# Patient Record
Sex: MALE | Race: WHITE | NOT HISPANIC OR LATINO | Employment: OTHER | URBAN - METROPOLITAN AREA
[De-identification: names, ages, dates, MRNs, and addresses within clinical notes are randomized per-mention and may not be internally consistent; named-entity substitution may affect disease eponyms.]

---

## 2017-06-15 ENCOUNTER — HOSPITAL ENCOUNTER (OUTPATIENT)
Dept: CARDIOLOGY | Facility: HOSPITAL | Age: 67
Discharge: HOME | End: 2017-06-15
Attending: INTERNAL MEDICINE | Admitting: INTERNAL MEDICINE

## 2017-06-19 ENCOUNTER — HOSPITAL ENCOUNTER (OUTPATIENT)
Dept: OTHER | Facility: HOSPITAL | Age: 67
Discharge: HOME | End: 2017-06-19
Attending: INTERNAL MEDICINE | Admitting: INTERNAL MEDICINE

## 2017-06-19 LAB
ANION GAP SERPL CALC-SCNC: 13.5 MMOL/L (ref 10–20)
BASOPHILS # BLD AUTO: 0.1 10*3/UL (ref 0–0.2)
BASOPHILS NFR BLD AUTO: 1 % (ref 0–2)
BUN SERPL-MCNC: 14 MG/DL (ref 8–20)
BUN/CREAT SERPL: 15.6 (ref 6.2–20.3)
CALCIUM SERPL-MCNC: 9.6 MG/DL (ref 8.9–10.3)
CHLORIDE SERPL-SCNC: 103 MMOL/L (ref 101–111)
CHOLEST SERPL-MCNC: 141 MG/DL
CHOLEST/HDLC SERPL: 3.8 {RATIO}
CONV CO2: 28 MMOL/L (ref 22–32)
CONV LDL CHOLESTEROL DIRECT: 89 MG/DL (ref 0–100)
CREAT UR-MCNC: 0.9 MG/DL (ref 0.7–1.2)
DIFFERENTIAL METHOD BLD: (no result)
EOSINOPHIL # BLD AUTO: 0.1 10*3/UL (ref 0–0.3)
EOSINOPHIL # BLD AUTO: 2 % (ref 0–3)
ERYTHROCYTE [DISTWIDTH] IN BLOOD BY AUTOMATED COUNT: 15.1 % (ref 11.5–14.5)
GLUCOSE SERPL-MCNC: 109 MG/DL (ref 65–99)
HCT VFR BLD AUTO: 43 % (ref 40–54)
HDLC SERPL-MCNC: 37 MG/DL
HGB BLD-MCNC: 14 G/DL (ref 14–18)
INR PPP: 0.9
LDLC/HDLC SERPL: 2.4 {RATIO}
LIPID INTERPRETATION: ABNORMAL
LYMPHOCYTES # BLD AUTO: 1.6 10*3/UL (ref 0.8–4.8)
LYMPHOCYTES NFR BLD AUTO: 20 % (ref 18–42)
MCH RBC QN AUTO: 24.6 PG (ref 26–32)
MCHC RBC AUTO-ENTMCNC: 32.7 G/DL (ref 32–36)
MCV RBC AUTO: 75.4 FL (ref 80–94)
MONOCYTES # BLD AUTO: 0.8 10*3/UL (ref 0.1–1.3)
MONOCYTES NFR BLD AUTO: 11 % (ref 2–11)
NEUTROPHILS # BLD AUTO: 5.1 10*3/UL (ref 2.3–8.6)
NEUTROPHILS NFR BLD AUTO: 66 % (ref 50–75)
NRBC BLD AUTO-RTO: 0 /100{WBCS}
NRBC/RBC NFR BLD MANUAL: 0 10*3/UL
PLATELET # BLD AUTO: 213 10*3/UL (ref 150–450)
PMV BLD AUTO: 10 FL (ref 7.4–10.4)
POTASSIUM SERPL-SCNC: 4.5 MMOL/L (ref 3.6–5.1)
PROTHROMBIN TIME: 10.8 SEC (ref 9.6–11.7)
RBC # BLD AUTO: 5.7 10*6/UL (ref 4.6–6)
SODIUM SERPL-SCNC: 140 MMOL/L (ref 136–144)
TRIGL SERPL-MCNC: 67 MG/DL
VLDLC SERPL CALC-MCNC: 15 MG/DL
WBC # BLD AUTO: 7.6 10*3/UL (ref 4.5–11.5)

## 2018-05-18 ENCOUNTER — HOSPITAL ENCOUNTER (OUTPATIENT)
Dept: LAB | Facility: HOSPITAL | Age: 68
Discharge: HOME | End: 2018-05-18
Attending: INTERNAL MEDICINE | Admitting: INTERNAL MEDICINE

## 2018-05-18 LAB
ALBUMIN SERPL-MCNC: 4.1 G/DL (ref 3.5–4.8)
ALBUMIN/GLOB SERPL: 1.8 {RATIO} (ref 1–1.7)
ALP SERPL-CCNC: 56 IU/L (ref 32–91)
ALT SERPL-CCNC: 19 IU/L (ref 17–63)
ANION GAP SERPL CALC-SCNC: 9.8 MMOL/L (ref 10–20)
AST SERPL-CCNC: 22 IU/L (ref 15–41)
BASOPHILS # BLD AUTO: 0.1 10*3/UL (ref 0–0.2)
BASOPHILS NFR BLD AUTO: 1 % (ref 0–2)
BILIRUB SERPL-MCNC: 1 MG/DL (ref 0.3–1.2)
BUN SERPL-MCNC: 17 MG/DL (ref 8–20)
BUN/CREAT SERPL: 17 (ref 6.2–20.3)
CALCIUM SERPL-MCNC: 10.1 MG/DL (ref 8.9–10.3)
CHLORIDE SERPL-SCNC: 103 MMOL/L (ref 101–111)
CHOLEST SERPL-MCNC: 102 MG/DL
CHOLEST/HDLC SERPL: 3.9 {RATIO}
CONV CO2: 29 MMOL/L (ref 22–32)
CONV LDL CHOLESTEROL DIRECT: 66 MG/DL (ref 0–100)
CONV TOTAL PROTEIN: 6.4 G/DL (ref 6.1–7.9)
CREAT UR-MCNC: 1 MG/DL (ref 0.7–1.2)
DIFFERENTIAL METHOD BLD: (no result)
EOSINOPHIL # BLD AUTO: 0.1 10*3/UL (ref 0–0.3)
EOSINOPHIL # BLD AUTO: 1 % (ref 0–3)
ERYTHROCYTE [DISTWIDTH] IN BLOOD BY AUTOMATED COUNT: 15.6 % (ref 11.5–14.5)
GLOBULIN UR ELPH-MCNC: 2.3 G/DL (ref 2.5–3.8)
GLUCOSE SERPL-MCNC: 111 MG/DL (ref 65–99)
HCT VFR BLD AUTO: 47.2 % (ref 40–54)
HDLC SERPL-MCNC: 26 MG/DL
HGB BLD-MCNC: 15.3 G/DL (ref 14–18)
LDLC/HDLC SERPL: 2.5 {RATIO}
LIPID INTERPRETATION: ABNORMAL
LYMPHOCYTES # BLD AUTO: 1.8 10*3/UL (ref 0.8–4.8)
LYMPHOCYTES NFR BLD AUTO: 22 % (ref 18–42)
MAGNESIUM SERPL-MCNC: 2.1 MG/DL (ref 1.8–2.5)
MCH RBC QN AUTO: 26.5 PG (ref 26–32)
MCHC RBC AUTO-ENTMCNC: 32.5 G/DL (ref 32–36)
MCV RBC AUTO: 81.7 FL (ref 80–94)
MONOCYTES # BLD AUTO: 0.9 10*3/UL (ref 0.1–1.3)
MONOCYTES NFR BLD AUTO: 11 % (ref 2–11)
NEUTROPHILS # BLD AUTO: 5.3 10*3/UL (ref 2.3–8.6)
NEUTROPHILS NFR BLD AUTO: 65 % (ref 50–75)
NRBC BLD AUTO-RTO: 0 /100{WBCS}
NRBC/RBC NFR BLD MANUAL: 0 10*3/UL
PLATELET # BLD AUTO: 207 10*3/UL (ref 150–450)
PMV BLD AUTO: 10.7 FL (ref 7.4–10.4)
POTASSIUM SERPL-SCNC: 4.8 MMOL/L (ref 3.6–5.1)
RBC # BLD AUTO: 5.78 10*6/UL (ref 4.6–6)
SODIUM SERPL-SCNC: 137 MMOL/L (ref 136–144)
TRIGL SERPL-MCNC: 71 MG/DL
VLDLC SERPL CALC-MCNC: 9.5 MG/DL
WBC # BLD AUTO: 8.1 10*3/UL (ref 4.5–11.5)

## 2018-05-19 LAB
PSA FREE SERPL-MCNC: 0.25 NG/ML
PSA SERPL-MCNC: 1 NG/ML

## 2018-08-07 ENCOUNTER — ON CAMPUS - OUTPATIENT (OUTPATIENT)
Dept: URBAN - METROPOLITAN AREA HOSPITAL 85 | Facility: HOSPITAL | Age: 68
End: 2018-08-07
Payer: COMMERCIAL

## 2018-08-07 ENCOUNTER — HOSPITAL ENCOUNTER (OUTPATIENT)
Dept: PREOP | Facility: HOSPITAL | Age: 68
Setting detail: HOSPITAL OUTPATIENT SURGERY
Discharge: HOME | End: 2018-08-07
Attending: INTERNAL MEDICINE | Admitting: INTERNAL MEDICINE

## 2018-08-07 DIAGNOSIS — Z86.010 PERSONAL HISTORY OF COLONIC POLYPS: ICD-10-CM

## 2018-08-07 DIAGNOSIS — K64.8 OTHER HEMORRHOIDS: ICD-10-CM

## 2018-08-07 DIAGNOSIS — K57.30 DIVERTICULOSIS OF LARGE INTESTINE WITHOUT PERFORATION OR ABS: ICD-10-CM

## 2018-08-07 DIAGNOSIS — D12.2 BENIGN NEOPLASM OF ASCENDING COLON: ICD-10-CM

## 2018-08-07 DIAGNOSIS — K22.70 BARRETT'S ESOPHAGUS WITHOUT DYSPLASIA: ICD-10-CM

## 2018-08-07 DIAGNOSIS — R13.10 DYSPHAGIA, UNSPECIFIED: ICD-10-CM

## 2018-08-07 DIAGNOSIS — K22.2 ESOPHAGEAL OBSTRUCTION: ICD-10-CM

## 2018-08-07 DIAGNOSIS — D12.5 BENIGN NEOPLASM OF SIGMOID COLON: ICD-10-CM

## 2018-08-07 PROCEDURE — 43239 EGD BIOPSY SINGLE/MULTIPLE: CPT | Performed by: INTERNAL MEDICINE

## 2018-08-07 PROCEDURE — 45385 COLONOSCOPY W/LESION REMOVAL: CPT | Performed by: INTERNAL MEDICINE

## 2018-08-07 PROCEDURE — 43450 DILATE ESOPHAGUS 1/MULT PASS: CPT | Performed by: INTERNAL MEDICINE

## 2019-06-17 ENCOUNTER — HOSPITAL ENCOUNTER (EMERGENCY)
Dept: GENERAL RADIOLOGY | Facility: HOSPITAL | Age: 69
Discharge: HOME | End: 2019-06-17

## 2019-06-17 ENCOUNTER — HOSPITAL ENCOUNTER (OUTPATIENT)
Facility: HOSPITAL | Age: 69
Setting detail: OBSERVATION
Discharge: HOME | End: 2019-06-19
Attending: EMERGENCY MEDICINE | Admitting: INTERNAL MEDICINE

## 2019-06-17 ENCOUNTER — TELEPHONE (OUTPATIENT)
Dept: CARDIOLOGY | Facility: CLINIC | Age: 69
End: 2019-06-17

## 2019-06-17 DIAGNOSIS — R07.2 PRECORDIAL PAIN: Primary | ICD-10-CM

## 2019-06-17 PROBLEM — K21.9 GASTRIC REFLUX: Chronic | Status: ACTIVE | Noted: 2019-06-17

## 2019-06-17 PROBLEM — I10 HYPERTENSION: Chronic | Status: ACTIVE | Noted: 2019-06-17

## 2019-06-17 PROBLEM — K21.9 GASTRIC REFLUX: Status: ACTIVE | Noted: 2019-06-17

## 2019-06-17 PROBLEM — K22.70 BARRETT ESOPHAGUS: Chronic | Status: ACTIVE | Noted: 2018-08-07

## 2019-06-17 PROBLEM — K22.70 BARRETT ESOPHAGUS: Status: ACTIVE | Noted: 2018-08-07

## 2019-06-17 PROBLEM — K82.8 BILIARY DYSKINESIA: Status: ACTIVE | Noted: 2019-06-17

## 2019-06-17 PROBLEM — E78.5 HYPERLIPIDEMIA: Status: ACTIVE | Noted: 2019-06-17

## 2019-06-17 PROBLEM — K64.9 HEMORRHOIDS: Status: ACTIVE | Noted: 2019-06-17

## 2019-06-17 PROBLEM — I25.10 CAD (CORONARY ARTERY DISEASE): Chronic | Status: ACTIVE | Noted: 2019-06-17

## 2019-06-17 PROBLEM — I25.10 CAD (CORONARY ARTERY DISEASE): Status: ACTIVE | Noted: 2019-06-17

## 2019-06-17 PROBLEM — M19.90 ARTHRITIS: Chronic | Status: ACTIVE | Noted: 2019-06-17

## 2019-06-17 PROBLEM — M19.90 ARTHRITIS: Status: ACTIVE | Noted: 2019-06-17

## 2019-06-17 PROBLEM — K64.9 HEMORRHOIDS: Chronic | Status: ACTIVE | Noted: 2019-06-17

## 2019-06-17 PROBLEM — K57.90 DIVERTICULOSIS: Chronic | Status: ACTIVE | Noted: 2018-08-07

## 2019-06-17 PROBLEM — K57.90 DIVERTICULOSIS: Status: ACTIVE | Noted: 2018-08-07

## 2019-06-17 PROBLEM — E78.5 HYPERLIPIDEMIA: Chronic | Status: ACTIVE | Noted: 2019-06-17

## 2019-06-17 PROBLEM — K82.8 BILIARY DYSKINESIA: Status: RESOLVED | Noted: 2019-06-17 | Resolved: 2019-06-17

## 2019-06-17 PROBLEM — I10 HYPERTENSION: Status: ACTIVE | Noted: 2019-06-17

## 2019-06-17 PROBLEM — K82.8 BILIARY DYSKINESIA: Chronic | Status: ACTIVE | Noted: 2019-06-17

## 2019-06-17 LAB
ALBUMIN SERPL-MCNC: 3.9 G/DL (ref 3.5–4.8)
ALBUMIN/GLOB SERPL: 1.9 G/DL (ref 1–1.7)
ALP SERPL-CCNC: 51 U/L (ref 32–91)
ALT SERPL W P-5'-P-CCNC: 15 U/L (ref 17–63)
ANION GAP SERPL CALCULATED.3IONS-SCNC: 13 MMOL/L (ref 10–20)
AST SERPL-CCNC: 18 U/L (ref 15–41)
BASOPHILS # BLD AUTO: 0 10*3/MM3 (ref 0–0.2)
BASOPHILS NFR BLD AUTO: 0.6 % (ref 0–1.5)
BILIRUB SERPL-MCNC: 1.5 MG/DL (ref 0.3–1.2)
BNP SERPL-MCNC: 30 PG/ML
BUN SERPL-MCNC: 11 MG/DL (ref 8–20)
BUN/CREAT SERPL: 15.7 (ref 6.2–20.3)
CALCIUM SPEC-SCNC: 9.4 MG/DL (ref 8.9–10.3)
CHLORIDE SERPL-SCNC: 105 MMOL/L (ref 101–111)
CO2 SERPL-SCNC: 22 MMOL/L (ref 22–32)
CREAT SERPL-MCNC: 0.7 MG/DL (ref 0.7–1.2)
DEPRECATED RDW RBC AUTO: 41.1 FL (ref 37–54)
EOSINOPHIL # BLD AUTO: 0 10*3/MM3 (ref 0–0.4)
EOSINOPHIL NFR BLD AUTO: 0.7 % (ref 0.3–6.2)
ERYTHROCYTE [DISTWIDTH] IN BLOOD BY AUTOMATED COUNT: 16.5 % (ref 12.3–15.4)
GFR SERPL CREATININE-BSD FRML MDRD: 111 ML/MIN/1.73
GLOBULIN UR ELPH-MCNC: 2.1 GM/DL (ref 2.5–3.8)
GLUCOSE SERPL-MCNC: 105 MG/DL (ref 65–99)
HCT VFR BLD AUTO: 38.5 % (ref 37.5–51)
HGB BLD-MCNC: 12 G/DL (ref 13–17.7)
HOLD SPECIMEN: NORMAL
HOLD SPECIMEN: NORMAL
INR PPP: 1.04 (ref 0.9–1.1)
LYMPHOCYTES # BLD AUTO: 1.6 10*3/MM3 (ref 0.7–3.1)
LYMPHOCYTES NFR BLD AUTO: 25.6 % (ref 19.6–45.3)
MCH RBC QN AUTO: 22.2 PG (ref 26.6–33)
MCHC RBC AUTO-ENTMCNC: 31.3 G/DL (ref 31.5–35.7)
MCV RBC AUTO: 71 FL (ref 79–97)
MONOCYTES # BLD AUTO: 0.6 10*3/MM3 (ref 0.1–0.9)
MONOCYTES NFR BLD AUTO: 9.3 % (ref 5–12)
NEUTROPHILS NFR BLD AUTO: 4.1 10*3/MM3 (ref 1.7–7)
NEUTROPHILS NFR BLD AUTO: 63.8 % (ref 42.7–76)
NRBC BLD AUTO-RTO: 0 /100 WBC (ref 0–0.2)
PLATELET # BLD AUTO: 199 10*3/MM3 (ref 140–450)
PMV BLD AUTO: 10.4 FL (ref 6–12)
POTASSIUM SERPL-SCNC: 4.1 MMOL/L (ref 3.6–5.1)
PROT SERPL-MCNC: 6 G/DL (ref 6.1–7.9)
PROTHROMBIN TIME: 10.7 SECONDS (ref 9.6–11.7)
RBC # BLD AUTO: 5.42 10*6/MM3 (ref 4.14–5.8)
SODIUM SERPL-SCNC: 140 MMOL/L (ref 136–144)
TROPONIN I SERPL-MCNC: <0.03 NG/ML (ref 0–0.03)
WBC NRBC COR # BLD: 6.4 10*3/MM3 (ref 3.4–10.8)
WHOLE BLOOD HOLD SPECIMEN: NORMAL
WHOLE BLOOD HOLD SPECIMEN: NORMAL

## 2019-06-17 PROCEDURE — 71045 X-RAY EXAM CHEST 1 VIEW: CPT

## 2019-06-17 PROCEDURE — 99284 EMERGENCY DEPT VISIT MOD MDM: CPT

## 2019-06-17 PROCEDURE — 85025 COMPLETE CBC W/AUTO DIFF WBC: CPT | Performed by: EMERGENCY MEDICINE

## 2019-06-17 PROCEDURE — 80053 COMPREHEN METABOLIC PANEL: CPT | Performed by: EMERGENCY MEDICINE

## 2019-06-17 PROCEDURE — 25010000002 ENOXAPARIN PER 10 MG: Performed by: NURSE PRACTITIONER

## 2019-06-17 PROCEDURE — 93005 ELECTROCARDIOGRAM TRACING: CPT | Performed by: NURSE PRACTITIONER

## 2019-06-17 PROCEDURE — G0378 HOSPITAL OBSERVATION PER HR: HCPCS

## 2019-06-17 PROCEDURE — 99218 PR INITIAL OBSERVATION CARE/DAY 30 MINUTES: CPT | Performed by: NURSE PRACTITIONER

## 2019-06-17 PROCEDURE — 96372 THER/PROPH/DIAG INJ SC/IM: CPT

## 2019-06-17 PROCEDURE — 83880 ASSAY OF NATRIURETIC PEPTIDE: CPT | Performed by: EMERGENCY MEDICINE

## 2019-06-17 PROCEDURE — 84484 ASSAY OF TROPONIN QUANT: CPT | Performed by: EMERGENCY MEDICINE

## 2019-06-17 PROCEDURE — 85610 PROTHROMBIN TIME: CPT | Performed by: EMERGENCY MEDICINE

## 2019-06-17 PROCEDURE — 99213 OFFICE O/P EST LOW 20 MIN: CPT | Performed by: INTERNAL MEDICINE

## 2019-06-17 PROCEDURE — 84484 ASSAY OF TROPONIN QUANT: CPT | Performed by: NURSE PRACTITIONER

## 2019-06-17 RX ORDER — ONDANSETRON 4 MG/1
4 TABLET, FILM COATED ORAL EVERY 6 HOURS PRN
Status: DISCONTINUED | OUTPATIENT
Start: 2019-06-17 | End: 2019-06-19 | Stop reason: HOSPADM

## 2019-06-17 RX ORDER — ASPIRIN 325 MG
325 TABLET ORAL ONCE
Status: COMPLETED | OUTPATIENT
Start: 2019-06-17 | End: 2019-06-17

## 2019-06-17 RX ORDER — ACETAMINOPHEN 325 MG/1
650 TABLET ORAL EVERY 4 HOURS PRN
Status: DISCONTINUED | OUTPATIENT
Start: 2019-06-17 | End: 2019-06-19 | Stop reason: HOSPADM

## 2019-06-17 RX ORDER — SODIUM CHLORIDE 0.9 % (FLUSH) 0.9 %
3-10 SYRINGE (ML) INJECTION AS NEEDED
Status: DISCONTINUED | OUTPATIENT
Start: 2019-06-17 | End: 2019-06-19 | Stop reason: HOSPADM

## 2019-06-17 RX ORDER — ISOSORBIDE MONONITRATE 60 MG/1
60 TABLET, EXTENDED RELEASE ORAL DAILY
COMMUNITY
End: 2019-07-12 | Stop reason: SDUPTHER

## 2019-06-17 RX ORDER — SODIUM CHLORIDE 0.9 % (FLUSH) 0.9 %
3 SYRINGE (ML) INJECTION EVERY 12 HOURS SCHEDULED
Status: DISCONTINUED | OUTPATIENT
Start: 2019-06-17 | End: 2019-06-19 | Stop reason: HOSPADM

## 2019-06-17 RX ORDER — OMEPRAZOLE 40 MG/1
40 CAPSULE, DELAYED RELEASE ORAL DAILY
COMMUNITY
End: 2019-12-16 | Stop reason: SDUPTHER

## 2019-06-17 RX ORDER — ASPIRIN 325 MG
TABLET ORAL
COMMUNITY
Start: 2015-07-02 | End: 2019-06-17

## 2019-06-17 RX ORDER — CHOLECALCIFEROL (VITAMIN D3) 125 MCG
5 CAPSULE ORAL NIGHTLY PRN
Status: DISCONTINUED | OUTPATIENT
Start: 2019-06-17 | End: 2019-06-19 | Stop reason: HOSPADM

## 2019-06-17 RX ORDER — SIMVASTATIN 20 MG
20 TABLET ORAL DAILY
COMMUNITY
End: 2019-12-16 | Stop reason: SDUPTHER

## 2019-06-17 RX ORDER — NITROGLYCERIN 0.4 MG/1
TABLET SUBLINGUAL
COMMUNITY
Start: 2018-05-14 | End: 2020-05-14 | Stop reason: SDUPTHER

## 2019-06-17 RX ORDER — SIMVASTATIN 20 MG
TABLET ORAL
COMMUNITY
Start: 2013-11-05 | End: 2019-06-17

## 2019-06-17 RX ORDER — PANTOPRAZOLE SODIUM 40 MG/1
40 TABLET, DELAYED RELEASE ORAL EVERY MORNING
Status: DISCONTINUED | OUTPATIENT
Start: 2019-06-18 | End: 2019-06-19 | Stop reason: HOSPADM

## 2019-06-17 RX ORDER — UBIDECARENONE 100 MG
100 CAPSULE ORAL DAILY
COMMUNITY
End: 2022-09-12

## 2019-06-17 RX ORDER — SODIUM CHLORIDE 0.9 % (FLUSH) 0.9 %
10 SYRINGE (ML) INJECTION AS NEEDED
Status: DISCONTINUED | OUTPATIENT
Start: 2019-06-17 | End: 2019-06-19 | Stop reason: HOSPADM

## 2019-06-17 RX ORDER — ISOSORBIDE MONONITRATE 30 MG/1
60 TABLET, EXTENDED RELEASE ORAL DAILY
COMMUNITY
Start: 2018-05-14 | End: 2019-06-17

## 2019-06-17 RX ORDER — NITROGLYCERIN 0.4 MG/1
0.4 TABLET SUBLINGUAL
COMMUNITY

## 2019-06-17 RX ORDER — ONDANSETRON 2 MG/ML
4 INJECTION INTRAMUSCULAR; INTRAVENOUS EVERY 6 HOURS PRN
Status: DISCONTINUED | OUTPATIENT
Start: 2019-06-17 | End: 2019-06-19 | Stop reason: HOSPADM

## 2019-06-17 RX ORDER — ASPIRIN 325 MG
325 TABLET ORAL DAILY
Status: DISCONTINUED | OUTPATIENT
Start: 2019-06-18 | End: 2019-06-19 | Stop reason: HOSPADM

## 2019-06-17 RX ORDER — NITROGLYCERIN 0.4 MG/1
0.4 TABLET SUBLINGUAL
Status: DISCONTINUED | OUTPATIENT
Start: 2019-06-17 | End: 2019-06-19 | Stop reason: HOSPADM

## 2019-06-17 RX ORDER — CLOPIDOGREL BISULFATE 75 MG/1
75 TABLET ORAL DAILY
COMMUNITY
Start: 2018-06-05 | End: 2019-12-16 | Stop reason: SDUPTHER

## 2019-06-17 RX ORDER — ALUMINA, MAGNESIA, AND SIMETHICONE 2400; 2400; 240 MG/30ML; MG/30ML; MG/30ML
15 SUSPENSION ORAL EVERY 6 HOURS PRN
Status: DISCONTINUED | OUTPATIENT
Start: 2019-06-17 | End: 2019-06-19 | Stop reason: HOSPADM

## 2019-06-17 RX ORDER — CLOPIDOGREL BISULFATE 75 MG/1
75 TABLET ORAL DAILY
Status: DISCONTINUED | OUTPATIENT
Start: 2019-06-18 | End: 2019-06-19 | Stop reason: HOSPADM

## 2019-06-17 RX ORDER — NITROGLYCERIN 10 MG/100ML
10-50 INJECTION INTRAVENOUS
Status: DISCONTINUED | OUTPATIENT
Start: 2019-06-17 | End: 2019-06-19 | Stop reason: HOSPADM

## 2019-06-17 RX ORDER — ATORVASTATIN CALCIUM 10 MG/1
10 TABLET, FILM COATED ORAL DAILY
Status: DISCONTINUED | OUTPATIENT
Start: 2019-06-18 | End: 2019-06-19 | Stop reason: HOSPADM

## 2019-06-17 RX ORDER — ASPIRIN 325 MG
325 TABLET ORAL
COMMUNITY

## 2019-06-17 RX ADMIN — ENOXAPARIN SODIUM 40 MG: 40 INJECTION SUBCUTANEOUS at 21:32

## 2019-06-17 RX ADMIN — Medication 10 ML: at 21:35

## 2019-06-17 RX ADMIN — ASPIRIN 325 MG ORAL TABLET 325 MG: 325 PILL ORAL at 12:36

## 2019-06-17 RX ADMIN — MAGNESIUM OXIDE TAB 400 MG (241.3 MG ELEMENTAL MG) 400 MG: 400 (241.3 MG) TAB at 21:33

## 2019-06-17 RX ADMIN — NITROGLYCERIN 5 MCG/MIN: 10 INJECTION INTRAVENOUS at 12:33

## 2019-06-17 NOTE — ED NOTES
Nitro drip still infusing at 5mcg/min. /64 and HR 58. Pt stable and tolerating well.      Prior, GABRIEL Sweeney  06/17/19 9513

## 2019-06-17 NOTE — ED PROVIDER NOTES
Subjective   68-year-old male complaining of chest pain starting this morning.  He states initially was a dull ache and it became heavy and oppressive and was more severe.  He states that he has had no recent fever chills or cough.  He reports no change in exercise tolerance.  Reports that he was briefly nauseated.  He denies diaphoresis or palpitations and he reports no recent leg swelling            Review of Systems   Constitutional: Negative for unexpected weight change.   Respiratory: Positive for chest tightness and shortness of breath.    Cardiovascular: Positive for chest pain. Negative for leg swelling.   Gastrointestinal: Positive for nausea.   Neurological: Negative for weakness.   All other systems reviewed and are negative.      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   • Biliary dyskinesia 6/17/2019   • CAD (coronary artery disease) 6/17/2019   • Coronary artery disease    • Diverticulosis 8/7/2018   • Gastric reflux 6/17/2019   • Hemorrhoids 6/17/2019   • Hyperlipidemia 6/17/2019   • Hypertension 6/17/2019       Allergies   Allergen Reactions   • Morphine Anaphylaxis       Past Surgical History:   Procedure Laterality Date   • HERNIA REPAIR         Family History   Problem Relation Age of Onset   • Heart failure Mother    • Heart failure Father        Social History     Socioeconomic History   • Marital status:      Spouse name: Not on file   • Number of children: Not on file   • Years of education: Not on file   • Highest education level: Not on file   Tobacco Use   • Smoking status: Former Smoker   Substance and Sexual Activity   • Alcohol use: No     Frequency: Never   • Drug use: No   • Sexual activity: Defer           Objective   Physical Exam  Alert Vernal Coma Scale 15   HEENT: Pupils equal and reactive to light. Conjunctivae are not injected. normal tympanic membranes. Oropharynx and nares are normal.   Neck: Supple. Midline  trachea. No JVD. No goiter.   Chest: Clear and equal breath sounds bilaterally regular rate and rhythm without murmur or rub.   Abdomen: Positive bowel sounds nontender nondistended. No rebound or peritoneal signs. No CVA tenderness.   Extremities no clubbing cyanosis or edema motor sensory exam is normal the full range of motion is intact   skin: Warm and dry, no rashes or petechia.   Lymphatic: No regional lymphadenopathy. No calf pain, swelling or Ofelia's sign  Procedures           ED Course  ED Course as of Jun 17 1533 Mon Jun 17, 2019   1511 In slowly resolved with IV nitroglycerin.  Patient will be admitted to the hospital for serial troponin and EKG.  She was agreeable to this plan of treatment.  The case will be discussed with the hospitalist  [TH]      ED Course User Index  [TH] Lobito Arnett MD      EKG shows sinus rhythm, borderline sinus bradycardia.  Without ST segment elevation or depression          MDM  Number of Diagnoses or Management Options     Amount and/or Complexity of Data Reviewed  Tests in the radiology section of CPT®: reviewed  Tests in the medicine section of CPT®: reviewed  Decide to obtain previous medical records or to obtain history from someone other than the patient: yes  Obtain history from someone other than the patient: yes  Review and summarize past medical records: yes  Discuss the patient with other providers: yes  Independent visualization of images, tracings, or specimens: yes    Risk of Complications, Morbidity, and/or Mortality  General comments: The patient will have cardiology consultation with Dr. Hubbard per request          Final diagnoses:   Chest pain            Lobito Arnett MD  06/17/19 4822

## 2019-06-17 NOTE — ED NOTES
Pt is resting comfortably in bed. Nitro drip still infusing at 5mcg/min with BP of 133/71 HR 59. Pt tolerating well. Pt rating pain 2/10 showing improvement from last pain rating.     Prior, GABRIEL Sweeney  06/17/19 5848

## 2019-06-17 NOTE — TELEPHONE ENCOUNTER
Pt's wife called saying that her  has low bp 106/60 and is experiencing chest pain. Has taken nitro and it has not helped. I advised ER and let them know I would put in a note to Dr. Hickey.

## 2019-06-17 NOTE — ED NOTES
Pt st he started having cp this am with lethargy. Pt st he has no energy and the pain is manageable, but feels like a dull ache     Rae Stoddard RN  06/17/19 9991

## 2019-06-17 NOTE — H&P
[facility] HOSPITALIST     PCP:  Deidra Olea APRN   Cardiology:  Dr. Hickey      CHIEF COMPLAINT:     Chest pain       HISTORY OF PRESENT ILLNESS:    This is a 68-year-old  male with a history of CAD, PCI, HTN, HLD, GERD, and Solis's esophagitis who presented to the ED on 06/17/2019 with complaint of chest pain. The patient states he had some dull heaviness in the center of his chest this morning. He states it became more severe, but did not radiate. He also reports some epigastric discomfort. He denies shortness of breath, diaphoresis, dizziness, palpitations, nausea, or vomiting. He states he took 1 sublingual nitroglycerin at home prior to arrival, but did not get any relief. He denies any exacerbating or alleviating factors. He is unsure when his last stress test was done, but states he saw his cardiologist in March 2019.     Review of records:  -6/15/17 stress Cardiolite revealed reproducible chest discomfort 1.5 mm of inferior and anterolateral ST depression and inferior and apical moderate to significant ischemia.   -6/21/17 LHC revealed normal left ventricular function diagonal branch stent was patent.  No significant disease was present. Please note RCA was engaged using 3D RC catheter.      Upon arrival to the ER the patient was started on Tridil drip and given aspirin 325 mg PO. His troponin was negative. His EKG showed no acute ischemia. His CXR and labs were unremarkable. He will be admitted for observation and further evaluation.         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   • Biliary dyskinesia 6/17/2019   • Coronary artery disease    • Diverticulosis 8/7/2018   • Gastric reflux 6/17/2019   • Hemorrhoids 6/17/2019   • Hyperlipidemia 6/17/2019   • Hypertension 6/17/2019     Past Surgical History:   Procedure Laterality Date   • CORONARY ANGIOPLASTY WITH STENT PLACEMENT  06/21/2010   • HERNIA REPAIR       Family  History   Problem Relation Age of Onset   • Heart failure Mother    • Heart failure Father      Social History     Tobacco Use   • Smoking status: Former Smoker   • Smokeless tobacco: Never Used   Substance Use Topics   • Alcohol use: No     Frequency: Never   • Drug use: No         Prior to Admission medications    Medication Sig Start Date End Date Taking? Authorizing Provider   aspirin 325 MG tablet Take 325 mg by mouth every night at bedtime.   Yes Hyacinth Arteaga MD   calcium carbonate-vitamin d (CALCIUM 600+D) 600-400 MG-UNIT per tablet Take  by mouth 2 (Two) Times a Day.   Yes Hyacinth Arteaga MD   clopidogrel (PLAVIX) 75 MG tablet Take 75 mg by mouth Daily. 6/5/18  Yes Hyacinth Arteaga MD   coenzyme Q10 100 MG capsule Take 100 mg by mouth Daily.   Yes Hyacinth Arteaga MD   isosorbide mononitrate (IMDUR) 60 MG 24 hr tablet Take 60 mg by mouth Daily.   Yes Hyacinth Arteaga MD   magnesium oxide (MAGOX) 400 (241.3 Mg) MG tablet tablet Take 400 mg by mouth every night at bedtime.   Yes Hyacinth Arteaga MD   nitroglycerin (NITROSTAT) 0.4 MG SL tablet NITROSTAT 0.4 MG SUBL 5/14/18  Yes Hyacinth Arteaga MD   nitroglycerin (NITROSTAT) 0.4 MG SL tablet Place 0.4 mg under the tongue Every 5 (Five) Minutes As Needed for Chest Pain. Take no more than 3 doses in 15 minutes.   Yes Hyacinth Arteaga MD   omeprazole (priLOSEC) 40 MG capsule Take 40 mg by mouth Daily.   Yes Hyacinth Arteaga MD   Potassium 99 MG tablet Take 1 tablet by mouth Daily.   Yes Hyacinth Arteaga MD   simvastatin (ZOCOR) 20 MG tablet Take 20 mg by mouth Daily.   Yes Hyacinth Arteaga MD   aspirin 325 MG tablet ASPIRIN 325 MG TABS 7/2/15 6/17/19 Yes Hyacinth Arteaga MD   Calcium Carb-Cholecalciferol (CALCIUM 1000 + D) 1000-800 MG-UNIT tablet CALCIUM + D 600-200 MG-UNIT ORAL TABS (CALCIUM CARB-CHOLECALCIFEROL) 6/15/17 6/17/19 Yes Hyacinth Arteaga MD   isosorbide mononitrate (IMDUR) 30 MG 24  "hr tablet Take 60 mg by mouth Daily. 5/14/18 6/17/19 Yes Provider, MD Hyacinth   Magnesium Oxide -Mg Supplement 400 MG capsule MAGNESIUM 400 MG CAPS 6/15/17 6/17/19 Yes Provider, MD Hyacinth   Potassium 99 MG tablet POTASSIUM 99 MG TABS 6/15/17 6/17/19 Yes Provider, MD Hyacinth   simvastatin (ZOCOR) 20 MG tablet SIMVASTATIN 20 MG TABS 11/5/13 6/17/19 Yes Provider, MD Hyacinth       Allergies:  Morphine      There is no immunization history on file for this patient.        REVIEW OF SYSTEMS:  Please see the above history of present illness for pertinent positives and negatives.  The remainder of the patient's systems have been reviewed and are negative.       Vital Signs  Temp:  [97.8 °F (36.6 °C)] 97.8 °F (36.6 °C)  Heart Rate:  [57-64] 59  Resp:  [18] 18  BP: ()/(27-89) 118/64    Flowsheet Rows      First Filed Value   Admission Height  172.7 cm (68\") Documented at 06/17/2019 1057   Admission Weight  86 kg (189 lb 9.5 oz) Documented at 06/17/2019 1057           Physical Exam:  Physical Exam   Constitutional: Patient appears well-developed and well-nourished and in no acute distress     HEENT:   Head: Normocephalic and atraumatic.   Eyes:  Pupils are equal, round, and reactive to light. EOM are intact. Sclera are anicteric and non-injected.  Mouth and Throat: Patient has moist mucous membranes. Oropharynx is clear of any erythema or exudate.       Neck: Neck supple.  No thyromegaly present. No lymphadenopathy present. No  masses.     Cardiovascular: Inspection: No JVD present. Palpation: No parasternal heave. Pedal pulses +3 bilaterally. No leg edema. Auscultation: Regular rate, regular rhythm, S1 normal and S2 normal. reveals no gallop and no friction rub. No Carotid bruit bilaterally.    Pulmonary/Chest: Inspection: No distress, no use of accessory muscles. Lungs are clear to auscultation bilaterally. No respiratory distress. No wheezes. No rhonchi. No rales.     Abdomen /Gastrointestinal: " Inspection: no distension. Palpation: no masses, no organomegaly. Soft. There is no tenderness. Bowel sounds are normal.     Musculoskeletal: Normal Muscle tone. Age appropriate, no deformities.    Neurological: Patient is alert and oriented to person, place, and time. Cranial nerves II-XII are grossly intact with no focal deficits. Sensori-motor exam is normal. No cerebellar signs.    Skin: Skin is warm. No rash noted. Nails show no clubbing.  No cyanosis or erythema. No bruising.      Results Review:    I reviewed the patient's new clinical results.  Lab Results (most recent)     Procedure Component Value Units Date/Time    CBC & Differential [[id number]] Collected:  06/17/19 1203    Specimen:  Blood Updated:  06/17/19 1410    Narrative:       The following orders were created for panel order CBC & Differential.  Procedure                               Abnormality         Status                     ---------                               -----------         ------                     Scan Slide[[id number]]                                                                  CBC Auto Differential[[id number]]        Abnormal            Final result                 Please view results for these tests on the individual orders.    CBC Auto Differential [[id number]]  (Abnormal) Collected:  06/17/19 1203    Specimen:  Blood Updated:  06/17/19 1410     WBC 6.40 10*3/mm3      RBC 5.42 10*6/mm3      Hemoglobin 12.0 g/dL      Hematocrit 38.5 %      MCV 71.0 fL      MCH 22.2 pg      MCHC 31.3 g/dL      RDW 16.5 %      RDW-SD 41.1 fl      MPV 10.4 fL      Platelets 199 10*3/mm3      Neutrophil % 63.8 %      Lymphocyte % 25.6 %      Monocyte % 9.3 %      Eosinophil % 0.7 %      Basophil % 0.6 %      Neutrophils, Absolute 4.10 10*3/mm3      Lymphocytes, Absolute 1.60 10*3/mm3      Monocytes, Absolute 0.60 10*3/mm3      Eosinophils, Absolute 0.00 10*3/mm3      Basophils, Absolute 0.00 10*3/mm3      nRBC 0.0 /100 WBC     Protime-INR  [[id number]]  (Normal) Collected:  06/17/19 1203    Specimen:  Blood Updated:  06/17/19 1340     Protime 10.7 Seconds      INR 1.04    BNP [[id number]]  (Normal) Collected:  06/17/19 1203    Specimen:  Blood Updated:  06/17/19 1322     BNP 30.0 pg/mL      Comment: Results may be falsely decreased if patient taking Biotin.       [place] Draw [[id number]] Collected:  06/17/19 1203    Specimen:  Blood Updated:  06/17/19 1315    Narrative:       The following orders were created for panel order [place] Draw.  Procedure                               Abnormality         Status                     ---------                               -----------         ------                     Light Blue Top[[id number]]                                   Final result               Green Top (Gel)[[id number]]                                  Final result               Lavender Top[[id number]]                                     Final result               Gold Top - SST[[id number]]                                   Final result                 Please view results for these tests on the individual orders.    Gold Top - SST [[id number]] Collected:  06/17/19 1203    Specimen:  Blood Updated:  06/17/19 1315     Extra Tube Hold for add-ons.     Comment: Auto resulted.       Light Blue Top [[id number]] Collected:  06/17/19 1203    Specimen:  Blood Updated:  06/17/19 1315     Extra Tube hold for add-on     Comment: Auto resulted       Green Top (Gel) [[id number]] Collected:  06/17/19 1203    Specimen:  Blood Updated:  06/17/19 1315     Extra Tube Hold for add-ons.     Comment: Auto resulted.       Lavender Top [[id number]] Collected:  06/17/19 1203    Specimen:  Blood Updated:  06/17/19 1315     Extra Tube hold for add-on     Comment: Auto resulted       Comprehensive Metabolic Panel [[id number]]  (Abnormal) Collected:  06/17/19 1203    Specimen:  Blood Updated:  06/17/19 1240     Glucose 105 mg/dL      BUN 11 mg/dL      Creatinine 0.70 mg/dL      Sodium  140 mmol/L      Potassium 4.1 mmol/L      Chloride 105 mmol/L      CO2 22.0 mmol/L      Calcium 9.4 mg/dL      Total Protein 6.0 g/dL      Albumin 3.90 g/dL      ALT (SGPT) 15 U/L      AST (SGOT) 18 U/L      Alkaline Phosphatase 51 U/L      Total Bilirubin 1.5 mg/dL      eGFR Non African Amer 111 mL/min/1.73      Globulin 2.1 gm/dL      A/G Ratio 1.9 g/dL      BUN/Creatinine Ratio 15.7     Anion Gap 13.0 mmol/L     Troponin [[id number]]  (Normal) Collected:  06/17/19 1203    Specimen:  Blood Updated:  06/17/19 1234     Troponin I <0.030 ng/mL     Narrative:       Troponin I Reference Range:    0.00-0.03  Negative.  Repeat testing in 4-6 hours if clinically indicated.    0.04-0.29  Suspicious for myocardial injury. Serial measurements and clinical  correlation may be necessary to confirm or exclude diagnosis of acute  coronary syndrome.  Repeat testing in 4-6 hours if indicated.     >0.29 Consistent with myocardial injury.  Recommend clinical and laboratory correlation.     Results my be falsely decreased if patient taking Biotin.           Imaging Results (most recent)     Procedure Component Value Units Date/Time    XR Chest 1 View [[id number]] Collected:  06/17/19 1305     Updated:  06/17/19 1305    Narrative:       DATE OF EXAM:  6/17/2019 12:53 PM     PROCEDURE:  XR CHEST 1 VW-     INDICATIONS:  Chest pain and shortness of breath     COMPARISON:  No Comparisons Available     TECHNIQUE:   Single radiographic view of the chest was obtained.     FINDINGS:  The heart size and pulmonary vascular markings are normal. The lungs are  clear. The osseous structures are normal.        Impression:       No active disease     Electronically Signed By-Pelon Beal On:6/17/2019 1:05 PM  This report was finalized on [id number] by  Pelon Beal, .            ECG/EMG Results (most recent)     None                Assessment/Plan       Precordial pain    CAD (coronary artery disease)    Hypertension    Hyperlipidemia    Gastric  reflux    Solis esophagus      Acute chest pain  - r/o ACS   - serial troponin  - stress Myoview in am  - continuous cardiac monitoring  - continue Tridil gtt; wean off as tolerated  - GI cocktail x 1    CAD, h/o stent to diagonal branch (06/21/2010)  -6/15/17 stress Cardiolite revealed reproducible chest discomfort 1.5 mm of inferior and anterolateral ST depression and inferior and apical moderate to significant ischemia.   -6/21/17 LHC revealed normal left ventricular function diagonal branch stent was patent.  No significant disease was present. Please note RCA was engaged using 3D RC catheter.  - continue aspirin, Plavix, and statin  - hold Imdur while on Tridil gtt     Essential HTN, chronic, controlled  - patient states he was taken off antihypertensives after he lost weight  - monitor BP    HLD  - continue statin    GERD / History of Solis's esophagitis  - continue PPI     Former smoker    VTE prophylaxis - Lovenox       Electronically signed by MICHELLE Richey, 06/17/19, 4:31 PM.

## 2019-06-17 NOTE — ED NOTES
Pt is resting in bed comfortably with family at bedside. Pt aware he is being admitted and that we are waiting for a room at this time. Call light is within reach and there are no new orders at this time.      Prior, GABRIEL Sweeney  06/17/19 2007

## 2019-06-17 NOTE — CONSULTS
Referring Provider: Remigio Cohen DO  Reason for Consultation:  Chest pain  Status post stent placement      Patient Care Team:  Deidra Olea APRN as PCP - General  Virgie Rios DPM as PCP - Claims Attributed  Talia Hickey MD as Consulting Physician (Cardiology)  Joann Aparicio APRN as Nurse Practitioner (Family Medicine)    Chief complaint-chest pain  Subjective .     History of present illness:  Evaristo Barlow is a 68 y.o. male who presents with   The patient is a pleasant 68-year-old white male admitted with history of recurrent episodes of chest discomfort described as heaviness and tightness sensation substernal without any radiation of the discomfort into the neck or into the arms.  It is not associated with any sweating shortness of breath palpitations dizziness or syncope.  No other associated aggravating or alleviating factors.  The symptoms are intermittent and significant and brought him to the hospital.  Patient was seen in the hospital in the emergency room.  EKG showed no acute changes.  Troponin levels are negative.  Please see the assessment for summary.  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   • Biliary dyskinesia 6/17/2019   • Coronary artery disease    • Diverticulosis 8/7/2018   • Gastric reflux 6/17/2019   • Hemorrhoids 6/17/2019   • Hyperlipidemia 6/17/2019   • Hypertension 6/17/2019       Past Surgical History:   Procedure Laterality Date   • CORONARY ANGIOPLASTY WITH STENT PLACEMENT  06/21/2010   • HERNIA REPAIR         Family History   Problem Relation Age of Onset   • Heart failure Mother    • Heart failure Father        Social History     Tobacco Use   • Smoking status: Former Smoker   • Smokeless tobacco: Never Used   Substance Use Topics   • Alcohol use: No     Frequency: Never   • Drug use: No        Medications Prior to Admission   Medication Sig Dispense Refill Last Dose   • aspirin 325  MG tablet Take 325 mg by mouth every night at bedtime.   6/16/2019 at 20:00   • calcium carbonate-vitamin d (CALCIUM 600+D) 600-400 MG-UNIT per tablet Take  by mouth 2 (Two) Times a Day.   6/17/2019 at 08:00   • clopidogrel (PLAVIX) 75 MG tablet Take 75 mg by mouth Daily.   6/17/2019 at 08:00   • coenzyme Q10 100 MG capsule Take 100 mg by mouth Daily.   6/17/2019 at 08:00   • isosorbide mononitrate (IMDUR) 60 MG 24 hr tablet Take 60 mg by mouth Daily.   6/17/2019 at 08:00   • magnesium oxide (MAGOX) 400 (241.3 Mg) MG tablet tablet Take 400 mg by mouth every night at bedtime.   6/16/2019 at 20:00   • nitroglycerin (NITROSTAT) 0.4 MG SL tablet NITROSTAT 0.4 MG SUBL      • nitroglycerin (NITROSTAT) 0.4 MG SL tablet Place 0.4 mg under the tongue Every 5 (Five) Minutes As Needed for Chest Pain. Take no more than 3 doses in 15 minutes.      • omeprazole (priLOSEC) 40 MG capsule Take 40 mg by mouth Daily.      • Potassium 99 MG tablet Take 1 tablet by mouth Daily.   6/17/2019 at 08:00   • simvastatin (ZOCOR) 20 MG tablet Take 20 mg by mouth Daily.   6/17/2019 at 08:00         Morphine    Scheduled Meds:  GI cocktail  Oral Once   [START ON 6/18/2019] aspirin 325 mg Oral Daily   [START ON 6/18/2019] atorvastatin 10 mg Oral Daily   [START ON 6/18/2019] clopidogrel 75 mg Oral Daily   enoxaparin 40 mg Subcutaneous Q24H   magnesium oxide 400 mg Oral Nightly   [START ON 6/18/2019] pantoprazole 40 mg Oral QAM   sodium chloride 3 mL Intravenous Q12H     Continuous Infusions:  nitroglycerin 10-50 mcg/min Last Rate: 5 mcg/min (06/17/19 1233)     PRN Meds:.•  acetaminophen  •  aluminum-magnesium hydroxide-simethicone  •  melatonin  •  nitroglycerin  •  ondansetron **OR** ondansetron  •  [COMPLETED] Insert peripheral IV **AND** sodium chloride  •  sodium chloride    Objective     VITAL SIGNS  Vitals:    06/17/19 1519 06/17/19 1707 06/17/19 1737 06/17/19 1807   BP: 118/64 129/84 129/84    BP Location:  Right arm     Patient Position:   "Lying     Pulse: 59 62 57 61   Resp:  20     Temp:  97.9 °F (36.6 °C)     TempSrc:  Oral     SpO2: 98% 96%     Weight:  83.6 kg (184 lb 4.9 oz)     Height:  172.7 cm (68\")         Flowsheet Rows      First Filed Value   Admission Height  172.7 cm (68\") Documented at 06/17/2019 1057   Admission Weight  86 kg (189 lb 9.5 oz) Documented at 06/17/2019 1057           TELEMETRY: Sinus rhythm    Physical Exam:  The patient is alert, oriented and in no distress.  Vital signs as noted above.  Head and neck revealed no carotid bruits or jugular venous distention.  No thyromegaly or lymph adenopathy is present  Lungs clear.  No wheezing.  Breath sounds are normal bilaterally.  Heart normal first and second heart sounds.No murmur.  No precordial rub is present.  No gallop is present.  Abdomen soft and nontender.  No organomegaly is present.  Extremities with good peripheral pulses without any pedal edema.  Skin warm and dry.  Musculoskeletal system is grossly normal  CNS grossly normal      Results Review:   I reviewed the patient's new clinical results.  Lab Results (last 24 hours)     Procedure Component Value Units Date/Time    CBC & Differential [[id number]] Collected:  06/17/19 1203    Specimen:  Blood Updated:  06/17/19 1410    Narrative:       The following orders were created for panel order CBC & Differential.  Procedure                               Abnormality         Status                     ---------                               -----------         ------                     Scan Slide[[id number]]                                                                  CBC Auto Differential[[id number]]        Abnormal            Final result                 Please view results for these tests on the individual orders.    CBC Auto Differential [[id number]]  (Abnormal) Collected:  06/17/19 1203    Specimen:  Blood Updated:  06/17/19 1410     WBC 6.40 10*3/mm3      RBC 5.42 10*6/mm3      Hemoglobin 12.0 g/dL      Hematocrit " 38.5 %      MCV 71.0 fL      MCH 22.2 pg      MCHC 31.3 g/dL      RDW 16.5 %      RDW-SD 41.1 fl      MPV 10.4 fL      Platelets 199 10*3/mm3      Neutrophil % 63.8 %      Lymphocyte % 25.6 %      Monocyte % 9.3 %      Eosinophil % 0.7 %      Basophil % 0.6 %      Neutrophils, Absolute 4.10 10*3/mm3      Lymphocytes, Absolute 1.60 10*3/mm3      Monocytes, Absolute 0.60 10*3/mm3      Eosinophils, Absolute 0.00 10*3/mm3      Basophils, Absolute 0.00 10*3/mm3      nRBC 0.0 /100 WBC     Protime-INR [[id number]]  (Normal) Collected:  06/17/19 1203    Specimen:  Blood Updated:  06/17/19 1340     Protime 10.7 Seconds      INR 1.04    BNP [[id number]]  (Normal) Collected:  06/17/19 1203    Specimen:  Blood Updated:  06/17/19 1322     BNP 30.0 pg/mL      Comment: Results may be falsely decreased if patient taking Biotin.       [place] Draw [[id number]] Collected:  06/17/19 1203    Specimen:  Blood Updated:  06/17/19 1315    Narrative:       The following orders were created for panel order [place] Draw.  Procedure                               Abnormality         Status                     ---------                               -----------         ------                     Light Blue Top[[id number]]                                   Final result               Green Top (Gel)[[id number]]                                  Final result               Lavender Top[[id number]]                                     Final result               Gold Top - SST[[id number]]                                   Final result                 Please view results for these tests on the individual orders.    Gold Top - SST [[id number]] Collected:  06/17/19 1203    Specimen:  Blood Updated:  06/17/19 1315     Extra Tube Hold for add-ons.     Comment: Auto resulted.       Light Blue Top [[id number]] Collected:  06/17/19 1203    Specimen:  Blood Updated:  06/17/19 1315     Extra Tube hold for add-on     Comment: Auto resulted       Green Top (Gel) [[id number]]  Collected:  06/17/19 1203    Specimen:  Blood Updated:  06/17/19 1315     Extra Tube Hold for add-ons.     Comment: Auto resulted.       Lavender Top [[id number]] Collected:  06/17/19 1203    Specimen:  Blood Updated:  06/17/19 1315     Extra Tube hold for add-on     Comment: Auto resulted       Comprehensive Metabolic Panel [[id number]]  (Abnormal) Collected:  06/17/19 1203    Specimen:  Blood Updated:  06/17/19 1240     Glucose 105 mg/dL      BUN 11 mg/dL      Creatinine 0.70 mg/dL      Sodium 140 mmol/L      Potassium 4.1 mmol/L      Chloride 105 mmol/L      CO2 22.0 mmol/L      Calcium 9.4 mg/dL      Total Protein 6.0 g/dL      Albumin 3.90 g/dL      ALT (SGPT) 15 U/L      AST (SGOT) 18 U/L      Alkaline Phosphatase 51 U/L      Total Bilirubin 1.5 mg/dL      eGFR Non African Amer 111 mL/min/1.73      Globulin 2.1 gm/dL      A/G Ratio 1.9 g/dL      BUN/Creatinine Ratio 15.7     Anion Gap 13.0 mmol/L     Troponin [[id number]]  (Normal) Collected:  06/17/19 1203    Specimen:  Blood Updated:  06/17/19 1234     Troponin I <0.030 ng/mL     Narrative:       Troponin I Reference Range:    0.00-0.03  Negative.  Repeat testing in 4-6 hours if clinically indicated.    0.04-0.29  Suspicious for myocardial injury. Serial measurements and clinical  correlation may be necessary to confirm or exclude diagnosis of acute  coronary syndrome.  Repeat testing in 4-6 hours if indicated.     >0.29 Consistent with myocardial injury.  Recommend clinical and laboratory correlation.     Results my be falsely decreased if patient taking Biotin.           Imaging Results (last 24 hours)     Procedure Component Value Units Date/Time    XR Chest 1 View [[id number]] Collected:  06/17/19 1305     Updated:  06/17/19 1305    Narrative:       DATE OF EXAM:  6/17/2019 12:53 PM     PROCEDURE:  XR CHEST 1 VW-     INDICATIONS:  Chest pain and shortness of breath     COMPARISON:  No Comparisons Available     TECHNIQUE:   Single radiographic view of the  chest was obtained.     FINDINGS:  The heart size and pulmonary vascular markings are normal. The lungs are  clear. The osseous structures are normal.        Impression:       No active disease     Electronically Signed By-Pelon Beal On:6/17/2019 1:05 PM  This report was finalized on [id number] by  Pelon Beal, .          EKG      I personally viewed and interpreted the patient's EKG/Telemetry data: Normal sinus rhythm nonspecific ST-T wave changes    ECHOCARDIOGRAM:             STRESS MYOVIEW:    Cardiolite (Tc-99m Sestamibi) stress test          CARDIAC CATHETERIZATION:    Procedure to perform: PROCEDURES; CARDIAC CATHETERIZATION:19961    OTHER:         Assessment/Plan    [[[[[[[[[[[[[[[[[[[[[  Impression  ==============  -Chest pain possible angina pectoris.  EKG showed no acute changes.  Troponin levels are normal.    -status post stent to diagonal branch 06/21/2010 .    Stress Cardiolite test 06/15/2017 revealed reproducible chest discomfort 1.5 mm of inferior and anterolateral ST depression and inferior and apical moderate to significant ischemia.    Cardiac catheterization 06/21/2017 revealed normal left ventricular function diagonal branch stent was patent.  No significant disease was present. Please note RCA was engaged using 3D RC catheter.    - chest discomfort Extreme weakness and tiredness -Improved since lisinopril  was discontinued.     -hypertension and dyslipidemia    -recently diagnosed Solis's esophagitis     -strong family history of coronary artery disease     -former smoker     -allergy to penicillin.  ================    Plan  ============  EKG showed sinus bradycardia without any ischemic changes  Chest pain suggestive of angina pectoris.  Stress Cardiolite test.  Echocardiogram    Medications were reviewed and updated.  Further plan will depend on patient's progress.  Have discussed with patient's family at bedside.  [[[[[[[[[[[[[[[[[[[[[[[[[[            Principal Problem:     Precordial pain  Active Problems:    Hypertension    Hyperlipidemia    Gastric reflux    CAD (coronary artery disease)    Solis esophagus        Talia Hickey MD  06/17/19  7:39 PM

## 2019-06-18 ENCOUNTER — HOSPITAL ENCOUNTER (OUTPATIENT)
Dept: NUCLEAR MEDICINE | Facility: HOSPITAL | Age: 69
Setting detail: OBSERVATION
Discharge: HOME | End: 2019-06-18

## 2019-06-18 ENCOUNTER — HOSPITAL ENCOUNTER (OUTPATIENT)
Dept: CARDIOLOGY | Facility: HOSPITAL | Age: 69
Setting detail: OBSERVATION
Discharge: HOME | End: 2019-06-18

## 2019-06-18 LAB
TROPONIN I SERPL-MCNC: <0.03 NG/ML (ref 0–0.03)
TROPONIN I SERPL-MCNC: <0.03 NG/ML (ref 0–0.03)

## 2019-06-18 PROCEDURE — 93306 TTE W/DOPPLER COMPLETE: CPT

## 2019-06-18 PROCEDURE — G0378 HOSPITAL OBSERVATION PER HR: HCPCS

## 2019-06-18 PROCEDURE — A9500 TC99M SESTAMIBI: HCPCS | Performed by: NURSE PRACTITIONER

## 2019-06-18 PROCEDURE — 93016 CV STRESS TEST SUPVJ ONLY: CPT | Performed by: NURSE PRACTITIONER

## 2019-06-18 PROCEDURE — 78452 HT MUSCLE IMAGE SPECT MULT: CPT

## 2019-06-18 PROCEDURE — 93017 CV STRESS TEST TRACING ONLY: CPT

## 2019-06-18 PROCEDURE — 84484 ASSAY OF TROPONIN QUANT: CPT | Performed by: NURSE PRACTITIONER

## 2019-06-18 PROCEDURE — 0 TECHNETIUM SESTAMIBI: Performed by: NURSE PRACTITIONER

## 2019-06-18 PROCEDURE — 99225 PR SBSQ OBSERVATION CARE/DAY 25 MINUTES: CPT | Performed by: INTERNAL MEDICINE

## 2019-06-18 RX ADMIN — CLOPIDOGREL BISULFATE 75 MG: 75 TABLET ORAL at 10:30

## 2019-06-18 RX ADMIN — PANTOPRAZOLE SODIUM 40 MG: 40 TABLET, DELAYED RELEASE ORAL at 10:30

## 2019-06-18 RX ADMIN — Medication 3 ML: at 20:30

## 2019-06-18 RX ADMIN — TECHNETIUM TC 99M SESTAMIBI 1 DOSE: 1 INJECTION INTRAVENOUS at 10:15

## 2019-06-18 RX ADMIN — MAGNESIUM OXIDE TAB 400 MG (241.3 MG ELEMENTAL MG) 400 MG: 400 (241.3 MG) TAB at 20:30

## 2019-06-18 RX ADMIN — ATORVASTATIN CALCIUM 10 MG: 10 TABLET, FILM COATED ORAL at 10:30

## 2019-06-18 RX ADMIN — ASPIRIN 325 MG ORAL TABLET 325 MG: 325 PILL ORAL at 10:29

## 2019-06-18 RX ADMIN — Medication 3 ML: at 10:33

## 2019-06-18 RX ADMIN — TECHNETIUM TC 99M SESTAMIBI 1 DOSE: 1 INJECTION INTRAVENOUS at 08:30

## 2019-06-18 NOTE — PLAN OF CARE
Problem: Cardiac: ACS (Acute Coronary Syndrome) (Adult)  Goal: Signs and Symptoms of Listed Potential Problems Will be Absent, Minimized or Managed (Cardiac: ACS)  Outcome: Ongoing (interventions implemented as appropriate)   06/18/19 0255   Goal/Outcome Evaluation   Problems Assessed (Acute Coronary Syndrome) all   Problems Present (Acute Coronary Syn) none

## 2019-06-18 NOTE — PLAN OF CARE
Problem: Patient Care Overview  Goal: Plan of Care Review  Outcome: Ongoing (interventions implemented as appropriate)   06/18/19 7194   Coping/Psychosocial   Plan of Care Reviewed With patient   Plan of Care Review   Progress improving

## 2019-06-18 NOTE — PROGRESS NOTES
Discharge Planning Assessment   Sebastian     Patient Name: Evaristo Barlow  MRN: [id number]  Today's Date: 6/18/2019    Admit Date: 6/17/2019    Discharge Needs Assessment     Row Name 06/18/19 9874       Living Environment    Lives With  spouse    Current Living Arrangements  home/apartment/condo    Primary Care Provided by  self    Provides Primary Care For  no one    Family Caregiver if Needed  spouse    Able to Return to Prior Arrangements  yes       Resource/Environmental Concerns    Resource/Environmental Concerns  none    Transportation Concerns  car, none       Transition Planning    Patient/Family Anticipates Transition to  home    Patient/Family Anticipated Services at Transition  none    Transportation Anticipated  car, drives self       Discharge Needs Assessment    Readmission Within the Last 30 Days  no previous admission in last 30 days    Concerns to be Addressed  no discharge needs identified;denies needs/concerns at this time    Equipment Currently Used at Home  none    Anticipated Changes Related to Illness  none    Equipment Needed After Discharge  none    Discharge Coordination/Progress  Patient states no trouble affording medications, PCP [place]        Discharge Plan     Row Name 06/18/19 3677       Plan    Plan  Routine home    Patient/Family in Agreement with Plan  yes          Laura Nicole RN

## 2019-06-18 NOTE — PLAN OF CARE
Problem: Patient Care Overview  Goal: Discharge Needs Assessment  Outcome: Ongoing (interventions implemented as appropriate)    Goal: Interprofessional Rounds/Family Conf  Outcome: Ongoing (interventions implemented as appropriate)   06/18/19 0255   Interdisciplinary Rounds/Family Conf   Participants patient

## 2019-06-18 NOTE — PLAN OF CARE
Problem: Patient Care Overview  Goal: Discharge Needs Assessment  Outcome: Ongoing (interventions implemented as appropriate)   06/18/19 0488   Discharge Needs Assessment   Readmission Within the Last 30 Days no previous admission in last 30 days

## 2019-06-18 NOTE — PLAN OF CARE
Problem: Patient Care Overview  Goal: Individualization and Mutuality  Outcome: Ongoing (interventions implemented as appropriate)   06/18/19 0303   Individualization   Patient Specific Preferences none

## 2019-06-18 NOTE — PLAN OF CARE
Problem: Patient Care Overview  Goal: Plan of Care Review  Outcome: Ongoing (interventions implemented as appropriate)   06/18/19 6972   Coping/Psychosocial   Plan of Care Reviewed With patient   Plan of Care Review   Progress improving   OTHER   Outcome Summary pt has had no chest pain throughout the day     Goal: Individualization and Mutuality  Outcome: Ongoing (interventions implemented as appropriate)      Problem: Cardiac: ACS (Acute Coronary Syndrome) (Adult)  Goal: Signs and Symptoms of Listed Potential Problems Will be Absent, Minimized or Managed (Cardiac: ACS)  Outcome: Ongoing (interventions implemented as appropriate)

## 2019-06-18 NOTE — PLAN OF CARE
Problem: Patient Care Overview  Goal: Plan of Care Review  Outcome: Ongoing (interventions implemented as appropriate)   06/17/19 0705   Coping/Psychosocial   Plan of Care Reviewed With patient     Goal: Individualization and Mutuality  Outcome: Ongoing (interventions implemented as appropriate)   06/17/19 0705   Individualization   Patient Specific Preferences none     Goal: Discharge Needs Assessment  Outcome: Ongoing (interventions implemented as appropriate)   06/17/19 0705   Discharge Needs Assessment   Concerns to be Addressed no discharge needs identified     Goal: Interprofessional Rounds/Family Conf  Outcome: Ongoing (interventions implemented as appropriate)   06/17/19 0705   Interdisciplinary Rounds/Family Conf   Participants patient

## 2019-06-19 VITALS
RESPIRATION RATE: 16 BRPM | DIASTOLIC BLOOD PRESSURE: 74 MMHG | BODY MASS INDEX: 27.93 KG/M2 | WEIGHT: 184.3 LBS | HEART RATE: 52 BPM | SYSTOLIC BLOOD PRESSURE: 128 MMHG | OXYGEN SATURATION: 96 % | HEIGHT: 68 IN | TEMPERATURE: 97.9 F

## 2019-06-19 LAB
ANION GAP SERPL CALCULATED.3IONS-SCNC: 7 MMOL/L (ref 10–20)
BASOPHILS # BLD AUTO: 0 10*3/MM3 (ref 0–0.2)
BASOPHILS NFR BLD AUTO: 0.5 % (ref 0–1.5)
BH CV ECHO MEAS - % IVS THICK: 24.3 %
BH CV ECHO MEAS - % LVPW THICK: 25.4 %
BH CV ECHO MEAS - ACS: 1.8 CM
BH CV ECHO MEAS - AO MAX PG (FULL): 5.7 MMHG
BH CV ECHO MEAS - AO MAX PG: 14.1 MMHG
BH CV ECHO MEAS - AO MEAN PG (FULL): 2.7 MMHG
BH CV ECHO MEAS - AO MEAN PG: 7.1 MMHG
BH CV ECHO MEAS - AO ROOT AREA: 7.3 CM^2
BH CV ECHO MEAS - AO ROOT DIAM: 3 CM
BH CV ECHO MEAS - AO V2 MAX: 187.9 CM/SEC
BH CV ECHO MEAS - AO V2 MEAN: 121.2 CM/SEC
BH CV ECHO MEAS - AO V2 VTI: 36.9 CM
BH CV ECHO MEAS - ASC AORTA: 2.8 CM
BH CV ECHO MEAS - AVA(I,A): 2.3 CM^2
BH CV ECHO MEAS - AVA(I,D): 2.3 CM^2
BH CV ECHO MEAS - AVA(V,A): 2.1 CM^2
BH CV ECHO MEAS - AVA(V,D): 2.1 CM^2
BH CV ECHO MEAS - EDV(CUBED): 66.9 ML
BH CV ECHO MEAS - EDV(MOD-SP4): 87 ML
BH CV ECHO MEAS - EDV(TEICH): 72.5 ML
BH CV ECHO MEAS - EF(CUBED): 74.5 %
BH CV ECHO MEAS - EF(MOD-SP4): 51.1 %
BH CV ECHO MEAS - EF(TEICH): 66.8 %
BH CV ECHO MEAS - ESV(CUBED): 17.1 ML
BH CV ECHO MEAS - ESV(MOD-SP4): 42.5 ML
BH CV ECHO MEAS - ESV(TEICH): 24.1 ML
BH CV ECHO MEAS - FS: 36.5 %
BH CV ECHO MEAS - IVS/LVPW: 1.2
BH CV ECHO MEAS - IVSD: 1.3 CM
BH CV ECHO MEAS - IVSS: 1.6 CM
BH CV ECHO MEAS - LA DIMENSION(2D): 3.9 CM
BH CV ECHO MEAS - LV MASS(C)D: 164.1 GRAMS
BH CV ECHO MEAS - LV MASS(C)S: 125.5 GRAMS
BH CV ECHO MEAS - LV MAX PG: 8.4 MMHG
BH CV ECHO MEAS - LV MEAN PG: 4.4 MMHG
BH CV ECHO MEAS - LV V1 MAX: 144.8 CM/SEC
BH CV ECHO MEAS - LV V1 MEAN: 99.8 CM/SEC
BH CV ECHO MEAS - LV V1 VTI: 30.4 CM
BH CV ECHO MEAS - LVIDD: 4.1 CM
BH CV ECHO MEAS - LVIDS: 2.6 CM
BH CV ECHO MEAS - LVOT AREA: 2.7 CM^2
BH CV ECHO MEAS - LVOT DIAM: 1.9 CM
BH CV ECHO MEAS - LVPWD: 1.1 CM
BH CV ECHO MEAS - LVPWS: 1.4 CM
BH CV ECHO MEAS - MV MAX PG: 3.6 MMHG
BH CV ECHO MEAS - MV MEAN PG: 1.6 MMHG
BH CV ECHO MEAS - MV V2 MAX: 95.1 CM/SEC
BH CV ECHO MEAS - MV V2 MEAN: 58.4 CM/SEC
BH CV ECHO MEAS - MV V2 VTI: 31.8 CM
BH CV ECHO MEAS - MVA(VTI): 2.6 CM^2
BH CV ECHO MEAS - PA ACC TIME: 0.11 SEC
BH CV ECHO MEAS - PA MAX PG (FULL): 2.5 MMHG
BH CV ECHO MEAS - PA MAX PG: 4.8 MMHG
BH CV ECHO MEAS - PA MEAN PG (FULL): 1.6 MMHG
BH CV ECHO MEAS - PA MEAN PG: 2.6 MMHG
BH CV ECHO MEAS - PA PR(ACCEL): 29.2 MMHG
BH CV ECHO MEAS - PA V2 MAX: 109.4 CM/SEC
BH CV ECHO MEAS - PA V2 MEAN: 76.3 CM/SEC
BH CV ECHO MEAS - PA V2 VTI: 22.2 CM
BH CV ECHO MEAS - PVA(I,A): 4.2 CM^2
BH CV ECHO MEAS - PVA(I,D): 4.2 CM^2
BH CV ECHO MEAS - PVA(V,A): 3.8 CM^2
BH CV ECHO MEAS - PVA(V,D): 3.8 CM^2
BH CV ECHO MEAS - QP/QS: 1.1
BH CV ECHO MEAS - RV MAX PG: 2.3 MMHG
BH CV ECHO MEAS - RV MEAN PG: 1 MMHG
BH CV ECHO MEAS - RV V1 MAX: 75.9 CM/SEC
BH CV ECHO MEAS - RV V1 MEAN: 47.7 CM/SEC
BH CV ECHO MEAS - RV V1 VTI: 17.2 CM
BH CV ECHO MEAS - RVDD: 3.3 CM
BH CV ECHO MEAS - RVOT AREA: 5.5 CM^2
BH CV ECHO MEAS - RVOT DIAM: 2.6 CM
BH CV ECHO MEAS - SV(AO): 268.1 ML
BH CV ECHO MEAS - SV(CUBED): 49.8 ML
BH CV ECHO MEAS - SV(LVOT): 83.3 ML
BH CV ECHO MEAS - SV(MOD-SP4): 44.5 ML
BH CV ECHO MEAS - SV(RVOT): 94.2 ML
BH CV ECHO MEAS - SV(TEICH): 48.5 ML
BUN BLD-MCNC: 14 MG/DL (ref 8–20)
BUN/CREAT SERPL: 14 (ref 6.2–20.3)
CALCIUM SPEC-SCNC: 8.9 MG/DL (ref 8.9–10.3)
CHLORIDE SERPL-SCNC: 104 MMOL/L (ref 101–111)
CO2 SERPL-SCNC: 26 MMOL/L (ref 22–32)
CREAT BLD-MCNC: 1 MG/DL (ref 0.7–1.2)
DEPRECATED RDW RBC AUTO: 42 FL (ref 37–54)
EOSINOPHIL # BLD AUTO: 0.1 10*3/MM3 (ref 0–0.4)
EOSINOPHIL NFR BLD AUTO: 1.7 % (ref 0.3–6.2)
ERYTHROCYTE [DISTWIDTH] IN BLOOD BY AUTOMATED COUNT: 16.8 % (ref 12.3–15.4)
GFR SERPL CREATININE-BSD FRML MDRD: 74 ML/MIN/1.73
GLUCOSE BLD-MCNC: 107 MG/DL (ref 65–99)
HCT VFR BLD AUTO: 39.7 % (ref 37.5–51)
HGB BLD-MCNC: 12.5 G/DL (ref 13–17.7)
LYMPHOCYTES # BLD AUTO: 1.7 10*3/MM3 (ref 0.7–3.1)
LYMPHOCYTES NFR BLD AUTO: 26.5 % (ref 19.6–45.3)
MAGNESIUM SERPL-MCNC: 2 MG/DL (ref 1.8–2.5)
MAXIMAL PREDICTED HEART RATE: 150 BPM
MCH RBC QN AUTO: 22.2 PG (ref 26.6–33)
MCHC RBC AUTO-ENTMCNC: 31.4 G/DL (ref 31.5–35.7)
MCV RBC AUTO: 70.8 FL (ref 79–97)
MONOCYTES # BLD AUTO: 0.8 10*3/MM3 (ref 0.1–0.9)
MONOCYTES NFR BLD AUTO: 13 % (ref 5–12)
NEUTROPHILS # BLD AUTO: 3.7 10*3/MM3 (ref 1.7–7)
NEUTROPHILS NFR BLD AUTO: 58.3 % (ref 42.7–76)
NRBC BLD AUTO-RTO: 0 /100 WBC (ref 0–0.2)
PLATELET # BLD AUTO: 187 10*3/MM3 (ref 140–450)
PMV BLD AUTO: 10.3 FL (ref 6–12)
POTASSIUM BLD-SCNC: 3.7 MMOL/L (ref 3.6–5.1)
RBC # BLD AUTO: 5.61 10*6/MM3 (ref 4.14–5.8)
SODIUM BLD-SCNC: 137 MMOL/L (ref 136–144)
STRESS TARGET HR: 128 BPM
WBC NRBC COR # BLD: 6.3 10*3/MM3 (ref 3.4–10.8)

## 2019-06-19 PROCEDURE — 83735 ASSAY OF MAGNESIUM: CPT | Performed by: INTERNAL MEDICINE

## 2019-06-19 PROCEDURE — G0378 HOSPITAL OBSERVATION PER HR: HCPCS

## 2019-06-19 PROCEDURE — 99217 PR OBSERVATION CARE DISCHARGE MANAGEMENT: CPT | Performed by: INTERNAL MEDICINE

## 2019-06-19 PROCEDURE — 80048 BASIC METABOLIC PNL TOTAL CA: CPT | Performed by: INTERNAL MEDICINE

## 2019-06-19 PROCEDURE — 99214 OFFICE O/P EST MOD 30 MIN: CPT | Performed by: INTERNAL MEDICINE

## 2019-06-19 PROCEDURE — 93306 TTE W/DOPPLER COMPLETE: CPT | Performed by: INTERNAL MEDICINE

## 2019-06-19 PROCEDURE — 85025 COMPLETE CBC W/AUTO DIFF WBC: CPT | Performed by: INTERNAL MEDICINE

## 2019-06-19 RX ADMIN — PANTOPRAZOLE SODIUM 40 MG: 40 TABLET, DELAYED RELEASE ORAL at 09:19

## 2019-06-19 RX ADMIN — ASPIRIN 325 MG ORAL TABLET 325 MG: 325 PILL ORAL at 09:19

## 2019-06-19 RX ADMIN — CLOPIDOGREL BISULFATE 75 MG: 75 TABLET ORAL at 09:19

## 2019-06-19 RX ADMIN — ATORVASTATIN CALCIUM 10 MG: 10 TABLET, FILM COATED ORAL at 09:19

## 2019-06-19 RX ADMIN — Medication 3 ML: at 09:19

## 2019-06-19 NOTE — PLAN OF CARE
Problem: Patient Care Overview  Goal: Plan of Care Review  Outcome: Ongoing (interventions implemented as appropriate)   06/19/19 0349   Coping/Psychosocial   Plan of Care Reviewed With patient     Goal: Individualization and Mutuality  Outcome: Ongoing (interventions implemented as appropriate)    Goal: Discharge Needs Assessment  Outcome: Ongoing (interventions implemented as appropriate)    Goal: Interprofessional Rounds/Family Conf  Outcome: Ongoing (interventions implemented as appropriate)      Problem: Cardiac: ACS (Acute Coronary Syndrome) (Adult)  Goal: Signs and Symptoms of Listed Potential Problems Will be Absent, Minimized or Managed (Cardiac: ACS)  Outcome: Ongoing (interventions implemented as appropriate)

## 2019-06-19 NOTE — DISCHARGE SUMMARY
Date of Admission: 6/17/2019    Date of Discharge:  6/19/2019    Length of stay:  LOS: 0 days     Admission Diagnosis: Chest Pain    Discharge Diagnosis:     Acute chest pain, ACS ruled out   - serial troponin negative   - stress Myoview - negative for reversible ischemia   - symptoms resolved  - f/u with Dr. Hickey in 2 weeks      CAD, h/o stent to diagonal branch (06/21/2010)  -6/15/17 stress Cardiolite revealed reproducible chest discomfort 1.5 mm of inferior and anterolateral ST depression and inferior and apical moderate to significant ischemia.   -6/21/17 LHC revealed normal left ventricular function diagonal branch stent was patent.  No significant disease was present. Please note RCA was engaged using 3D RC catheter.  - continue aspirin, Plavix, and statin, imdur      Essential HTN, chronic, controlled  - patient states he was taken off antihypertensives after he lost weight     HLD  - continue statin     GERD / History of Solis's esophagitis  - continue PPI      Former smoker          Hospital Course:  Patient is a 68 y.o. male who presented to the ED on 06/17/2019 with complaint of chest pain. The patient states he had some dull heaviness in the center of his chest this morning. He states it became more severe, but did not radiate. He also reports some epigastric discomfort. He denies shortness of breath, diaphoresis, dizziness, palpitations, nausea, or vomiting. He states he took 1 sublingual nitroglycerin at home prior to arrival, but did not get any relief. He denies any exacerbating or alleviating factors. He is unsure when his last stress test was done, but states he saw his cardiologist in March 2019.      Upon arrival to the ER the patient was started on Tridil drip and given aspirin 325 mg PO. His troponin was negative. His EKG showed no acute ischemia. His CXR and labs were unremarkable. He will be admitted for observation and further evaluation.  Patient was evaluated by Dr. Hickey, his  cardiologist, and underwent cardiac stress test that was negative for reversible ischemia. Patient had resolution of his symptoms and will be discharged home in stable condition. Patient will be continued on his home meds and is encouraged to return to the ED if symptoms return and to follow up with pcp and cardiology.            Procedures Performed:         Consults:   Consults     Date and Time Order Name Status Description    6/17/2019 1511 Hospitalist (on-call MD unless specified) Completed           Vital Signs:  Temp:  [97.9 °F (36.6 °C)] 97.9 °F (36.6 °C)  Heart Rate:  [52-55] 52  Resp:  [15-16] 16  BP: (125-128)/(65-74) 128/74      Physical Exam:  Physical Exam   Constitutional: He appears well-developed and well-nourished.   Cardiovascular: Normal rate and regular rhythm.   Pulmonary/Chest: Effort normal and breath sounds normal.   Abdominal: Soft. Bowel sounds are normal.             Pertinent Test Results:     Lab Results (last 72 hours)     Procedure Component Value Units Date/Time    CBC & Differential [[id number]] Collected:  06/19/19 0311    Specimen:  Blood Updated:  06/19/19 0532    Narrative:       The following orders were created for panel order CBC & Differential.  Procedure                               Abnormality         Status                     ---------                               -----------         ------                     Scan Slide[[id number]]                                                                  CBC Auto Differential[[id number]]        Abnormal            Final result                 Please view results for these tests on the individual orders.    CBC Auto Differential [[id number]]  (Abnormal) Collected:  06/19/19 0311    Specimen:  Blood Updated:  06/19/19 0532     WBC 6.30 10*3/mm3      RBC 5.61 10*6/mm3      Hemoglobin 12.5 g/dL      Hematocrit 39.7 %      MCV 70.8 fL      MCH 22.2 pg      MCHC 31.4 g/dL      RDW 16.8 %      RDW-SD 42.0 fl      MPV 10.3 fL       Platelets 187 10*3/mm3      Neutrophil % 58.3 %      Lymphocyte % 26.5 %      Monocyte % 13.0 %      Eosinophil % 1.7 %      Basophil % 0.5 %      Neutrophils, Absolute 3.70 10*3/mm3      Lymphocytes, Absolute 1.70 10*3/mm3      Monocytes, Absolute 0.80 10*3/mm3      Eosinophils, Absolute 0.10 10*3/mm3      Basophils, Absolute 0.00 10*3/mm3      nRBC 0.0 /100 WBC     Narrative:       Appended report. These results have been appended to a previously verified report.    Basic Metabolic Panel [[id number]]  (Abnormal) Collected:  06/19/19 0311    Specimen:  Blood Updated:  06/19/19 0416     Glucose 107 mg/dL      BUN 14 mg/dL      Creatinine 1.00 mg/dL      Sodium 137 mmol/L      Potassium 3.7 mmol/L      Chloride 104 mmol/L      CO2 26.0 mmol/L      Calcium 8.9 mg/dL      eGFR Non African Amer 74 mL/min/1.73      BUN/Creatinine Ratio 14.0     Anion Gap 7.0 mmol/L     Magnesium [[id number]]  (Normal) Collected:  06/19/19 0311    Specimen:  Blood Updated:  06/19/19 0416     Magnesium 2.0 mg/dL     Troponin [[id number]]  (Normal) Collected:  06/18/19 0601    Specimen:  Blood Updated:  06/18/19 0700     Troponin I <0.030 ng/mL     Narrative:       Troponin I Reference Range:    0.00-0.03  Negative.  Repeat testing in 4-6 hours if clinically indicated.    0.04-0.29  Suspicious for myocardial injury. Serial measurements and clinical  correlation may be necessary to confirm or exclude diagnosis of acute  coronary syndrome.  Repeat testing in 4-6 hours if indicated.     >0.29 Consistent with myocardial injury.  Recommend clinical and laboratory correlation.     Results my be falsely decreased if patient taking Biotin.     Troponin [[id number]]  (Normal) Collected:  06/17/19 2329    Specimen:  Blood Updated:  06/18/19 0054     Troponin I <0.030 ng/mL     Narrative:       Troponin I Reference Range:    0.00-0.03  Negative.  Repeat testing in 4-6 hours if clinically indicated.    0.04-0.29  Suspicious for myocardial injury. Serial  measurements and clinical  correlation may be necessary to confirm or exclude diagnosis of acute  coronary syndrome.  Repeat testing in 4-6 hours if indicated.     >0.29 Consistent with myocardial injury.  Recommend clinical and laboratory correlation.     Results my be falsely decreased if patient taking Biotin.     CBC & Differential [[id number]] Collected:  06/17/19 1203    Specimen:  Blood Updated:  06/17/19 1410    Narrative:       The following orders were created for panel order CBC & Differential.  Procedure                               Abnormality         Status                     ---------                               -----------         ------                     Scan Slide[[id number]]                                                                  CBC Auto Differential[[id number]]        Abnormal            Final result                 Please view results for these tests on the individual orders.    CBC Auto Differential [[id number]]  (Abnormal) Collected:  06/17/19 1203    Specimen:  Blood Updated:  06/17/19 1410     WBC 6.40 10*3/mm3      RBC 5.42 10*6/mm3      Hemoglobin 12.0 g/dL      Hematocrit 38.5 %      MCV 71.0 fL      MCH 22.2 pg      MCHC 31.3 g/dL      RDW 16.5 %      RDW-SD 41.1 fl      MPV 10.4 fL      Platelets 199 10*3/mm3      Neutrophil % 63.8 %      Lymphocyte % 25.6 %      Monocyte % 9.3 %      Eosinophil % 0.7 %      Basophil % 0.6 %      Neutrophils, Absolute 4.10 10*3/mm3      Lymphocytes, Absolute 1.60 10*3/mm3      Monocytes, Absolute 0.60 10*3/mm3      Eosinophils, Absolute 0.00 10*3/mm3      Basophils, Absolute 0.00 10*3/mm3      nRBC 0.0 /100 WBC     Protime-INR [[id number]]  (Normal) Collected:  06/17/19 1203    Specimen:  Blood Updated:  06/17/19 1340     Protime 10.7 Seconds      INR 1.04    BNP [[id number]]  (Normal) Collected:  06/17/19 1203    Specimen:  Blood Updated:  06/17/19 1322     BNP 30.0 pg/mL      Comment: Results may be falsely decreased if patient taking  Biotin.       Wing Draw [[id number]] Collected:  06/17/19 1203    Specimen:  Blood Updated:  06/17/19 1315    Narrative:       The following orders were created for panel order Wing Draw.  Procedure                               Abnormality         Status                     ---------                               -----------         ------                     Light Blue Top[[id number]]                                   Final result               Green Top (Gel)[[id number]]                                  Final result               Lavender Top[[id number]]                                     Final result               Gold Top - SST[[id number]]                                   Final result                 Please view results for these tests on the individual orders.    Gold Top - SST [[id number]] Collected:  06/17/19 1203    Specimen:  Blood Updated:  06/17/19 1315     Extra Tube Hold for add-ons.     Comment: Auto resulted.       Light Blue Top [[id number]] Collected:  06/17/19 1203    Specimen:  Blood Updated:  06/17/19 1315     Extra Tube hold for add-on     Comment: Auto resulted       Green Top (Gel) [[id number]] Collected:  06/17/19 1203    Specimen:  Blood Updated:  06/17/19 1315     Extra Tube Hold for add-ons.     Comment: Auto resulted.       Lavender Top [[id number]] Collected:  06/17/19 1203    Specimen:  Blood Updated:  06/17/19 1315     Extra Tube hold for add-on     Comment: Auto resulted       Comprehensive Metabolic Panel [[id number]]  (Abnormal) Collected:  06/17/19 1203    Specimen:  Blood Updated:  06/17/19 1240     Glucose 105 mg/dL      BUN 11 mg/dL      Creatinine 0.70 mg/dL      Sodium 140 mmol/L      Potassium 4.1 mmol/L      Chloride 105 mmol/L      CO2 22.0 mmol/L      Calcium 9.4 mg/dL      Total Protein 6.0 g/dL      Albumin 3.90 g/dL      ALT (SGPT) 15 U/L      AST (SGOT) 18 U/L      Alkaline Phosphatase 51 U/L      Total Bilirubin 1.5 mg/dL      eGFR Non African Amer 111 mL/min/1.73       Globulin 2.1 gm/dL      A/G Ratio 1.9 g/dL      BUN/Creatinine Ratio 15.7     Anion Gap 13.0 mmol/L     Troponin [[id number]]  (Normal) Collected:  06/17/19 1203    Specimen:  Blood Updated:  06/17/19 1234     Troponin I <0.030 ng/mL     Narrative:       Troponin I Reference Range:    0.00-0.03  Negative.  Repeat testing in 4-6 hours if clinically indicated.    0.04-0.29  Suspicious for myocardial injury. Serial measurements and clinical  correlation may be necessary to confirm or exclude diagnosis of acute  coronary syndrome.  Repeat testing in 4-6 hours if indicated.     >0.29 Consistent with myocardial injury.  Recommend clinical and laboratory correlation.     Results my be falsely decreased if patient taking Biotin.             Results for orders placed during the hospital encounter of 06/17/19   Adult Transthoracic Echo Complete W/ Cont if Necessary Per Protocol    Narrative Technically satisfactory study.  Mitral annular calcification is present.  Minimal mitral regurgitation is present.  Tricuspid valve is normal.  Aortic valve is thickened with decreased opening motion.  Gradient across   aortic valve is 15 mmHg.  No evidence for aortic regurgitation is present.  Left atrium is normal in size.  Left ventricle is normal in size and contractility with ejection fraction   of 65%.  No pericardial effusion or intracardiac thrombus is seen.  No evidence for aortic or tricuspid regurgitation is present.    Impression    Mild aortic valve stenosis.    Left ventricle size and contractility is normal with ejection fraction of   65%.    Mitral annular calcification is present.       Imaging Results (all)     Procedure Component Value Units Date/Time    XR Chest 1 View [[id number]] Collected:  06/17/19 1305     Updated:  06/17/19 1305    Narrative:       DATE OF EXAM:  6/17/2019 12:53 PM     PROCEDURE:  XR CHEST 1 VW-     INDICATIONS:  Chest pain and shortness of breath     COMPARISON:  No Comparisons Available      TECHNIQUE:   Single radiographic view of the chest was obtained.     FINDINGS:  The heart size and pulmonary vascular markings are normal. The lungs are  clear. The osseous structures are normal.        Impression:       No active disease     Electronically Signed By-Pelon Beal On:6/17/2019 1:05 PM  This report was finalized on [id number] by  Pelon Beal, .            Condition on Discharge: stable    Discharge Disposition:  Home or Self Care    Discharge Medications:     Discharge Medications      Continue These Medications      Instructions Start Date   aspirin 325 MG tablet   325 mg, Oral, Every Night at Bedtime      CALCIUM 600+D 600-400 MG-UNIT per tablet  Generic drug:  calcium carbonate-vitamin d   Oral, 2 Times Daily      coenzyme Q10 100 MG capsule   100 mg, Oral, Daily      isosorbide mononitrate 60 MG 24 hr tablet  Commonly known as:  IMDUR   60 mg, Oral, Daily      magnesium oxide 400 (241.3 Mg) MG tablet tablet  Commonly known as:  MAGOX   400 mg, Oral, Every Night at Bedtime      nitroglycerin 0.4 MG SL tablet  Commonly known as:  NITROSTAT   0.4 mg, Sublingual, Every 5 Minutes PRN, Take no more than 3 doses in 15 minutes.       NITROSTAT 0.4 MG SL tablet  Generic drug:  nitroglycerin   NITROSTAT 0.4 MG SUBL      omeprazole 40 MG capsule  Commonly known as:  priLOSEC   40 mg, Oral, Daily      PLAVIX 75 MG tablet  Generic drug:  clopidogrel   75 mg, Oral, Daily      Potassium 99 MG tablet   1 tablet, Oral, Daily      simvastatin 20 MG tablet  Commonly known as:  ZOCOR   20 mg, Oral, Daily             Discharge Diet:   Diet Instructions     Diet: Cardiac      Discharge Diet:  Cardiac          Activity at Discharge:   Activity Instructions     Activity as Tolerated            Follow-up Appointments:  Future Appointments   Date Time Provider Department Center   7/10/2019  3:40 PM Talia Hickey MD [facility]   9/17/2019  9:30 AM Talia Hickey MD [facility]   10/14/2019   9:30 AM Deidra Olea APRN [facility] None   11/5/2019  3:00 PM Seipel, Joseph F, MD [facility] None         Test Results Pending at Discharge:  None    Risk for Readmission (LACE): Score: 2 (6/19/2019  6:00 AM)          Remigio Cohen DO  06/19/19  7:59 PM

## 2019-06-19 NOTE — PROGRESS NOTES
"[facility] Hospitalist Team      Patient Care Team:  Deidra Olea APRN as PCP - General  Virgie Rios DPM as PCP - Claims Attributed  Talia Hickey MD as Consulting Physician (Cardiology)  Joann Aparicio APRN as Nurse Practitioner (Family Medicine)        Chief Complaint:  F/u chest pain    Subjective:     Patient denies any chest pain at this time         Objective:  Review of Systems   Constitutional: Negative.    Respiratory: Negative.    Gastrointestinal: Negative.          Vital Signs  Temp:  [97.6 °F (36.4 °C)-98.3 °F (36.8 °C)] 97.9 °F (36.6 °C)  Heart Rate:  [55-61] 55  Resp:  [15-18] 17  BP: (109-138)/(58-82) 131/80  Oxygen Therapy  SpO2: 97 %  Pulse Oximetry Type: Intermittent  Device (Oxygen Therapy): room air  Flowsheet Rows      First Filed Value   Admission Height  172.7 cm (68\") Documented at 06/17/2019 1057   Admission Weight  86 kg (189 lb 9.5 oz) Documented at 06/17/2019 1057        Intake & Output (last 3 days)       06/16 0701 - 06/17 0700 06/17 0701 - 06/18 0700 06/18 0701 - 06/19 0700    P.O.   120    Total Intake(mL/kg)   120 (1.4)    Net   +120           Urine Unmeasured Occurrence   2 x        Lines, Drains & Airways    Active LDAs     Name:   Placement date:   Placement time:   Site:   Days:    Peripheral IV 06/17/19 1136 Left Antecubital   06/17/19    1136    Antecubital   1                Physical Exam:    General: NAD, resting in bed  Eyes: PERRL, nonicteric  HENT: normocephalic, atraumatic, MMM, pharynx clear without erythema or exudate  Card: S1, S2, no murmurs  Resp: CTA b/l, no r/r/w  GI: soft, nt, nd, +bs  Skin: warm, dry, intact, no rashes  Extremities: no c/c/e  Neuro: CN II-XII grossly intact, no focal deficits  Psych: appropriate mood and affect        Results Review:       Lab Results (last 24 hours)     Procedure Component Value Units Date/Time    Troponin [[id number]]  (Normal) Collected:  06/18/19 0601    Specimen:  Blood Updated:  06/18/19 0700     " Troponin I <0.030 ng/mL     Narrative:       Troponin I Reference Range:    0.00-0.03  Negative.  Repeat testing in 4-6 hours if clinically indicated.    0.04-0.29  Suspicious for myocardial injury. Serial measurements and clinical  correlation may be necessary to confirm or exclude diagnosis of acute  coronary syndrome.  Repeat testing in 4-6 hours if indicated.     >0.29 Consistent with myocardial injury.  Recommend clinical and laboratory correlation.     Results my be falsely decreased if patient taking Biotin.     Troponin [[id number]]  (Normal) Collected:  06/17/19 2329    Specimen:  Blood Updated:  06/18/19 0054     Troponin I <0.030 ng/mL     Narrative:       Troponin I Reference Range:    0.00-0.03  Negative.  Repeat testing in 4-6 hours if clinically indicated.    0.04-0.29  Suspicious for myocardial injury. Serial measurements and clinical  correlation may be necessary to confirm or exclude diagnosis of acute  coronary syndrome.  Repeat testing in 4-6 hours if indicated.     >0.29 Consistent with myocardial injury.  Recommend clinical and laboratory correlation.     Results my be falsely decreased if patient taking Biotin.           Imaging Results (last 24 hours)     ** No results found for the last 24 hours. **                                   I reviewed the patient's new clinical results.          ECG/EMG Results (most recent)     Procedure Component Value Units Date/Time    Adult Transthoracic Echo Complete W/ Cont if Necessary Per Protocol [[id number]] Collected:  06/18/19 0930     Updated:  06/18/19 1507      CV ECHO [place] - RVDD 3.3 cm      IVSd 1.3 cm      IVSs 1.6 cm      LVIDd 4.1 cm      LVIDs 2.6 cm      LVPWd 1.1 cm       CV ECHO [place] - LVPWS 1.4 cm      IVS/LVPW 1.2     FS 36.5 %      EDV(Teich) 72.5 ml      ESV(Teich) 24.1 ml      EF(Teich) 66.8 %      EDV(cubed) 66.9 ml      ESV(cubed) 17.1 ml      EF(cubed) 74.5 %      % IVS thick 24.3 %      % LVPW thick 25.4 %      LV mass(C)d 164.1  grams      LV mass(C)s 125.5 grams      SV(Teich) 48.5 ml      SV(cubed) 49.8 ml      Ao root diam 3.0 cm      Ao root area 7.3 cm^2      ACS 1.8 cm      asc Aorta Diam 2.8 cm      LVOT diam 1.9 cm      LVOT area 2.7 cm^2      RVOT diam 2.6 cm      RVOT area 5.5 cm^2      EDV(MOD-sp4) 87.0 ml      ESV(MOD-sp4) 42.5 ml      EF(MOD-sp4) 51.1 %      SV(MOD-sp4) 44.5 ml      MV V2 max 95.1 cm/sec      MV max PG 3.6 mmHg      MV V2 mean 58.4 cm/sec      MV mean PG 1.6 mmHg      MV V2 VTI 31.8 cm      MVA(VTI) 2.6 cm^2      Ao pk perlita 187.9 cm/sec      Ao max PG 14.1 mmHg      Ao max PG (full) 5.7 mmHg      Ao V2 mean 121.2 cm/sec      Ao mean PG 7.1 mmHg      Ao mean PG (full) 2.7 mmHg      Ao V2 VTI 36.9 cm      ESCOBAR(I,A) 2.3 cm^2      ESCOBAR(I,D) 2.3 cm^2      ESCOBAR(V,A) 2.1 cm^2      ESCOBAR(V,D) 2.1 cm^2      LV V1 max PG 8.4 mmHg      LV V1 mean PG 4.4 mmHg      LV V1 max 144.8 cm/sec      LV V1 mean 99.8 cm/sec      LV V1 VTI 30.4 cm      SV(Ao) 268.1 ml      SV(LVOT) 83.3 ml      SV(RVOT) 94.2 ml      PA V2 max 109.4 cm/sec      PA max PG 4.8 mmHg      PA max PG (full) 2.5 mmHg      PA V2 mean 76.3 cm/sec      PA mean PG 2.6 mmHg      PA mean PG (full) 1.6 mmHg      PA V2 VTI 22.2 cm      PVA(I,A) 4.2 cm^2      BH CV ECHO [place]IS - PVA(I,D) 4.2 cm^2      BH CV ECHO [place] - PVA(V,A) 3.8 cm^2      BH CV ECHO [place] - PVA(V,D) 3.8 cm^2      PA acc time 0.11 sec      RV V1 max PG 2.3 mmHg      RV V1 mean PG 1.0 mmHg      RV V1 max 75.9 cm/sec      RV V1 mean 47.7 cm/sec      RV V1 VTI 17.2 cm      PA pr(Accel) 29.2 mmHg      Qp/Qs 1.1     Target HR (85%) 128 bpm      Max. Pred. HR (100%) 150 bpm      LA dimension(2D) 3.9 cm                  Medication Review:     Scheduled Meds:  GI cocktail  Oral Once   aspirin 325 mg Oral Daily   atorvastatin 10 mg Oral Daily   clopidogrel 75 mg Oral Daily   enoxaparin 40 mg Subcutaneous Q24H   magnesium oxide 400 mg Oral Nightly   pantoprazole 40 mg Oral QAM   sodium chloride 3 mL Intravenous Q12H      Continuous Infusions:  nitroglycerin 10-50 mcg/min Last Rate: 5 mcg/min (06/17/19 1233)     PRN Meds:.•  acetaminophen  •  aluminum-magnesium hydroxide-simethicone  •  melatonin  •  nitroglycerin  •  ondansetron **OR** ondansetron  •  [COMPLETED] Insert peripheral IV **AND** sodium chloride  •  sodium chloride      I have reviewed the patient's current medication list    Assessment/Plan     Acute chest pain  - r/o ACS   - serial troponin negative   - stress Myoview - abnormal  - continuous cardiac monitoring  - wean off nitro drip   - GI cocktail x 1  - cardiology consulted      CAD, h/o stent to diagonal branch (06/21/2010)  -6/15/17 stress Cardiolite revealed reproducible chest discomfort 1.5 mm of inferior and anterolateral ST depression and inferior and apical moderate to significant ischemia.   -6/21/17 LHC revealed normal left ventricular function diagonal branch stent was patent.  No significant disease was present. Please note RCA was engaged using 3D RC catheter.  - continue aspirin, Plavix, and statin  - hold Imdur while on Tridil gtt      Essential HTN, chronic, controlled  - patient states he was taken off antihypertensives after he lost weight  - monitor BP     HLD  - continue statin     GERD / History of Solis's esophagitis  - continue PPI      Former smoker     VTE prophylaxis   - Lovenox           Plan for disposition:nahum Cohen DO  06/18/19  9:50 PM

## 2019-06-19 NOTE — PROGRESS NOTES
Referring Provider: Remigio Cohen DO    Reason for follow-up:  Chest pain  Status post stent placement    Patient Care Team:  Deidra Olea APRN as PCP - General  Virgie Rios DPM as PCP - Claims Attributed  Talia Hickey MD as Consulting Physician (Cardiology)  Joann Aparicio APRN as Nurse Practitioner (Family Medicine)    Subjective .  Feeling better     ROS  Since I have last seen, the patient has been without any chest discomfort ,shortness of breath, palpitations, dizziness or syncope.  Denies having any headache ,abdominal pain ,nausea, vomiting , diarrhea constipation, loss of weight or loss of appetite.  Denies having any excessive bruising ,hematuria or blood in the stool.    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   • Biliary dyskinesia 6/17/2019   • Coronary artery disease    • Diverticulosis 8/7/2018   • Gastric reflux 6/17/2019   • Hemorrhoids 6/17/2019   • Hyperlipidemia 6/17/2019   • Hypertension 6/17/2019       Past Surgical History:   Procedure Laterality Date   • CORONARY ANGIOPLASTY WITH STENT PLACEMENT  06/21/2010   • HERNIA REPAIR         Family History   Problem Relation Age of Onset   • Heart failure Mother    • Heart failure Father        Social History     Tobacco Use   • Smoking status: Former Smoker   • Smokeless tobacco: Never Used   Substance Use Topics   • Alcohol use: No     Frequency: Never   • Drug use: No        Medications Prior to Admission   Medication Sig Dispense Refill Last Dose   • aspirin 325 MG tablet Take 325 mg by mouth every night at bedtime.   6/16/2019 at 20:00   • calcium carbonate-vitamin d (CALCIUM 600+D) 600-400 MG-UNIT per tablet Take  by mouth 2 (Two) Times a Day.   6/17/2019 at 08:00   • clopidogrel (PLAVIX) 75 MG tablet Take 75 mg by mouth Daily.   6/17/2019 at 08:00   • coenzyme Q10 100 MG capsule Take 100 mg by mouth Daily.   " 6/17/2019 at 08:00   • isosorbide mononitrate (IMDUR) 60 MG 24 hr tablet Take 60 mg by mouth Daily.   6/17/2019 at 08:00   • magnesium oxide (MAGOX) 400 (241.3 Mg) MG tablet tablet Take 400 mg by mouth every night at bedtime.   6/16/2019 at 20:00   • nitroglycerin (NITROSTAT) 0.4 MG SL tablet NITROSTAT 0.4 MG SUBL      • nitroglycerin (NITROSTAT) 0.4 MG SL tablet Place 0.4 mg under the tongue Every 5 (Five) Minutes As Needed for Chest Pain. Take no more than 3 doses in 15 minutes.      • omeprazole (priLOSEC) 40 MG capsule Take 40 mg by mouth Daily.      • Potassium 99 MG tablet Take 1 tablet by mouth Daily.   6/17/2019 at 08:00   • simvastatin (ZOCOR) 20 MG tablet Take 20 mg by mouth Daily.   6/17/2019 at 08:00       Allergies  Morphine    Scheduled Meds:  GI cocktail  Oral Once   aspirin 325 mg Oral Daily   atorvastatin 10 mg Oral Daily   clopidogrel 75 mg Oral Daily   enoxaparin 40 mg Subcutaneous Q24H   magnesium oxide 400 mg Oral Nightly   pantoprazole 40 mg Oral QAM   sodium chloride 3 mL Intravenous Q12H     Continuous Infusions:  nitroglycerin 10-50 mcg/min Last Rate: 5 mcg/min (06/17/19 1233)     PRN Meds:.•  acetaminophen  •  aluminum-magnesium hydroxide-simethicone  •  melatonin  •  nitroglycerin  •  ondansetron **OR** ondansetron  •  [COMPLETED] Insert peripheral IV **AND** sodium chloride  •  sodium chloride    Objective     VITAL SIGNS  Vitals:    06/18/19 1500 06/18/19 1845 06/18/19 2300 06/19/19 0600   BP: 138/82 131/80 125/65 128/74   BP Location: Right arm Right arm     Patient Position: Sitting Sitting Lying    Pulse:  55 55 52   Resp: 17 17 15 16   Temp: 97.7 °F (36.5 °C) 97.9 °F (36.6 °C) 97.9 °F (36.6 °C) 97.9 °F (36.6 °C)   TempSrc: Oral Oral Oral Oral   SpO2: 96% 97% 99% 96%   Weight:       Height:           Flowsheet Rows      First Filed Value   Admission Height  172.7 cm (68\") Documented at 06/17/2019 1057   Admission Weight  86 kg (189 lb 9.5 oz) Documented at 06/17/2019 1057         "   TELEMETRY: Sinus rhythm    Physical Exam:  The patient is alert, oriented and in no distress.  Vital signs as noted above.  Head and neck revealed no carotid bruits or jugular venous distention.  No thyromegaly or lymphadenopathy is present  Lungs clear.  No wheezing.  Breath sounds are normal bilaterally.  Heart normal first and second heart sounds.  No murmur. No precordial rub is present.  No gallop is present.  Abdomen soft and nontender.  No organomegaly is present.  Extremities with good peripheral pulses without any pedal edema.  Skin warm and dry.  Musculoskeletal system is grossly normal  CNS grossly normal      Results Review:   I reviewed the patient's new clinical results.  Lab Results (last 24 hours)     Procedure Component Value Units Date/Time    CBC & Differential [[id number]] Collected:  06/19/19 0311    Specimen:  Blood Updated:  06/19/19 0532    Narrative:       The following orders were created for panel order CBC & Differential.  Procedure                               Abnormality         Status                     ---------                               -----------         ------                     Scan Slide[[id number]]                                                                  CBC Auto Differential[[id number]]        Abnormal            Final result                 Please view results for these tests on the individual orders.    CBC Auto Differential [[id number]]  (Abnormal) Collected:  06/19/19 0311    Specimen:  Blood Updated:  06/19/19 0532     WBC 6.30 10*3/mm3      RBC 5.61 10*6/mm3      Hemoglobin 12.5 g/dL      Hematocrit 39.7 %      MCV 70.8 fL      MCH 22.2 pg      MCHC 31.4 g/dL      RDW 16.8 %      RDW-SD 42.0 fl      MPV 10.3 fL      Platelets 187 10*3/mm3      Neutrophil % 58.3 %      Lymphocyte % 26.5 %      Monocyte % 13.0 %      Eosinophil % 1.7 %      Basophil % 0.5 %      Neutrophils, Absolute 3.70 10*3/mm3      Lymphocytes, Absolute 1.70 10*3/mm3      Monocytes,  Absolute 0.80 10*3/mm3      Eosinophils, Absolute 0.10 10*3/mm3      Basophils, Absolute 0.00 10*3/mm3      nRBC 0.0 /100 WBC     Narrative:       Appended report. These results have been appended to a previously verified report.    Basic Metabolic Panel [[id number]]  (Abnormal) Collected:  06/19/19 0311    Specimen:  Blood Updated:  06/19/19 0416     Glucose 107 mg/dL      BUN 14 mg/dL      Creatinine 1.00 mg/dL      Sodium 137 mmol/L      Potassium 3.7 mmol/L      Chloride 104 mmol/L      CO2 26.0 mmol/L      Calcium 8.9 mg/dL      eGFR Non African Amer 74 mL/min/1.73      BUN/Creatinine Ratio 14.0     Anion Gap 7.0 mmol/L     Magnesium [[id number]]  (Normal) Collected:  06/19/19 0311    Specimen:  Blood Updated:  06/19/19 0416     Magnesium 2.0 mg/dL           Imaging Results (last 24 hours)     ** No results found for the last 24 hours. **          EKG      I personally viewed and interpreted the patient's EKG/Telemetry data:    ECHOCARDIOGRAM:    Results for orders placed during the hospital encounter of 06/17/19   Adult Transthoracic Echo Complete W/ Cont if Necessary Per Protocol    Narrative Technically satisfactory study.  Mitral annular calcification is present.  Minimal mitral regurgitation is present.  Tricuspid valve is normal.  Aortic valve is thickened with decreased opening motion.  Gradient across   aortic valve is 15 mmHg.  No evidence for aortic regurgitation is present.  Left atrium is normal in size.  Left ventricle is normal in size and contractility with ejection fraction   of 65%.  No pericardial effusion or intracardiac thrombus is seen.  No evidence for aortic or tricuspid regurgitation is present.    Impression    Mild aortic valve stenosis.    Left ventricle size and contractility is normal with ejection fraction of   65%.    Mitral annular calcification is present.           STRESS MYOVIEW:    Cardiolite (Tc-99m Sestamibi) stress test    CARDIAC CATHETERIZATION:    PROCEDURES; CARDIAC  CATHETERIZATION:1996        OTHER:         Assessment/Plan   [[[[[[[[[[[[[[[[[[[[[  Impression  ==============  -Chest pain possible angina pectoris.  EKG showed no acute changes.  Troponin levels are normal.    -status post stent to diagonal branch 06/21/2010 .    Stress Cardiolite test 06/15/2017 revealed reproducible chest discomfort 1.5 mm of inferior and anterolateral ST depression and inferior and apical moderate to significant ischemia.    Cardiac catheterization 06/21/2017 revealed normal left ventricular function diagonal branch stent was patent.  No significant disease was present. Please note RCA was engaged using 3D RC catheter.    - chest discomfort Extreme weakness and tiredness -Improved since lisinopril  was discontinued.     -hypertension and dyslipidemia    -recently diagnosed Solis's esophagitis     -strong family history of coronary artery disease     -former smoker     -allergy to penicillin.  ================    Plan  ============  EKG showed sinus bradycardia without any ischemic changes  Chest pain suggestive of angina pectoris.  Stress Cardiolite test.-Negative for myocardial ischemia  Echocardiogram-mild aortic valve stenosis.  Normal left ventricle function and mitral annular calcification   medications were reviewed and updated.  Okay with discharge plans.  Follow-up in office in 2 weeks  Further plan will depend on patient's progress.  Have discussed with patient's family and attending nurse for coordination of care.    [[[[[[[[[[[[[[[[[[[[[[[[[[        Principal Problem:    Precordial pain  Active Problems:    Hypertension    Hyperlipidemia    Gastric reflux    CAD (coronary artery disease)    Solis esophagus              Talia Hickey MD  06/19/19  9:44 AM

## 2019-06-20 ENCOUNTER — READMISSION MANAGEMENT (OUTPATIENT)
Dept: CALL CENTER | Facility: HOSPITAL | Age: 69
End: 2019-06-20

## 2019-06-20 NOTE — OUTREACH NOTE
Prep Survey      Responses   Facility patient discharged from?  [facility]   Is patient eligible?  Yes   Discharge diagnosis  Acute chest pain, ACS ruled out, s/p stress test   Does the patient have one of the following disease processes/diagnoses(primary or secondary)?  Other   Does the patient have Home health ordered?  No   Is there a DME ordered?  No   Prep survey completed?  Yes          Radha House RN

## 2019-06-21 ENCOUNTER — READMISSION MANAGEMENT (OUTPATIENT)
Dept: CALL CENTER | Facility: HOSPITAL | Age: 69
End: 2019-06-21

## 2019-06-21 LAB
BH CV NUCLEAR PRIOR STUDY: 3
STRESS POST EXERCISE DUR MIN: 5 MIN
STRESS POST EXERCISE DUR SEC: 49 SEC

## 2019-06-21 PROCEDURE — 78452 HT MUSCLE IMAGE SPECT MULT: CPT | Performed by: INTERNAL MEDICINE

## 2019-06-21 PROCEDURE — 93018 CV STRESS TEST I&R ONLY: CPT | Performed by: INTERNAL MEDICINE

## 2019-06-21 NOTE — PAYOR COMM NOTE
" THIS IS AN OBSERVATION AUTH. REQUEST. PATIENT ARRIVED TO OUR FACILITY ON 6/17/2019 AND WAS DISCHARGED ON 6/19/2019. REQUESTING  OBS AUTH. FOR STAY.            Leticia Saez RN  Utilization Review          [facility]   [address] [place]  [address]    [phone] Office  [phone] Fax  [email or web address]         Cain Evaristo SHANDA (68 y.o. Male)     Date of Birth Social Security Number Address Home Phone MRN    [id number]  [address] [place] [address] [phone] [id number]    [employer]ab[employer] Marital Status          Unknown        Admission Date Admission Type Admitting Provider Attending Provider Department, Room/Bed    6/17/19 Emergency Remigio Cohen DO  [facility] OBSERVATION, 108/1    Discharge Date Discharge Disposition Discharge Destination        6/19/2019 Home or Self Care              Attending Provider:  (none)   Allergies:  Morphine    Isolation:  None   Infection:  None   Code Status:  Prior    Ht:  172.7 cm (68\")   Wt:  83.6 kg (184 lb 4.9 oz)    Admission Cmt:  None   Principal Problem:  Precordial pain [R07.2]                 Active Insurance as of 6/17/2019     Primary Coverage     Payor Plan Insurance Group Employer/Plan Group    MEDICARE MEDICARE A & B      Payor Plan Address Payor Plan Phone Number Payor Plan Fax Number Effective Dates    [address] [phone]  11/1/2013 - None Entered    [facility] [id number]       Subscriber Name Subscriber Birth Date Member ID       EVARISTO VALENCIA 1950 [id number]           Secondary Coverage     Payor Plan Insurance Group Employer/Plan Group    [employer] CIGNA MEDICARE SUPPLEMENT SOLUTIONS      Payor Plan Address Payor Plan Phone Number Payor Plan Fax Number Effective Dates    [address] [address]   1/1/2017 - None Entered    [facility] [id number]       Subscriber Name Subscriber Birth Date Member ID       EVARISTO VALENCIA 1950 [id number]                 Emergency " Contacts      (Rel.) Home Phone Work Phone Mobile Phone    JYOTHI VALENCIA (Spouse) -- -- [phone]               History & Physical      Mackenzie Aragon APRN at 6/17/2019  4:10 PM     Attestation signed by Remigio Cohen DO at 6/18/2019  7:50 AM      I have reviewed the mid-level provider documentation, agree with the documentation, medical decision making and treatment plan as outlined by the mid-level provider.                      [facility] HOSPITALIST     PCP:  Deidra Olea APRN   Cardiology:  Dr. Hickey      CHIEF COMPLAINT:     Chest pain       HISTORY OF PRESENT ILLNESS:    This is a 68-year-old  male with a history of CAD, PCI, HTN, HLD, GERD, and Solis's esophagitis who presented to the ED on 06/17/2019 with complaint of chest pain. The patient states he had some dull heaviness in the center of his chest this morning. He states it became more severe, but did not radiate. He also reports some epigastric discomfort. He denies shortness of breath, diaphoresis, dizziness, palpitations, nausea, or vomiting. He states he took 1 sublingual nitroglycerin at home prior to arrival, but did not get any relief. He denies any exacerbating or alleviating factors. He is unsure when his last stress test was done, but states he saw his cardiologist in March 2019.     Review of records:  -6/15/17 stress Cardiolite revealed reproducible chest discomfort 1.5 mm of inferior and anterolateral ST depression and inferior and apical moderate to significant ischemia.   -6/21/17 LHC revealed normal left ventricular function diagonal branch stent was patent.  No significant disease was present. Please note RCA was engaged using 3D RC catheter.      Upon arrival to the ER the patient was started on Tridil drip and given aspirin 325 mg PO. His troponin was negative. His EKG showed no acute ischemia. His CXR and labs were unremarkable. He will be admitted for observation and further  evaluation.         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   • Biliary dyskinesia 6/17/2019   • Coronary artery disease    • Diverticulosis 8/7/2018   • Gastric reflux 6/17/2019   • Hemorrhoids 6/17/2019   • Hyperlipidemia 6/17/2019   • Hypertension 6/17/2019     Past Surgical History:   Procedure Laterality Date   • CORONARY ANGIOPLASTY WITH STENT PLACEMENT  06/21/2010   • HERNIA REPAIR       Family History   Problem Relation Age of Onset   • Heart failure Mother    • Heart failure Father      Social History     Tobacco Use   • Smoking status: Former Smoker   • Smokeless tobacco: Never Used   Substance Use Topics   • Alcohol use: No     Frequency: Never   • Drug use: No         Prior to Admission medications    Medication Sig Start Date End Date Taking? Authorizing Provider   aspirin 325 MG tablet Take 325 mg by mouth every night at bedtime.   Yes Hyacinth Arteaga MD   calcium carbonate-vitamin d (CALCIUM 600+D) 600-400 MG-UNIT per tablet Take  by mouth 2 (Two) Times a Day.   Yes Hyacinth Arteaga MD   clopidogrel (PLAVIX) 75 MG tablet Take 75 mg by mouth Daily. 6/5/18  Yes Hyacinth Arteaga MD   coenzyme Q10 100 MG capsule Take 100 mg by mouth Daily.   Yes Hyacinth Arteaga MD   isosorbide mononitrate (IMDUR) 60 MG 24 hr tablet Take 60 mg by mouth Daily.   Yes Hyacinth Arteaga MD   magnesium oxide (MAGOX) 400 (241.3 Mg) MG tablet tablet Take 400 mg by mouth every night at bedtime.   Yes Hyacinth Arteaga MD   nitroglycerin (NITROSTAT) 0.4 MG SL tablet NITROSTAT 0.4 MG SUBL 5/14/18  Yes Hyacinth Arteaga MD   nitroglycerin (NITROSTAT) 0.4 MG SL tablet Place 0.4 mg under the tongue Every 5 (Five) Minutes As Needed for Chest Pain. Take no more than 3 doses in 15 minutes.   Yes Hyacinth Arteaga MD   omeprazole (priLOSEC) 40 MG capsule Take 40 mg by mouth Daily.   Yes Hyacinth Arteaga MD   Potassium 99 MG tablet Take 1 tablet  "by mouth Daily.   Yes Hyacinth Arteaga MD   simvastatin (ZOCOR) 20 MG tablet Take 20 mg by mouth Daily.   Yes Hyacinth Arteaga MD   aspirin 325 MG tablet ASPIRIN 325 MG TABS 7/2/15 6/17/19 Yes Hyacinth Arteaga MD   Calcium Carb-Cholecalciferol (CALCIUM 1000 + D) 1000-800 MG-UNIT tablet CALCIUM + D 600-200 MG-UNIT ORAL TABS (CALCIUM CARB-CHOLECALCIFEROL) 6/15/17 6/17/19 Yes Hyacinth Arteaga MD   isosorbide mononitrate (IMDUR) 30 MG 24 hr tablet Take 60 mg by mouth Daily. 5/14/18 6/17/19 Yes Hyacinth Arteaga MD   Magnesium Oxide -Mg Supplement 400 MG capsule MAGNESIUM 400 MG CAPS 6/15/17 6/17/19 Yes Hyacinth Arteaga MD   Potassium 99 MG tablet POTASSIUM 99 MG TABS 6/15/17 6/17/19 Yes Hyacinth Arteaga MD   simvastatin (ZOCOR) 20 MG tablet SIMVASTATIN 20 MG TABS 11/5/13 6/17/19 Yes Hyacinth Arteaga MD       Allergies:  Morphine      There is no immunization history on file for this patient.        REVIEW OF SYSTEMS:  Please see the above history of present illness for pertinent positives and negatives.  The remainder of the patient's systems have been reviewed and are negative.       Vital Signs  Temp:  [97.8 °F (36.6 °C)] 97.8 °F (36.6 °C)  Heart Rate:  [57-64] 59  Resp:  [18] 18  BP: ()/(27-89) 118/64    Flowsheet Rows      First Filed Value   Admission Height  172.7 cm (68\") Documented at 06/17/2019 1057   Admission Weight  86 kg (189 lb 9.5 oz) Documented at 06/17/2019 1057           Physical Exam:  Physical Exam   Constitutional: Patient appears well-developed and well-nourished and in no acute distress     HEENT:   Head: Normocephalic and atraumatic.   Eyes:  Pupils are equal, round, and reactive to light. EOM are intact. Sclera are anicteric and non-injected.  Mouth and Throat: Patient has moist mucous membranes. Oropharynx is clear of any erythema or exudate.       Neck: Neck supple.  No thyromegaly present. No lymphadenopathy present. No  masses.     Cardiovascular: " Inspection: No JVD present. Palpation: No parasternal heave. Pedal pulses +3 bilaterally. No leg edema. Auscultation: Regular rate, regular rhythm, S1 normal and S2 normal. reveals no gallop and no friction rub. No Carotid bruit bilaterally.    Pulmonary/Chest: Inspection: No distress, no use of accessory muscles. Lungs are clear to auscultation bilaterally. No respiratory distress. No wheezes. No rhonchi. No rales.     Abdomen /Gastrointestinal: Inspection: no distension. Palpation: no masses, no organomegaly. Soft. There is no tenderness. Bowel sounds are normal.     Musculoskeletal: Normal Muscle tone. Age appropriate, no deformities.    Neurological: Patient is alert and oriented to person, place, and time. Cranial nerves II-XII are grossly intact with no focal deficits. Sensori-motor exam is normal. No cerebellar signs.    Skin: Skin is warm. No rash noted. Nails show no clubbing.  No cyanosis or erythema. No bruising.      Results Review:    I reviewed the patient's new clinical results.  Lab Results (most recent)     Procedure Component Value Units Date/Time    CBC & Differential [[id number]] Collected:  06/17/19 1203    Specimen:  Blood Updated:  06/17/19 1410    Narrative:       The following orders were created for panel order CBC & Differential.  Procedure                               Abnormality         Status                     ---------                               -----------         ------                     Scan Slide[[id number]]                                                                  CBC Auto Differential[[id number]]        Abnormal            Final result                 Please view results for these tests on the individual orders.    CBC Auto Differential [[id number]]  (Abnormal) Collected:  06/17/19 1203    Specimen:  Blood Updated:  06/17/19 1410     WBC 6.40 10*3/mm3      RBC 5.42 10*6/mm3      Hemoglobin 12.0 g/dL      Hematocrit 38.5 %      MCV 71.0 fL      MCH 22.2 pg      MCHC  31.3 g/dL      RDW 16.5 %      RDW-SD 41.1 fl      MPV 10.4 fL      Platelets 199 10*3/mm3      Neutrophil % 63.8 %      Lymphocyte % 25.6 %      Monocyte % 9.3 %      Eosinophil % 0.7 %      Basophil % 0.6 %      Neutrophils, Absolute 4.10 10*3/mm3      Lymphocytes, Absolute 1.60 10*3/mm3      Monocytes, Absolute 0.60 10*3/mm3      Eosinophils, Absolute 0.00 10*3/mm3      Basophils, Absolute 0.00 10*3/mm3      nRBC 0.0 /100 WBC     Protime-INR [[id number]]  (Normal) Collected:  06/17/19 1203    Specimen:  Blood Updated:  06/17/19 1340     Protime 10.7 Seconds      INR 1.04    BNP [[id number]]  (Normal) Collected:  06/17/19 1203    Specimen:  Blood Updated:  06/17/19 1322     BNP 30.0 pg/mL      Comment: Results may be falsely decreased if patient taking Biotin.       [place] Draw [[id number]] Collected:  06/17/19 1203    Specimen:  Blood Updated:  06/17/19 1315    Narrative:       The following orders were created for panel order [place] Draw.  Procedure                               Abnormality         Status                     ---------                               -----------         ------                     Light Blue Top[[id number]]                                   Final result               Green Top (Gel)[[id number]]                                  Final result               Lavender Top[[id number]]                                     Final result               Gold Top - SST[[id number]]                                   Final result                 Please view results for these tests on the individual orders.    Gold Top - SST [[id number]] Collected:  06/17/19 1203    Specimen:  Blood Updated:  06/17/19 1315     Extra Tube Hold for add-ons.     Comment: Auto resulted.       Light Blue Top [[id number]] Collected:  06/17/19 1203    Specimen:  Blood Updated:  06/17/19 1315     Extra Tube hold for add-on     Comment: Auto resulted       Green Top (Gel) [[id number]] Collected:  06/17/19 1203    Specimen:  Blood  Updated:  06/17/19 1315     Extra Tube Hold for add-ons.     Comment: Auto resulted.       Lavloly Top [[id number]] Collected:  06/17/19 1203    Specimen:  Blood Updated:  06/17/19 1315     Extra Tube hold for add-on     Comment: Auto resulted       Comprehensive Metabolic Panel [[id number]]  (Abnormal) Collected:  06/17/19 1203    Specimen:  Blood Updated:  06/17/19 1240     Glucose 105 mg/dL      BUN 11 mg/dL      Creatinine 0.70 mg/dL      Sodium 140 mmol/L      Potassium 4.1 mmol/L      Chloride 105 mmol/L      CO2 22.0 mmol/L      Calcium 9.4 mg/dL      Total Protein 6.0 g/dL      Albumin 3.90 g/dL      ALT (SGPT) 15 U/L      AST (SGOT) 18 U/L      Alkaline Phosphatase 51 U/L      Total Bilirubin 1.5 mg/dL      eGFR Non African Amer 111 mL/min/1.73      Globulin 2.1 gm/dL      A/G Ratio 1.9 g/dL      BUN/Creatinine Ratio 15.7     Anion Gap 13.0 mmol/L     Troponin [[id number]]  (Normal) Collected:  06/17/19 1203    Specimen:  Blood Updated:  06/17/19 1234     Troponin I <0.030 ng/mL     Narrative:       Troponin I Reference Range:    0.00-0.03  Negative.  Repeat testing in 4-6 hours if clinically indicated.    0.04-0.29  Suspicious for myocardial injury. Serial measurements and clinical  correlation may be necessary to confirm or exclude diagnosis of acute  coronary syndrome.  Repeat testing in 4-6 hours if indicated.     >0.29 Consistent with myocardial injury.  Recommend clinical and laboratory correlation.     Results my be falsely decreased if patient taking Biotin.           Imaging Results (most recent)     Procedure Component Value Units Date/Time    XR Chest 1 View [[id number]] Collected:  06/17/19 1305     Updated:  06/17/19 1305    Narrative:       DATE OF EXAM:  6/17/2019 12:53 PM     PROCEDURE:  XR CHEST 1 VW-     INDICATIONS:  Chest pain and shortness of breath     COMPARISON:  No Comparisons Available     TECHNIQUE:   Single radiographic view of the chest was obtained.     FINDINGS:  The heart size  and pulmonary vascular markings are normal. The lungs are  clear. The osseous structures are normal.        Impression:       No active disease     Electronically Signed By-Pelon Beal On:6/17/2019 1:05 PM  This report was finalized on [id number] by  Pelon Beal, .            ECG/EMG Results (most recent)     None                Assessment/Plan       Precordial pain    CAD (coronary artery disease)    Hypertension    Hyperlipidemia    Gastric reflux    Solis esophagus      Acute chest pain  - r/o ACS   - serial troponin  - stress Myoview in am  - continuous cardiac monitoring  - continue Tridil gtt; wean off as tolerated  - GI cocktail x 1    CAD, h/o stent to diagonal branch (06/21/2010)  -6/15/17 stress Cardiolite revealed reproducible chest discomfort 1.5 mm of inferior and anterolateral ST depression and inferior and apical moderate to significant ischemia.   -6/21/17 LHC revealed normal left ventricular function diagonal branch stent was patent.  No significant disease was present. Please note RCA was engaged using 3D RC catheter.  - continue aspirin, Plavix, and statin  - hold Imdur while on Tridil gtt     Essential HTN, chronic, controlled  - patient states he was taken off antihypertensives after he lost weight  - monitor BP    HLD  - continue statin    GERD / History of Solis's esophagitis  - continue PPI     Former smoker    VTE prophylaxis - Lovenox       Electronically signed by MICHELLE Richey, 06/17/19, 4:31 PM.    Electronically signed by Remigio Cohen DO at 6/18/2019  7:50 AM

## 2019-06-21 NOTE — OUTREACH NOTE
Medical Week 1 Survey      Responses   Facility patient discharged from?  [facility]   Does the patient have one of the following disease processes/diagnoses(primary or secondary)?  Other   Is there a successful TCM telephone encounter documented?  No   Week 1 attempt successful?  Yes   Call start time  0858   Call end time  0859 [PATIENT EAGER TO END CALL]   Discharge diagnosis  Acute chest pain, ACS ruled out, s/p stress test   Medication alerts for this patient  NO MEDICATION CHANGES   Meds reviewed with patient/caregiver?  N/A [NO CHANGES IN MEDS DISCUSSED AND PT AWARE OF NO CHANGES]   Does the patient have all medications ordered at discharge?  N/A   Does the patient have a primary care provider?   Yes   Does the patient have an appointment with their PCP within 7 days of discharge?  No   What is preventing the patient from scheduling follow up appointments within 7 days of discharge?  -- [PATIENT KEEPING NEXT SCHEDULED APPT]   Has the patient kept scheduled appointments due by today?  N/A   Did the patient receive a copy of their discharge instructions?  Yes   Nursing interventions  Reviewed instructions with patient   What is the patient's perception of their health status since discharge?  Improving   Additional teach back comments  PT VERY EAGER TO END CALL   Week 1 call completed?  Yes   Graduated  Yes          Mary Hester LPN

## 2019-06-21 NOTE — PAYOR COMM NOTE
" DC SUMMARY BELOW.   OBS AUTH PENDING. THANK YOU.      Leticia Saez, RN  Utilization Review          [facility]   [address] [place]  [address]    [phone] Office  [phone] Fax  [email or web address]   [facility]/SebastianEvaristo Forte (68 y.o. Male)     Date of Birth Social Security Number Address Home Phone MRN    1950  [address] [place] [address] [phone] [id number]    Muslim Marital Status          Unknown        Admission Date Admission Type Admitting Provider Attending Provider Department, Room/Bed    6/17/19 Emergency Remigio Cohen DO  [facility] OBSERVATION, 108/1    Discharge Date Discharge Disposition Discharge Destination        6/19/2019 Home or Self Care              Attending Provider:  (none)   Allergies:  Morphine    Isolation:  None   Infection:  None   Code Status:  Prior    Ht:  172.7 cm (68\")   Wt:  83.6 kg (184 lb 4.9 oz)    Admission Cmt:  None   Principal Problem:  Precordial pain [R07.2]                 Active Insurance as of 6/17/2019     Primary Coverage     Payor Plan Insurance Group Employer/Plan Group    MEDICARE MEDICARE A & B      Payor Plan Address Payor Plan Phone Number Payor Plan Fax Number Effective Dates    [address] [phone]  11/1/2013 - None Entered    [facility] [id number]       Subscriber Name Subscriber Birth Date Member ID       EVARISTO VALENCIA 1950 [id number]           Secondary Coverage     Payor Plan Insurance Group Employer/Plan Group    [employer] CIGNA MEDICARE SUPPLEMENT SOLUTIONS      Payor Plan Address Payor Plan Phone Number Payor Plan Fax Number Effective Dates    [address] [address]   1/1/2017 - None Entered    [place] [address]       Subscriber Name Subscriber Birth Date Member ID       EVARISTO VALENCIA 1950 [id number]                 Emergency Contacts      (Rel.) Home Phone Work Phone Mobile Phone    JYOTHI VALENCIA (Spouse) -- -- " [phone]               Discharge Summary      Remigio Cohen DO at 6/19/2019 11:45 AM            Date of Admission: 6/17/2019    Date of Discharge:  6/19/2019    Length of stay:  LOS: 0 days     Admission Diagnosis: Chest Pain    Discharge Diagnosis:     Acute chest pain, ACS ruled out   - serial troponin negative   - stress Myoview -  negative for reversible ischemia   - symptoms resolved  - f/u with Dr. Hickey in 2 weeks      CAD, h/o stent to diagonal branch (06/21/2010)  -6/15/17 stress Cardiolite revealed reproducible chest discomfort 1.5 mm of inferior and anterolateral ST depression and inferior and apical moderate to significant ischemia.   -6/21/17 LHC revealed normal left ventricular function diagonal branch stent was patent.  No significant disease was present. Please note RCA was engaged using 3D RC catheter.  - continue aspirin, Plavix, and statin, imdur      Essential HTN, chronic, controlled  - patient states he was taken off antihypertensives after he lost weight     HLD  - continue statin     GERD / History of Solis's esophagitis  - continue PPI      Former smoker          Hospital Course:  Patient is a 68 y.o. male who presented to the ED on 06/17/2019 with complaint of chest pain. The patient states he had some dull heaviness in the center of his chest this morning. He states it became more severe, but did not radiate. He also reports some epigastric discomfort. He denies shortness of breath, diaphoresis, dizziness, palpitations, nausea, or vomiting. He states he took 1 sublingual nitroglycerin at home prior to arrival, but did not get any relief. He denies any exacerbating or alleviating factors. He is unsure when his last stress test was done, but states he saw his cardiologist in March 2019.      Upon arrival to the ER the patient was started on Tridil drip and given aspirin 325 mg PO. His troponin was negative. His EKG showed no acute ischemia. His CXR and labs were unremarkable. He  will be admitted for observation and further evaluation.  Patient was evaluated by Dr. Hickey, his cardiologist, and underwent cardiac stress test that was negative for reversible ischemia. Patient had resolution of his symptoms and will be discharged home in stable condition. Patient will be continued on his home meds and is encouraged to return to the ED if symptoms return and to follow up with pcp and cardiology.            Procedures Performed:         Consults:   Consults     Date and Time Order Name Status Description    6/17/2019 1511 Hospitalist (on-call MD unless specified) Completed           Vital Signs:  Temp:  [97.9 °F (36.6 °C)] 97.9 °F (36.6 °C)  Heart Rate:  [52-55] 52  Resp:  [15-16] 16  BP: (125-128)/(65-74) 128/74      Physical Exam:  Physical Exam   Constitutional: He appears well-developed and well-nourished.   Cardiovascular: Normal rate and regular rhythm.   Pulmonary/Chest: Effort normal and breath sounds normal.   Abdominal: Soft. Bowel sounds are normal.             Pertinent Test Results:     Lab Results (last 72 hours)     Procedure Component Value Units Date/Time    CBC & Differential [[id number]] Collected:  06/19/19 0311    Specimen:  Blood Updated:  06/19/19 0532    Narrative:       The following orders were created for panel order CBC & Differential.  Procedure                               Abnormality         Status                     ---------                               -----------         ------                     Scan Slide[[id number]]                                                                  CBC Auto Differential[[id number]]        Abnormal            Final result                 Please view results for these tests on the individual orders.    CBC Auto Differential [[id number]]  (Abnormal) Collected:  06/19/19 0311    Specimen:  Blood Updated:  06/19/19 0532     WBC 6.30 10*3/mm3      RBC 5.61 10*6/mm3      Hemoglobin 12.5 g/dL      Hematocrit 39.7 %      MCV 70.8 fL       MCH 22.2 pg      MCHC 31.4 g/dL      RDW 16.8 %      RDW-SD 42.0 fl      MPV 10.3 fL      Platelets 187 10*3/mm3      Neutrophil % 58.3 %      Lymphocyte % 26.5 %      Monocyte % 13.0 %      Eosinophil % 1.7 %      Basophil % 0.5 %      Neutrophils, Absolute 3.70 10*3/mm3      Lymphocytes, Absolute 1.70 10*3/mm3      Monocytes, Absolute 0.80 10*3/mm3      Eosinophils, Absolute 0.10 10*3/mm3      Basophils, Absolute 0.00 10*3/mm3      nRBC 0.0 /100 WBC     Narrative:       Appended report. These results have been appended to a previously verified report.    Basic Metabolic Panel [[id number]]  (Abnormal) Collected:  06/19/19 0311    Specimen:  Blood Updated:  06/19/19 0416     Glucose 107 mg/dL      BUN 14 mg/dL      Creatinine 1.00 mg/dL      Sodium 137 mmol/L      Potassium 3.7 mmol/L      Chloride 104 mmol/L      CO2 26.0 mmol/L      Calcium 8.9 mg/dL      eGFR Non African Amer 74 mL/min/1.73      BUN/Creatinine Ratio 14.0     Anion Gap 7.0 mmol/L     Magnesium [[id number]]  (Normal) Collected:  06/19/19 0311    Specimen:  Blood Updated:  06/19/19 0416     Magnesium 2.0 mg/dL     Troponin [[id number]]  (Normal) Collected:  06/18/19 0601    Specimen:  Blood Updated:  06/18/19 0700     Troponin I <0.030 ng/mL     Narrative:       Troponin I Reference Range:    0.00-0.03  Negative.  Repeat testing in 4-6 hours if clinically indicated.    0.04-0.29  Suspicious for myocardial injury. Serial measurements and clinical  correlation may be necessary to confirm or exclude diagnosis of acute  coronary syndrome.  Repeat testing in 4-6 hours if indicated.     >0.29 Consistent with myocardial injury.  Recommend clinical and laboratory correlation.     Results my be falsely decreased if patient taking Biotin.     Troponin [[id number]]  (Normal) Collected:  06/17/19 2329    Specimen:  Blood Updated:  06/18/19 0054     Troponin I <0.030 ng/mL     Narrative:       Troponin I Reference Range:    0.00-0.03  Negative.  Repeat testing in  4-6 hours if clinically indicated.    0.04-0.29  Suspicious for myocardial injury. Serial measurements and clinical  correlation may be necessary to confirm or exclude diagnosis of acute  coronary syndrome.  Repeat testing in 4-6 hours if indicated.     >0.29 Consistent with myocardial injury.  Recommend clinical and laboratory correlation.     Results my be falsely decreased if patient taking Biotin.     CBC & Differential [[id number]] Collected:  06/17/19 1203    Specimen:  Blood Updated:  06/17/19 1410    Narrative:       The following orders were created for panel order CBC & Differential.  Procedure                               Abnormality         Status                     ---------                               -----------         ------                     Scan Slide[[id number]]                                                                  CBC Auto Differential[[id number]]        Abnormal            Final result                 Please view results for these tests on the individual orders.    CBC Auto Differential [[id number]]  (Abnormal) Collected:  06/17/19 1203    Specimen:  Blood Updated:  06/17/19 1410     WBC 6.40 10*3/mm3      RBC 5.42 10*6/mm3      Hemoglobin 12.0 g/dL      Hematocrit 38.5 %      MCV 71.0 fL      MCH 22.2 pg      MCHC 31.3 g/dL      RDW 16.5 %      RDW-SD 41.1 fl      MPV 10.4 fL      Platelets 199 10*3/mm3      Neutrophil % 63.8 %      Lymphocyte % 25.6 %      Monocyte % 9.3 %      Eosinophil % 0.7 %      Basophil % 0.6 %      Neutrophils, Absolute 4.10 10*3/mm3      Lymphocytes, Absolute 1.60 10*3/mm3      Monocytes, Absolute 0.60 10*3/mm3      Eosinophils, Absolute 0.00 10*3/mm3      Basophils, Absolute 0.00 10*3/mm3      nRBC 0.0 /100 WBC     Protime-INR [[id number]]  (Normal) Collected:  06/17/19 1203    Specimen:  Blood Updated:  06/17/19 1340     Protime 10.7 Seconds      INR 1.04    BNP [[id number]]  (Normal) Collected:  06/17/19 1203    Specimen:  Blood Updated:  06/17/19 1322      BNP 30.0 pg/mL      Comment: Results may be falsely decreased if patient taking Biotin.       [place] Draw [[id number]] Collected:  06/17/19 1203    Specimen:  Blood Updated:  06/17/19 1315    Narrative:       The following orders were created for panel order [place] Draw.  Procedure                               Abnormality         Status                     ---------                               -----------         ------                     Light Blue Top[[id number]]                                   Final result               Green Top (Gel)[[id number]]                                  Final result               Lavender Top[[id number]]                                     Final result               Gold Top - SST[[id number]]                                   Final result                 Please view results for these tests on the individual orders.    Gold Top - SST [[id number]] Collected:  06/17/19 1203    Specimen:  Blood Updated:  06/17/19 1315     Extra Tube Hold for add-ons.     Comment: Auto resulted.       Light Blue Top [[id number]] Collected:  06/17/19 1203    Specimen:  Blood Updated:  06/17/19 1315     Extra Tube hold for add-on     Comment: Auto resulted       Green Top (Gel) [[id number]] Collected:  06/17/19 1203    Specimen:  Blood Updated:  06/17/19 1315     Extra Tube Hold for add-ons.     Comment: Auto resulted.       Lavender Top [[id number]] Collected:  06/17/19 1203    Specimen:  Blood Updated:  06/17/19 1315     Extra Tube hold for add-on     Comment: Auto resulted       Comprehensive Metabolic Panel [[id number]]  (Abnormal) Collected:  06/17/19 1203    Specimen:  Blood Updated:  06/17/19 1240     Glucose 105 mg/dL      BUN 11 mg/dL      Creatinine 0.70 mg/dL      Sodium 140 mmol/L      Potassium 4.1 mmol/L      Chloride 105 mmol/L      CO2 22.0 mmol/L      Calcium 9.4 mg/dL      Total Protein 6.0 g/dL      Albumin 3.90 g/dL      ALT (SGPT) 15 U/L      AST (SGOT) 18 U/L      Alkaline Phosphatase  51 U/L      Total Bilirubin 1.5 mg/dL      eGFR Non African Amer 111 mL/min/1.73      Globulin 2.1 gm/dL      A/G Ratio 1.9 g/dL      BUN/Creatinine Ratio 15.7     Anion Gap 13.0 mmol/L     Troponin [[id number]]  (Normal) Collected:  06/17/19 1203    Specimen:  Blood Updated:  06/17/19 1234     Troponin I <0.030 ng/mL     Narrative:       Troponin I Reference Range:    0.00-0.03  Negative.  Repeat testing in 4-6 hours if clinically indicated.    0.04-0.29  Suspicious for myocardial injury. Serial measurements and clinical  correlation may be necessary to confirm or exclude diagnosis of acute  coronary syndrome.  Repeat testing in 4-6 hours if indicated.     >0.29 Consistent with myocardial injury.  Recommend clinical and laboratory correlation.     Results my be falsely decreased if patient taking Biotin.             Results for orders placed during the hospital encounter of 06/17/19   Adult Transthoracic Echo Complete W/ Cont if Necessary Per Protocol    Narrative Technically satisfactory study.  Mitral annular calcification is present.  Minimal mitral regurgitation is present.  Tricuspid valve is normal.  Aortic valve is thickened with decreased opening motion.  Gradient across   aortic valve is 15 mmHg.  No evidence for aortic regurgitation is present.  Left atrium is normal in size.  Left ventricle is normal in size and contractility with ejection fraction   of 65%.  No pericardial effusion or intracardiac thrombus is seen.  No evidence for aortic or tricuspid regurgitation is present.    Impression    Mild aortic valve stenosis.    Left ventricle size and contractility is normal with ejection fraction of   65%.    Mitral annular calcification is present.       Imaging Results (all)     Procedure Component Value Units Date/Time    XR Chest 1 View [[id number]] Collected:  06/17/19 1305     Updated:  06/17/19 1305    Narrative:       DATE OF EXAM:  6/17/2019 12:53 PM     PROCEDURE:  XR CHEST 1 VW-      INDICATIONS:  Chest pain and shortness of breath     COMPARISON:  No Comparisons Available     TECHNIQUE:   Single radiographic view of the chest was obtained.     FINDINGS:  The heart size and pulmonary vascular markings are normal. The lungs are  clear. The osseous structures are normal.        Impression:       No active disease     Electronically Signed By-Pelon Beal On:6/17/2019 1:05 PM  This report was finalized on [id number] by  Pelon Beal, .            Condition on Discharge: stable    Discharge Disposition:  Home or Self Care    Discharge Medications:     Discharge Medications      Continue These Medications      Instructions Start Date   aspirin 325 MG tablet   325 mg, Oral, Every Night at Bedtime      CALCIUM 600+D 600-400 MG-UNIT per tablet  Generic drug:  calcium carbonate-vitamin d   Oral, 2 Times Daily      coenzyme Q10 100 MG capsule   100 mg, Oral, Daily      isosorbide mononitrate 60 MG 24 hr tablet  Commonly known as:  IMDUR   60 mg, Oral, Daily      magnesium oxide 400 (241.3 Mg) MG tablet tablet  Commonly known as:  MAGOX   400 mg, Oral, Every Night at Bedtime      nitroglycerin 0.4 MG SL tablet  Commonly known as:  NITROSTAT   0.4 mg, Sublingual, Every 5 Minutes PRN, Take no more than 3 doses in 15 minutes.       NITROSTAT 0.4 MG SL tablet  Generic drug:  nitroglycerin   NITROSTAT 0.4 MG SUBL      omeprazole 40 MG capsule  Commonly known as:  priLOSEC   40 mg, Oral, Daily      PLAVIX 75 MG tablet  Generic drug:  clopidogrel   75 mg, Oral, Daily      Potassium 99 MG tablet   1 tablet, Oral, Daily      simvastatin 20 MG tablet  Commonly known as:  ZOCOR   20 mg, Oral, Daily             Discharge Diet:   Diet Instructions     Diet: Cardiac      Discharge Diet:  Cardiac          Activity at Discharge:   Activity Instructions     Activity as Tolerated            Follow-up Appointments:  Future Appointments   Date Time Provider Department Center   7/10/2019  3:40 PM Talia Hickey MD [facility]   9/17/2019  9:30 AM Talia Hickey MD [facility]   10/14/2019  9:30 AM Deidra Olea APRN [facility] None   11/5/2019  3:00 PM Seipel, Joseph F, MD [facility] None         Test Results Pending at Discharge:  None    Risk for Readmission (LACE): Score: 2 (6/19/2019  6:00 AM)          Remigio Cohen DO  06/19/19  7:59 PM            Electronically signed by Remigio Cohen DO at 6/19/2019  8:04 PM

## 2019-07-10 ENCOUNTER — OFFICE VISIT (OUTPATIENT)
Dept: CARDIOLOGY | Facility: CLINIC | Age: 69
End: 2019-07-10

## 2019-07-10 VITALS
HEIGHT: 68 IN | HEART RATE: 62 BPM | DIASTOLIC BLOOD PRESSURE: 73 MMHG | SYSTOLIC BLOOD PRESSURE: 128 MMHG | OXYGEN SATURATION: 98 % | BODY MASS INDEX: 28.95 KG/M2 | WEIGHT: 191 LBS

## 2019-07-10 DIAGNOSIS — I25.10 CORONARY ARTERY DISEASE INVOLVING NATIVE CORONARY ARTERY OF NATIVE HEART WITHOUT ANGINA PECTORIS: Primary | Chronic | ICD-10-CM

## 2019-07-10 DIAGNOSIS — Z95.5 S/P PRIMARY ANGIOPLASTY WITH CORONARY STENT: Chronic | ICD-10-CM

## 2019-07-10 DIAGNOSIS — E78.2 MIXED HYPERLIPIDEMIA: Chronic | ICD-10-CM

## 2019-07-10 PROCEDURE — 99213 OFFICE O/P EST LOW 20 MIN: CPT | Performed by: INTERNAL MEDICINE

## 2019-07-10 NOTE — PROGRESS NOTES
Encounter Date:07/10/2019  Last seen 3/7/2019      Patient ID: Evaristo Barlow is a 68 y.o. male.    Chief Complaint:  Follow-up stent placement  History of chest discomfort  Tiredness.      History of Present Illness  Patient is having tiredness.  Patient recently was admitted to [facility] with chest discomfort and was found to have a normal echocardiogram and stress Cardiolite test.  Patient was released home.    Since I have last seen, the patient has been without any chest discomfort ,shortness of breath, palpitations, dizziness or syncope.  Denies having any headache ,abdominal pain ,nausea, vomiting , diarrhea constipation, loss of weight or loss of appetite.  Denies having any excessive bruising ,hematuria or blood in the stool.    Review of all systems negative except as indicated    Assessment and Plan       [[[[[[[[[[[[[[[[[[[[[  Impression  ==============   -status post stent to diagonal branch 06/21/2010 .  Negative stress Cardiolite test and normal echocardiogram 6/18/2019    Cardiac catheterization 06/21/2017 revealed normal left ventricular function diagonal branch stent was patent.  No significant disease was present. Please note RCA was engaged using 3D RC catheter.    - chest discomfort Extreme weakness and tiredness -Improved since lisinopril  was discontinued.     -hypertension and dyslipidemia    -recently diagnosed Solis's esophagitis     -strong family history of coronary artery disease     -former smoker     -allergy to penicillin.  ================    Plan  ============  Recent echocardiogram and stress Cardiolite test were reviewed with patient and family.  Patient is not having any angina pectoris or congestive heart failure    patient is off lisinopril  Continue Plavix and Imdur 60 mg a day and simvastatin 20 mg a day   blood pressure is better 128/73 medications were reviewed and updated.  Followup in the office at her regularly scheduled appointment in September  9019  [[[[[[[[[[[[[[[[[[[[[[[[[[               Diagnosis Plan   1. Coronary artery disease involving native coronary artery of native heart without angina pectoris     2. Mixed hyperlipidemia     3. S/P primary angioplasty with coronary stent     LAB RESULTS (LAST 7 DAYS)    CBC        BMP        CMP         BNP        TROPONIN        CoAg        Creatinine Clearance  Estimated Creatinine Clearance: 73.6 mL/min (by C-G formula based on SCr of 1 mg/dL).    ABG        Radiology  No radiology results for the last day                The following portions of the patient's history were reviewed and updated as appropriate: allergies, current medications, past family history, past medical history, past social history, past surgical history and problem list.    Review of Systems   Constitution: Positive for malaise/fatigue.   Cardiovascular: Negative for chest pain, leg swelling, palpitations and syncope.   Respiratory: Negative for shortness of breath.    Skin: Negative for rash.   Gastrointestinal: Negative for nausea and vomiting.   Neurological: Positive for light-headedness. Negative for dizziness and numbness.         Current Outpatient Medications:   •  aspirin 325 MG tablet, Take 325 mg by mouth every night at bedtime., Disp: , Rfl:   •  calcium carbonate-vitamin d (CALCIUM 600+D) 600-400 MG-UNIT per tablet, Take  by mouth 2 (Two) Times a Day., Disp: , Rfl:   •  clopidogrel (PLAVIX) 75 MG tablet, Take 75 mg by mouth Daily., Disp: , Rfl:   •  coenzyme Q10 100 MG capsule, Take 100 mg by mouth Daily., Disp: , Rfl:   •  isosorbide mononitrate (IMDUR) 60 MG 24 hr tablet, Take 60 mg by mouth Daily., Disp: , Rfl:   •  magnesium oxide (MAGOX) 400 (241.3 Mg) MG tablet tablet, Take 400 mg by mouth every night at bedtime., Disp: , Rfl:   •  nitroglycerin (NITROSTAT) 0.4 MG SL tablet, NITROSTAT 0.4 MG SUBL, Disp: , Rfl:   •  nitroglycerin (NITROSTAT) 0.4 MG SL tablet, Place 0.4 mg under the tongue Every 5 (Five) Minutes As  "Needed for Chest Pain. Take no more than 3 doses in 15 minutes., Disp: , Rfl:   •  omeprazole (priLOSEC) 40 MG capsule, Take 40 mg by mouth Daily., Disp: , Rfl:   •  Potassium 99 MG tablet, Take 1 tablet by mouth Daily., Disp: , Rfl:   •  simvastatin (ZOCOR) 20 MG tablet, Take 20 mg by mouth Daily., Disp: , Rfl:     Allergies   Allergen Reactions   • Morphine Anaphylaxis       Family History   Problem Relation Age of Onset   • Heart failure Mother    • Heart failure Father        Past Surgical History:   Procedure Laterality Date   • CORONARY ANGIOPLASTY WITH STENT PLACEMENT  06/21/2010   • HERNIA REPAIR         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   • Biliary dyskinesia 6/17/2019   • Coronary artery disease    • Diverticulosis 8/7/2018   • Gastric reflux 6/17/2019   • Hemorrhoids 6/17/2019   • Hyperlipidemia 6/17/2019   • Hypertension 6/17/2019       Family History   Problem Relation Age of Onset   • Heart failure Mother    • Heart failure Father        Social History     Socioeconomic History   • Marital status:      Spouse name: Not on file   • Number of children: Not on file   • Years of education: Not on file   • Highest education level: Not on file   Tobacco Use   • Smoking status: Former Smoker   • Smokeless tobacco: Never Used   Substance and Sexual Activity   • Alcohol use: No     Frequency: Never   • Drug use: No   • Sexual activity: Defer         Procedures      Objective:       Physical Exam    /73 (BP Location: Right arm, Patient Position: Sitting, Cuff Size: Adult)   Pulse 62   Ht 172.7 cm (68\")   Wt 86.6 kg (191 lb)   SpO2 98%   BMI 29.04 kg/m²   The patient is alert, oriented and in no distress.    Vital signs as noted above.    Head and neck revealed no carotid bruits or jugular venous distension.  No thyromegaly or lymphadenopathy is present.    Lungs clear.  No wheezing.  Breath sounds are normal bilaterally.    Heart " normal first and second heart sounds.  No murmur..  No pericardial rub is present.  No gallop is present.    Abdomen soft and nontender.  No organomegaly is present.    Extremities revealed good peripheral pulses without any pedal edema.    Skin warm and dry.    Musculoskeletal system is grossly normal.    CNS grossly normal.

## 2019-07-12 RX ORDER — ISOSORBIDE MONONITRATE 60 MG/1
TABLET, EXTENDED RELEASE ORAL
Qty: 90 TABLET | Refills: 2 | Status: SHIPPED | OUTPATIENT
Start: 2019-07-12 | End: 2020-04-29

## 2019-09-11 PROBLEM — R42 DIZZINESS: Status: ACTIVE | Noted: 2018-01-30

## 2019-09-11 PROBLEM — Z82.49 FAMILY HISTORY OF CORONARY ARTERY DISEASE: Status: ACTIVE | Noted: 2019-09-11

## 2019-09-11 PROBLEM — R94.39 ABNORMAL CARDIOVASCULAR STRESS TEST: Status: ACTIVE | Noted: 2017-06-15

## 2019-09-11 RX ORDER — DONEPEZIL HYDROCHLORIDE 5 MG/1
5 TABLET, FILM COATED ORAL DAILY
Refills: 11 | COMMUNITY
Start: 2019-06-06 | End: 2019-10-14

## 2019-09-17 ENCOUNTER — OFFICE VISIT (OUTPATIENT)
Dept: CARDIOLOGY | Facility: CLINIC | Age: 69
End: 2019-09-17

## 2019-09-17 VITALS
HEIGHT: 68 IN | WEIGHT: 200 LBS | HEART RATE: 61 BPM | SYSTOLIC BLOOD PRESSURE: 137 MMHG | OXYGEN SATURATION: 97 % | BODY MASS INDEX: 30.31 KG/M2 | DIASTOLIC BLOOD PRESSURE: 80 MMHG

## 2019-09-17 DIAGNOSIS — E78.2 MIXED HYPERLIPIDEMIA: Chronic | ICD-10-CM

## 2019-09-17 DIAGNOSIS — I25.10 CORONARY ARTERY DISEASE INVOLVING NATIVE CORONARY ARTERY OF NATIVE HEART WITHOUT ANGINA PECTORIS: Chronic | ICD-10-CM

## 2019-09-17 DIAGNOSIS — Z95.5 S/P PRIMARY ANGIOPLASTY WITH CORONARY STENT: Chronic | ICD-10-CM

## 2019-09-17 DIAGNOSIS — I10 ESSENTIAL HYPERTENSION: Primary | Chronic | ICD-10-CM

## 2019-09-17 PROCEDURE — 93000 ELECTROCARDIOGRAM COMPLETE: CPT | Performed by: INTERNAL MEDICINE

## 2019-09-17 PROCEDURE — 99213 OFFICE O/P EST LOW 20 MIN: CPT | Performed by: INTERNAL MEDICINE

## 2019-09-17 NOTE — PROGRESS NOTES
Encounter Date:09/17/2019  Last seen 3/7/2019      Patient ID: Evaristo Barlow is a 68 y.o. male.    Chief Complaint:  Status post stent  Hypertension  Dyslipidemia    History of Present Illness  Since I have last seen, the patient has been without any chest discomfort ,shortness of breath, palpitations, dizziness or syncope.  Denies having any headache ,abdominal pain ,nausea, vomiting , diarrhea constipation, loss of weight or loss of appetite.  Denies having any excessive bruising ,hematuria or blood in the stool.    Review of all systems negative except as indicated    Assessment and Plan       [[[[[[[[[[[[[[[[[[[[[  Impression  ==============   -status post stent to diagonal branch 06/21/2010 .    Stress Cardiolite test 06/15/2017 revealed reproducible chest discomfort 1.5 mm of inferior and anterolateral ST depression and inferior and apical moderate to significant ischemia.    Cardiac catheterization 06/21/2017 revealed normal left ventricular function diagonal branch stent was patent.  No significant disease was present. Please note RCA was engaged using 3D RC catheter.    - chest discomfort Extreme weakness and tiredness -Improved since lisinopril  was discontinued.     -hypertension and dyslipidemia    -recently diagnosed Solis's esophagitis     -strong family history of coronary artery disease     -former smoker     -allergy to penicillin.  ================    Plan  ============  EKG showed sinus bradycardia without any ischemic changes  Patient is not having any angina pectoris or congestive heart failure    patient is off lisinopril  Continue Plavix and Imdur 60 mg a day and simvastatin 20 mg a day   blood pressure is better 137/80  Medications were reviewed and updated.  Patient recently has moved to [place].  Followup in the office in  6 months  Further plan will depend on patient's progress.  [[[[[[[[[[[[[[[[[[[[[[[[[[         Diagnosis Plan   1. Essential hypertension  ECG 12 Lead   2. Mixed  hyperlipidemia  ECG 12 Lead   3. Coronary artery disease involving native coronary artery of native heart without angina pectoris  ECG 12 Lead   4. S/P primary angioplasty with coronary stent  ECG 12 Lead   LAB RESULTS (LAST 7 DAYS)    CBC        BMP        CMP         BNP        TROPONIN        CoAg        Creatinine Clearance  CrCl cannot be calculated (Patient's most recent lab result is older than the maximum 30 days allowed.).    ABG        Radiology  No radiology results for the last day                The following portions of the patient's history were reviewed and updated as appropriate: allergies, current medications, past family history, past medical history, past social history, past surgical history and problem list.    Review of Systems   Constitution: Negative for malaise/fatigue.   Cardiovascular: Negative for chest pain, leg swelling, palpitations and syncope.   Respiratory: Negative for shortness of breath.    Skin: Negative for rash.   Gastrointestinal: Negative for nausea and vomiting.   Neurological: Negative for dizziness, light-headedness and numbness.         Current Outpatient Medications:   •  aspirin 325 MG tablet, Take 325 mg by mouth every night at bedtime., Disp: , Rfl:   •  calcium carbonate-vitamin d (CALCIUM 600+D) 600-400 MG-UNIT per tablet, Take  by mouth 2 (Two) Times a Day., Disp: , Rfl:   •  clopidogrel (PLAVIX) 75 MG tablet, Take 75 mg by mouth Daily., Disp: , Rfl:   •  coenzyme Q10 100 MG capsule, Take 100 mg by mouth Daily., Disp: , Rfl:   •  donepezil (ARICEPT) 5 MG tablet, Take 5 mg by mouth Daily., Disp: , Rfl: 11  •  isosorbide mononitrate (IMDUR) 60 MG 24 hr tablet, TAKE 1 TABLET BY MOUTH ONCE DAILY IN THE MORNING, Disp: 90 tablet, Rfl: 2  •  magnesium oxide (MAGOX) 400 (241.3 Mg) MG tablet tablet, Take 400 mg by mouth every night at bedtime., Disp: , Rfl:   •  nitroglycerin (NITROSTAT) 0.4 MG SL tablet, NITROSTAT 0.4 MG SUBL, Disp: , Rfl:   •  nitroglycerin (NITROSTAT)  0.4 MG SL tablet, Place 0.4 mg under the tongue Every 5 (Five) Minutes As Needed for Chest Pain. Take no more than 3 doses in 15 minutes., Disp: , Rfl:   •  omeprazole (priLOSEC) 40 MG capsule, Take 40 mg by mouth Daily., Disp: , Rfl:   •  Potassium 99 MG tablet, Take 1 tablet by mouth Daily., Disp: , Rfl:   •  simvastatin (ZOCOR) 20 MG tablet, Take 20 mg by mouth Daily., Disp: , Rfl:     Allergies   Allergen Reactions   • Morphine Anaphylaxis       Family History   Problem Relation Age of Onset   • Heart failure Mother    • Heart failure Father        Past Surgical History:   Procedure Laterality Date   • CORONARY ANGIOPLASTY WITH STENT PLACEMENT  06/21/2010   • HERNIA REPAIR         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   • Biliary dyskinesia 6/17/2019   • Coronary artery disease    • Diverticulosis 8/7/2018   • Gastric reflux 6/17/2019   • Hemorrhoids 6/17/2019   • Hyperlipidemia 6/17/2019   • Hypertension 6/17/2019       Family History   Problem Relation Age of Onset   • Heart failure Mother    • Heart failure Father        Social History     Socioeconomic History   • Marital status:      Spouse name: Not on file   • Number of children: Not on file   • Years of education: Not on file   • Highest education level: Not on file   Tobacco Use   • Smoking status: Former Smoker   • Smokeless tobacco: Never Used   Substance and Sexual Activity   • Alcohol use: No     Frequency: Never   • Drug use: No   • Sexual activity: Defer           ECG 12 Lead  Date/Time: 9/17/2019 9:59 AM  Performed by: Talia Hickey MD  Authorized by: Talia Hickey MD   Comparison: compared with previous ECG   Comments: Sinus bradycardia otherwise normal ECG 59/min normal axis normal intervals no ectopy.  No change from 3/7/2019              Objective:       Physical Exam    /80 (BP Location: Right arm, Patient Position: Sitting, Cuff Size: Large Adult)   Pulse 61   Ht  "172.7 cm (68\")   Wt 90.7 kg (200 lb)   SpO2 97%   BMI 30.41 kg/m²   The patient is alert, oriented and in no distress.    Vital signs as noted above.    Head and neck revealed no carotid bruits or jugular venous distension.  No thyromegaly or lymphadenopathy is present.    Lungs clear.  No wheezing.  Breath sounds are normal bilaterally.    Heart normal first and second heart sounds.  No murmur..  No pericardial rub is present.  No gallop is present.    Abdomen soft and nontender.  No organomegaly is present.    Extremities revealed good peripheral pulses without any pedal edema.    Skin warm and dry.    Musculoskeletal system is grossly normal.    CNS grossly normal.        "

## 2019-10-14 ENCOUNTER — LAB (OUTPATIENT)
Dept: FAMILY MEDICINE CLINIC | Facility: CLINIC | Age: 69
End: 2019-10-14

## 2019-10-14 ENCOUNTER — OFFICE VISIT (OUTPATIENT)
Dept: FAMILY MEDICINE CLINIC | Facility: CLINIC | Age: 69
End: 2019-10-14

## 2019-10-14 VITALS
DIASTOLIC BLOOD PRESSURE: 84 MMHG | WEIGHT: 197.6 LBS | SYSTOLIC BLOOD PRESSURE: 130 MMHG | TEMPERATURE: 98 F | BODY MASS INDEX: 29.95 KG/M2 | OXYGEN SATURATION: 99 % | RESPIRATION RATE: 16 BRPM | HEIGHT: 68 IN | HEART RATE: 65 BPM

## 2019-10-14 DIAGNOSIS — Z00.00 MEDICARE ANNUAL WELLNESS VISIT, SUBSEQUENT: Primary | ICD-10-CM

## 2019-10-14 DIAGNOSIS — Z12.5 PROSTATE CANCER SCREENING: ICD-10-CM

## 2019-10-14 DIAGNOSIS — Z00.00 MEDICARE ANNUAL WELLNESS VISIT, SUBSEQUENT: ICD-10-CM

## 2019-10-14 DIAGNOSIS — M72.2 PLANTAR FASCIITIS: ICD-10-CM

## 2019-10-14 DIAGNOSIS — J30.89 ALLERGIC RHINITIS DUE TO OTHER ALLERGIC TRIGGER, UNSPECIFIED SEASONALITY: ICD-10-CM

## 2019-10-14 LAB
ALBUMIN SERPL-MCNC: 4.1 G/DL (ref 3.5–4.8)
ALBUMIN/GLOB SERPL: 2 G/DL (ref 1–1.7)
ALP SERPL-CCNC: 51 U/L (ref 32–91)
ALT SERPL W P-5'-P-CCNC: 14 U/L (ref 17–63)
ANION GAP SERPL CALCULATED.3IONS-SCNC: 12.5 MMOL/L (ref 5–15)
ARTICHOKE IGE QN: 100 MG/DL (ref 0–100)
AST SERPL-CCNC: 18 U/L (ref 15–41)
BASOPHILS # BLD AUTO: 0.1 10*3/MM3 (ref 0–0.2)
BASOPHILS NFR BLD AUTO: 0.7 % (ref 0–1.5)
BILIRUB SERPL-MCNC: 1.3 MG/DL (ref 0.3–1.2)
BUN BLD-MCNC: 13 MG/DL (ref 8–20)
BUN/CREAT SERPL: 16.3 (ref 6.2–20.3)
CALCIUM SPEC-SCNC: 9.9 MG/DL (ref 8.9–10.3)
CHLORIDE SERPL-SCNC: 104 MMOL/L (ref 101–111)
CHOLEST SERPL-MCNC: 139 MG/DL
CO2 SERPL-SCNC: 29 MMOL/L (ref 22–32)
CREAT BLD-MCNC: 0.8 MG/DL (ref 0.7–1.2)
DEPRECATED RDW RBC AUTO: 45.5 FL (ref 37–54)
EOSINOPHIL # BLD AUTO: 0.2 10*3/MM3 (ref 0–0.4)
EOSINOPHIL NFR BLD AUTO: 1.9 % (ref 0.3–6.2)
ERYTHROCYTE [DISTWIDTH] IN BLOOD BY AUTOMATED COUNT: 18.3 % (ref 12.3–15.4)
GFR SERPL CREATININE-BSD FRML MDRD: 96 ML/MIN/1.73
GLOBULIN UR ELPH-MCNC: 2.1 GM/DL (ref 2.5–3.8)
GLUCOSE BLD-MCNC: 104 MG/DL (ref 65–99)
HCT VFR BLD AUTO: 38.9 % (ref 37.5–51)
HDLC SERPL QL: 4.09
HDLC SERPL-MCNC: 34 MG/DL
HGB BLD-MCNC: 12 G/DL (ref 13–17.7)
LDLC/HDLC SERPL: 2.72 {RATIO}
LYMPHOCYTES # BLD AUTO: 2 10*3/MM3 (ref 0.7–3.1)
LYMPHOCYTES NFR BLD AUTO: 24 % (ref 19.6–45.3)
MCH RBC QN AUTO: 21.4 PG (ref 26.6–33)
MCHC RBC AUTO-ENTMCNC: 30.7 G/DL (ref 31.5–35.7)
MCV RBC AUTO: 69.6 FL (ref 79–97)
MONOCYTES # BLD AUTO: 0.7 10*3/MM3 (ref 0.1–0.9)
MONOCYTES NFR BLD AUTO: 8.9 % (ref 5–12)
NEUTROPHILS # BLD AUTO: 5.3 10*3/MM3 (ref 1.7–7)
NEUTROPHILS NFR BLD AUTO: 64.5 % (ref 42.7–76)
NRBC BLD AUTO-RTO: 0 /100 WBC (ref 0–0.2)
PLATELET # BLD AUTO: 205 10*3/MM3 (ref 140–450)
PMV BLD AUTO: 10.7 FL (ref 6–12)
POTASSIUM BLD-SCNC: 4.5 MMOL/L (ref 3.6–5.1)
PROT SERPL-MCNC: 6.2 G/DL (ref 6.1–7.9)
PSA SERPL-MCNC: 0.88 NG/ML (ref 0–4)
RBC # BLD AUTO: 5.59 10*6/MM3 (ref 4.14–5.8)
SODIUM BLD-SCNC: 141 MMOL/L (ref 136–144)
TRIGL SERPL-MCNC: 63 MG/DL
TSH SERPL DL<=0.05 MIU/L-ACNC: 2.22 UIU/ML (ref 0.34–5.6)
VLDLC SERPL-MCNC: 12.6 MG/DL
WBC NRBC COR # BLD: 8.2 10*3/MM3 (ref 3.4–10.8)

## 2019-10-14 PROCEDURE — 84443 ASSAY THYROID STIM HORMONE: CPT | Performed by: NURSE PRACTITIONER

## 2019-10-14 PROCEDURE — 85025 COMPLETE CBC W/AUTO DIFF WBC: CPT | Performed by: NURSE PRACTITIONER

## 2019-10-14 PROCEDURE — 99213 OFFICE O/P EST LOW 20 MIN: CPT | Performed by: NURSE PRACTITIONER

## 2019-10-14 PROCEDURE — G0103 PSA SCREENING: HCPCS | Performed by: NURSE PRACTITIONER

## 2019-10-14 PROCEDURE — G0439 PPPS, SUBSEQ VISIT: HCPCS | Performed by: NURSE PRACTITIONER

## 2019-10-14 PROCEDURE — 80053 COMPREHEN METABOLIC PANEL: CPT | Performed by: NURSE PRACTITIONER

## 2019-10-14 PROCEDURE — 80061 LIPID PANEL: CPT | Performed by: NURSE PRACTITIONER

## 2019-10-14 NOTE — PROGRESS NOTES
The ABCs of the Annual Wellness Visit  Subsequent Medicare Wellness Visit    Chief Complaint   Patient presents with   • Medicare Wellness-subsequent       Subjective   History of Present Illness:  Evaristo Barlow is a 68 y.o. male who presents for a Subsequent Medicare Wellness Visit.    Pt also with c/o ongoing right heel and foot pain. Worse in the mornings when he first gets up and after sitting for a while and then gets up. Has seen podiatry and was given insoles. Sometimes are worse than others. Not taking any NSAIDs. Did have rx for celebrex and has taken a couple of doses.     Also with c/o sinus pressure and congestion. Mostly drainage in his throat. Not taking anything for symptoms.     HEALTH RISK ASSESSMENT    Recent Hospitalizations:  Recently treated at the following:  [facility]     Current Medical Providers:  Patient Care Team:  Deidra Olea APRN as PCP - General  Talia Hickey MD as PCP - Claims Attributed  Talia Hickey MD as Consulting Physician (Cardiology)  Joann Aparicio APRN as Nurse Practitioner (Family Medicine)    Smoking Status:  Social History     Tobacco Use   Smoking Status Former Smoker   • Years: 20.00   • Types: Cigarettes   • Last attempt to quit:    • Years since quittin.8   Smokeless Tobacco Never Used       Alcohol Consumption:  Social History     Substance and Sexual Activity   Alcohol Use No   • Frequency: Never       Depression Screen:   PHQ-2/PHQ-9 Depression Screening 10/14/2019   Little interest or pleasure in doing things 0   Feeling down, depressed, or hopeless 0   Total Score 0       Fall Risk Screen:  HITESH Fall Risk Assessment was completed, and patient is at LOW risk for falls.Assessment completed on:10/14/2019    Health Habits and Functional and Cognitive Screening:  Functional & Cognitive Status 10/14/2019   Do you have difficulty preparing food and eating? No   Do you have difficulty bathing yourself, getting dressed or grooming  yourself? No   Do you have difficulty using the toilet? No   Do you have difficulty moving around from place to place? No   Do you have trouble with steps or getting out of a bed or a chair? Yes   Current Diet Well Balanced Diet   Dental Exam Not up to date   Eye Exam Not up to date   Exercise (times per week) 7 times per week   Current Exercise Activities Include Walking   Do you need help using the phone?  No   Are you deaf or do you have serious difficulty hearing?  Yes   Do you need help with transportation? No   Do you need help shopping? No   Do you need help preparing meals?  No   Do you need help with housework?  No   Do you need help with laundry? No   Do you need help taking your medications? No   Do you need help managing money? No   Do you ever drive or ride in a car without wearing a seat belt? No   Have you felt unusual stress, anger or loneliness in the last month? No   Who do you live with? Spouse   If you need help, do you have trouble finding someone available to you? No   Have you been bothered in the last four weeks by sexual problems? No   Do you have difficulty concentrating, remembering or making decisions? No         Does the patient have evidence of cognitive impairment? No    Asprin use counseling:Taking ASA appropriately as indicated    Age-appropriate Screening Schedule:  Refer to the list below for future screening recommendations based on patient's age, sex and/or medical conditions. Orders for these recommended tests are listed in the plan section. The patient has been provided with a written plan.    Health Maintenance   Topic Date Due   • LIPID PANEL  06/20/2019   • PNEUMOCOCCAL VACCINES (65+ LOW/MEDIUM RISK) (1 of 2 - PCV13) 10/14/2019 (Originally 11/4/2013)   • TDAP/TD VACCINES (1 - Tdap) 10/14/2019 (Originally 11/4/1967)   • ZOSTER VACCINE (1 of 2) 10/14/2019 (Originally 11/4/1998)   • INFLUENZA VACCINE  10/06/2020 (Originally 8/1/2019)   • COLONOSCOPY  08/07/2028          The  following portions of the patient's history were reviewed and updated as appropriate: allergies, current medications, past family history, past medical history, past social history, past surgical history and problem list.    Outpatient Medications Prior to Visit   Medication Sig Dispense Refill   • aspirin 325 MG tablet Take 325 mg by mouth every night at bedtime.     • calcium carbonate-vitamin d (CALCIUM 600+D) 600-400 MG-UNIT per tablet Take  by mouth 2 (Two) Times a Day.     • clopidogrel (PLAVIX) 75 MG tablet Take 75 mg by mouth Daily.     • coenzyme Q10 100 MG capsule Take 100 mg by mouth Daily.     • isosorbide mononitrate (IMDUR) 60 MG 24 hr tablet TAKE 1 TABLET BY MOUTH ONCE DAILY IN THE MORNING 90 tablet 2   • magnesium oxide (MAGOX) 400 (241.3 Mg) MG tablet tablet Take 400 mg by mouth every night at bedtime.     • nitroglycerin (NITROSTAT) 0.4 MG SL tablet NITROSTAT 0.4 MG SUBL     • omeprazole (priLOSEC) 40 MG capsule Take 40 mg by mouth Daily.     • Potassium 99 MG tablet Take 1 tablet by mouth Daily.     • simvastatin (ZOCOR) 20 MG tablet Take 20 mg by mouth Daily.     • nitroglycerin (NITROSTAT) 0.4 MG SL tablet Place 0.4 mg under the tongue Every 5 (Five) Minutes As Needed for Chest Pain. Take no more than 3 doses in 15 minutes.     • donepezil (ARICEPT) 5 MG tablet Take 5 mg by mouth Daily.  11     No facility-administered medications prior to visit.        Patient Active Problem List   Diagnosis   • Hypertension   • Arthritis   • S/P primary angioplasty with coronary stent   • Hyperlipidemia   • Gastric reflux   • CAD (coronary artery disease)   • Solis esophagus   • Diverticulosis   • Hemorrhoids   • Precordial pain   • Abnormal cardiovascular stress test   • Anemia   • Dizziness   • Dyslipidemia   • Elevated blood pressure reading without diagnosis of hypertension   • Family history of coronary artery disease   • Impaired glucose tolerance       Advanced Care Planning:  Patient does not have an  "advance directive - information provided to the patient today    Review of Systems   Constitutional: Negative for chills and fever.   HENT: Positive for congestion, postnasal drip and sore throat. Negative for ear pain, rhinorrhea and voice change.    Respiratory: Negative for cough and shortness of breath.    Cardiovascular: Negative for chest pain and leg swelling.   Gastrointestinal: Negative for constipation and diarrhea.   Genitourinary: Negative for difficulty urinating and dysuria.   Neurological: Negative for dizziness and headaches.   Psychiatric/Behavioral: Negative for confusion and sleep disturbance. The patient is not nervous/anxious.        Compared to one year ago, the patient feels his physical health is the same.  Compared to one year ago, the patient feels his mental health is better.    Reviewed chart for potential of high risk medication in the elderly: yes  Reviewed chart for potential of harmful drug interactions in the elderly:yes    Objective         Vitals:    10/14/19 0930   BP: 130/84   BP Location: Right arm   Patient Position: Sitting   Cuff Size: Adult   Pulse: 65   Resp: 16   Temp: 98 °F (36.7 °C)   TempSrc: Oral   SpO2: 99%   Weight: 89.6 kg (197 lb 9.6 oz)   Height: 172.7 cm (68\")       Body mass index is 30.04 kg/m².  Discussed the patient's BMI with him. The BMI is in the acceptable range.    Physical Exam   Constitutional: He is oriented to person, place, and time. He appears well-developed and well-nourished.   HENT:   Head: Normocephalic.   Eyes: Conjunctivae are normal. Pupils are equal, round, and reactive to light.   Neck: Normal range of motion. Neck supple. No thyromegaly present.   Cardiovascular: Normal rate and regular rhythm.   No murmur heard.  Pulmonary/Chest: Effort normal and breath sounds normal.   Abdominal: Soft. Bowel sounds are normal. There is no tenderness.   Musculoskeletal: Normal range of motion.   Neurological: He is alert and oriented to person, place, " and time.   Skin: Skin is warm and dry. No lesion noted.   Psychiatric: He has a normal mood and affect. His behavior is normal.       Lab Results   Component Value Date    TRIG 63 10/14/2019    HDL 34 (L) 10/14/2019     10/14/2019    VLDL 12.6 10/14/2019        Assessment/Plan   Medicare Risks and Personalized Health Plan  CMS Preventative Services Quick Reference  Advance Directive Discussion  Cardiovascular risk  Colon Cancer Screening  Dementia/Memory   Depression/Dysphoria  Fall Risk  Hearing Problem  Immunizations Discussed/Encouraged (specific immunizations; Influenza )  Polypharmacy  Prostate Cancer Screening     The above risks/problems have been discussed with the patient.  Pertinent information has been shared with the patient in the After Visit Summary.  Follow up plans and orders are seen below in the Assessment/Plan Section.    Diagnoses and all orders for this visit:    1. Medicare annual wellness visit, subsequent (Primary)  -     CBC & Differential; Future  -     Comprehensive Metabolic Panel; Future  -     Lipid Panel; Future  -     PSA Screen; Future  -     TSH; Future    2. Prostate cancer screening  -     PSA Screen; Future    3. Plantar fasciitis  Comments:  discussed ice, stretches, NSAIDs as needed. Information given.     4. Allergic rhinitis due to other allergic trigger, unspecified seasonality  Comments:  Start otc clairitin or zyrtec daily.     check labs  Pt declined immunizations  During this visit for their annual exam, we reviewed their personal history, social history and family history. We went over their medications and all the recommended health maintenance items for their age group. They were given the opportunity to ask questions and discuss other concerns.     Follow Up:  Return in about 6 months (around 4/14/2020).     An After Visit Summary and PPPS were given to the patient.

## 2019-10-14 NOTE — PATIENT INSTRUCTIONS
Medicare Wellness  Personal Prevention Plan of Service     Date of Office Visit:  10/14/2019  Encounter Provider:  MICHELLE Conklin  Place of Service:  [facility]  Patient Name: Evaristo Barlow  :  1950    As part of the Medicare Wellness portion of your visit today, we are providing you with this personalized preventive plan of services (PPPS). This plan is based upon recommendations of the [place] Preventive Services Task Force (USPSTF) and the Advisory Committee on Immunization Practices (ACIP).    This lists the preventive care services that should be considered, and provides dates of when you are due. Items listed as completed are up-to-date and do not require any further intervention.    Health Maintenance   Topic Date Due   • MEDICARE ANNUAL WELLNESS  2019   • LIPID PANEL  2019   • AAA SCREEN (ONE-TIME)  10/14/2019   • PNEUMOCOCCAL VACCINES (65+ LOW/MEDIUM RISK) (1 of 2 - PCV13) 10/14/2019 (Originally 2013)   • TDAP/TD VACCINES (1 - Tdap) 10/14/2019 (Originally 1967)   • ZOSTER VACCINE (1 of 2) 10/14/2019 (Originally 1998)   • INFLUENZA VACCINE  10/06/2020 (Originally 2019)   • COLONOSCOPY  2028   • HEPATITIS C SCREENING  Addressed       Orders Placed This Encounter   Procedures   • Comprehensive Metabolic Panel     Standing Status:   Future     Standing Expiration Date:   10/14/2020   • Lipid Panel     Standing Status:   Future     Standing Expiration Date:   10/14/2020   • PSA Screen     Standing Status:   Future     Standing Expiration Date:   10/14/2020   • TSH     Standing Status:   Future     Standing Expiration Date:   10/14/2020   • CBC & Differential     Standing Status:   Future     Standing Expiration Date:   10/14/2020     Order Specific Question:   Manual Differential     Answer:   No       No Follow-up on file.          Fall Prevention in the Home, Adult  Falls can cause injuries. They can happen to  people of all ages. There are many things you can do to make your home safe and to help prevent falls. Ask for help when making these changes, if needed.  What actions can I take to prevent falls?  General Instructions  · Use good lighting in all rooms. Replace any light bulbs that burn out.  · Turn on the lights when you go into a dark area. Use night-lights.  · Keep items that you use often in easy-to-reach places. Lower the shelves around your home if necessary.  · Set up your furniture so you have a clear path. Avoid moving your furniture around.  · Do not have throw rugs and other things on the floor that can make you trip.  · Avoid walking on wet floors.  · If any of your floors are uneven, fix them.  · Add color or contrast paint or tape to clearly piero and help you see:  ? Any grab bars or handrails.  ? First and last steps of stairways.  ? Where the edge of each step is.  · If you use a stepladder:  ? Make sure that it is fully opened. Do not climb a closed stepladder.  ? Make sure that both sides of the stepladder are locked into place.  ? Ask someone to hold the stepladder for you while you use it.  · If there are any pets around you, be aware of where they are.  What can I do in the bathroom?    · Keep the floor dry. Clean up any water that spills onto the floor as soon as it happens.  · Remove soap buildup in the tub or shower regularly.  · Use non-skid mats or decals on the floor of the tub or shower.  · Attach bath mats securely with double-sided, non-slip rug tape.  · If you need to sit down in the shower, use a plastic, non-slip stool.  · Install grab bars by the toilet and in the tub and shower. Do not use towel bars as grab bars.  What can I do in the bedroom?  · Make sure that you have a light by your bed that is easy to reach.  · Do not use any sheets or blankets that are too big for your bed. They should not hang down onto the floor.  · Have a firm chair that has side arms. You can use this for  support while you get dressed.  What can I do in the kitchen?  · Clean up any spills right away.  · If you need to reach something above you, use a strong step stool that has a grab bar.  · Keep electrical cords out of the way.  · Do not use floor polish or wax that makes floors slippery. If you must use wax, use non-skid floor wax.  What can I do with my stairs?  · Do not leave any items on the stairs.  · Make sure that you have a light switch at the top of the stairs and the bottom of the stairs. If you do not have them, ask someone to add them for you.  · Make sure that there are handrails on both sides of the stairs, and use them. Fix handrails that are broken or loose. Make sure that handrails are as long as the stairways.  · Install non-slip stair treads on all stairs in your home.  · Avoid having throw rugs at the top or bottom of the stairs. If you do have throw rugs, attach them to the floor with carpet tape.  · Choose a carpet that does not hide the edge of the steps on the stairway.  · Check any carpeting to make sure that it is firmly attached to the stairs. Fix any carpet that is loose or worn.  What can I do on the outside of my home?  · Use bright outdoor lighting.  · Regularly fix the edges of walkways and driveways and fix any cracks.  · Remove anything that might make you trip as you walk through a door, such as a raised step or threshold.  · Trim any bushes or trees on the path to your home.  · Regularly check to see if handrails are loose or broken. Make sure that both sides of any steps have handrails.  · Install guardrails along the edges of any raised decks and porches.  · Clear walking paths of anything that might make someone trip, such as tools or rocks.  · Have any leaves, snow, or ice cleared regularly.  · Use sand or salt on walking paths during winter.  · Clean up any spills in your garage right away. This includes grease or oil spills.  What other actions can I take?  · Wear shoes  that:  ? Have a low heel. Do not wear high heels.  ? Have rubber bottoms.  ? Are comfortable and fit you well.  ? Are closed at the toe. Do not wear open-toe sandals.  · Use tools that help you move around (mobility aids) if they are needed. These include:  ? Canes.  ? Walkers.  ? Scooters.  ? Crutches.  · Review your medicines with your doctor. Some medicines can make you feel dizzy. This can increase your chance of falling.  Ask your doctor what other things you can do to help prevent falls.  Where to find more information  · Centers for Disease Control and Prevention, STEAD[place]: https://cdc.gov  · National [place] on Aging: https://yo5ucfe.adam.nih.gov  Contact a doctor if:  · You are afraid of falling at home.  · You feel weak, drowsy, or dizzy at home.  · You fall at home.  Summary  · There are many simple things that you can do to make your home safe and to help prevent falls.  · Ways to make your home safe include removing tripping hazards and installing grab bars in the bathroom.  · Ask for help when making these changes in your home.  This information is not intended to replace advice given to you by your health care provider. Make sure you discuss any questions you have with your health care provider.  Document Released: 10/14/2010 Document Revised: 08/02/2018 Document Reviewed: 08/02/2018  [employer] Interactive Patient Education © 2019 [employer]      Advance Directive    Advance directives are legal documents that let you make choices ahead of time about your health care and medical treatment in case you become unable to communicate for yourself. Advance directives are a way for you to communicate your wishes to family, friends, and health care providers. This can help convey your decisions about end-of-life care if you become unable to communicate.  Discussing and writing advance directives should happen over time rather than all at once. Advance directives can be changed depending on your situation and  what you want, even after you have signed the advance directives.  If you do not have an advance directive, some states assign family decision makers to act on your behalf based on how closely you are related to them. Each state has its own laws regarding advance directives. You may want to check with your health care provider, , or state representative about the laws in your state. There are different types of advance directives, such as:  · Medical power of .  · Living will.  · Do not resuscitate (DNR) or do not attempt resuscitation (DNAR) order.  Health care proxy and medical power of   A health care proxy, also called a health care agent, is a person who is appointed to make medical decisions for you in cases in which you are unable to make the decisions yourself. Generally, people choose someone they know well and trust to represent their preferences. Make sure to ask this person for an agreement to act as your proxy. A proxy may have to exercise judgment in the event of a medical decision for which your wishes are not known.  A medical power of  is a legal document that names your health care proxy. Depending on the laws in your state, after the document is written, it may also need to be:  · Signed.  · Notarized.  · Dated.  · Copied.  · Witnessed.  · Incorporated into your medical record.  You may also want to appoint someone to manage your financial affairs in a situation in which you are unable to do so. This is called a durable power of  for finances. It is a separate legal document from the durable power of  for health care. You may choose the same person or someone different from your health care proxy to act as your agent in financial matters.  If you do not appoint a proxy, or if there is a concern that the proxy is not acting in your best interests, a court-appointed guardian may be designated to act on your behalf.  Living will  A living will is a set  of instructions documenting your wishes about medical care when you cannot express them yourself. Health care providers should keep a copy of your living will in your medical record. You may want to give a copy to family members or friends. To alert caregivers in case of an emergency, you can place a card in your wallet to let them know that you have a living will and where they can find it. A living will is used if you become:  · Terminally ill.  · Incapacitated.  · Unable to communicate or make decisions.  Items to consider in your living will include:  · The use or non-use of life-sustaining equipment, such as dialysis machines and breathing machines (ventilators).  · A DNR or DNAR order, which is the instruction not to use cardiopulmonary resuscitation (CPR) if breathing or heartbeat stops.  · The use or non-use of tube feeding.  · Withholding of food and fluids.  · Comfort (palliative) care when the goal becomes comfort rather than a cure.  · Organ and tissue donation.  A living will does not give instructions for distributing your money and property if you should pass away. It is recommended that you seek the advice of a  when writing a will. Decisions about taxes, beneficiaries, and asset distribution will be legally binding. This process can relieve your family and friends of any concerns surrounding disputes or questions that may come up about the distribution of your assets.  DNR or DNAR  A DNR or DNAR order is a request not to have CPR in the event that your heart stops beating or you stop breathing. If a DNR or DNAR order has not been made and shared, a health care provider will try to help any patient whose heart has stopped or who has stopped breathing. If you plan to have surgery, talk with your health care provider about how your DNR or DNAR order will be followed if problems occur.  Summary  · Advance directives are the legal documents that allow you to make choices ahead of time about your  health care and medical treatment in case you become unable to communicate for yourself.  · The process of discussing and writing advance directives should happen over time. You can change the advance directives, even after you have signed them.  · Advance directives include DNR or DNAR orders, living chapman, and designating an agent as your medical power of .  This information is not intended to replace advice given to you by your health care provider. Make sure you discuss any questions you have with your health care provider.  Document Released: 03/26/2009 Document Revised: 11/06/2017 Document Reviewed: 11/06/2017  [employer] Interactive Patient Education © 2019 [employer]      Heel Spur    A heel spur is a bony growth that forms on the bottom of the heel bone (calcaneus). Heel spurs are common. They often cause inflammation in the band of tissue that connects the toes to the heel bone (plantar fascia). This may cause pain on the bottom of the foot, near the heel. Many people with plantar fasciitis also have heel spurs. However, spurs are not the cause of plantar fasciitis pain.  What are the causes?  The exact cause of heel spurs is not known. They may be caused by:  · Pressure on the heel bone.  · Bands of tissue (tendons) pulling on the heel bone.  What increases the risk?  You are more likely to develop this condition if you:  · Are older than 40.  · Are overweight.  · Have wear-and-tear arthritis (osteoarthritis).  · Have plantar fascia inflammation.  · Participate in sports or activities that include a lot of running or jumping.  · Wear poorly fitted shoes.  What are the signs or symptoms?  Some people have no symptoms. If you do have symptoms, they may include:  · Pain in the bottom of your heel.  · Pain that is worse when you first get out of bed.  · Pain that gets worse after walking or standing.  How is this diagnosed?  This condition may be diagnosed based on:  · Your symptoms and medical  history.  · A physical exam.  · A foot X-ray.  How is this treated?  Treatment for this condition depends on how much pain you have. Treatment options may include:  · Doing stretching exercises.  · Losing weight, if necessary.  · Wearing specific shoes or inserts inside of shoes (orthotics) for comfort and support.  · Wearing splints on your feet while you sleep. Splints keep your feet in a position (usually 90 degrees) that should prevent and relieve the pain you feel when you first get out of bed. They also make stretching easier in the morning.  · Taking over-the-counter medicine to relieve pain, such as NSAIDs.  · Using high-intensity sound waves to break up the heel spur (extracorporeal shock wave therapy).  · Getting steroid injections in your heel to reduce inflammation.  · Having surgery, if your heel spur causes long-term (chronic) pain.  Follow these instructions at home:    Activity  · Avoid activities that cause pain until you recover, or for as long as directed by your health care provider.  · Do stretching exercises as directed. Stretch before exercising or being physically active.  Managing pain, stiffness, and swelling  · If directed, put ice on your foot:  ? Put ice in a plastic bag.  ? Place a towel between your skin and the bag.  ? Leave the ice on for 20 minutes, 2-3 times a day.  · Move your toes often to avoid stiffness and to lessen swelling.  · When possible, raise (elevate) your foot above the level of your heart while you are sitting or lying down.  General instructions  · Take over-the-counter and prescription medicines only as told by your health care provider.  · Wear supportive shoes that fit well. Wear splints, inserts, or orthotics as told by your health care provider.  · If recommended, work with your health care provider to lose weight. This can relieve pressure on your foot.  · Do not use any products that contain nicotine or tobacco, such as cigarettes and e-cigarettes. These can  affect bone growth and healing. If you need help quitting, ask your health care provider.  · Keep all follow-up visits as told by your health care provider. This is important.  Contact a health care provider if:  · Your pain does not go away with treatment.  · Your pain gets worse.  Summary  · A heel spur is a bony growth that forms on the bottom of the heel bone (calcaneus).  · Heel spurs often cause inflammation in the band of tissue that connects the toes to the heel bone (plantar fascia). This may cause pain on the bottom of the foot, near the heel.  · Doing stretching exercises, losing weight, wearing specific shoes or shoe inserts, wearing splints while you sleep, and taking pain medicine may ease the pain and stiffness.  · Other treatment options may include high-intensity sound waves to break up the heel spur, steroid injections, or surgery.  This information is not intended to replace advice given to you by your health care provider. Make sure you discuss any questions you have with your health care provider.  Document Released: 01/24/2007 Document Revised: 12/05/2018 Document Reviewed: 12/05/2018  [employer] Interactive Patient Education © 2019 [employer]    Plantar Fasciitis    Plantar fasciitis is a painful foot condition that affects the heel. It occurs when the band of tissue that connects the toes to the heel bone (plantar fascia) becomes irritated. This can happen as the result of exercising too much or doing other repetitive activities (overuse injury).  The pain from plantar fasciitis can range from mild irritation to severe pain that makes it difficult to walk or move. The pain is usually worse in the morning after sleeping, or after sitting or lying down for a while. Pain may also be worse after long periods of walking or standing.  What are the causes?  This condition may be caused by:  · Standing for long periods of time.  · Wearing shoes that do not have good arch support.  · Doing activities  that put stress on joints (high-impact activities), including running, aerobics, and ballet.  · Being overweight.  · An abnormal way of walking (gait).  · Tight muscles in the back of your lower leg (calf).  · High arches in your feet.  · Starting a new athletic activity.  What are the signs or symptoms?  The main symptom of this condition is heel pain. Pain may:  · Be worse with first steps after a time of rest, especially in the morning after sleeping or after you have been sitting or lying down for a while.  · Be worse after long periods of standing still.  · Decrease after 30-45 minutes of activity, such as gentle walking.  How is this diagnosed?  This condition may be diagnosed based on your medical history and your symptoms. Your health care provider may ask questions about your activity level. Your health care provider will do a physical exam to check for:  · A tender area on the bottom of your foot.  · A high arch in your foot.  · Pain when you move your foot.  · Difficulty moving your foot.  You may have imaging tests to confirm the diagnosis, such as:  · X-rays.  · Ultrasound.  · MRI.  How is this treated?  Treatment for plantar fasciitis depends on how severe your condition is. Treatment may include:  · Rest, ice, applying pressure (compression), and raising the affected foot (elevation). This may be called RICE therapy. Your health care provider may recommend RICE therapy along with over-the-counter pain medicines to manage your pain.  · Exercises to stretch your calves and your plantar fascia.  · A splint that holds your foot in a stretched, upward position while you sleep (night splint).  · Physical therapy to relieve symptoms and prevent problems in the future.  · Injections of steroid medicine (cortisone) to relieve pain and inflammation.  · Stimulating your plantar fascia with electrical impulses (extracorporeal shock wave therapy). This is usually the last treatment option before  surgery.  · Surgery, if other treatments have not worked after 12 months.  Follow these instructions at home:    Managing pain, stiffness, and swelling  · If directed, put ice on the painful area:  ? Put ice in a plastic bag, or use a frozen bottle of water.  ? Place a towel between your skin and the bag or bottle.  ? Roll the bottom of your foot over the bag or bottle.  ? Do this for 20 minutes, 2-3 times a day.  · Wear athletic shoes that have air-sole or gel-sole cushions, or try wearing soft shoe inserts that are designed for plantar fasciitis.  · Raise (elevate) your foot above the level of your heart while you are sitting or lying down.  Activity  · Avoid activities that cause pain. Ask your health care provider what activities are safe for you.  · Do physical therapy exercises and stretches as told by your health care provider.  · Try activities and forms of exercise that are easier on your joints (low-impact). Examples include swimming, water aerobics, and biking.  General instructions  · Take over-the-counter and prescription medicines only as told by your health care provider.  · Wear a night splint while sleeping, if told by your health care provider. Loosen the splint if your toes tingle, become numb, or turn cold and blue.  · Maintain a healthy weight, or work with your health care provider to lose weight as needed.  · Keep all follow-up visits as told by your health care provider. This is important.  Contact a health care provider if you:  · Have symptoms that do not go away after caring for yourself at home.  · Have pain that gets worse.  · Have pain that affects your ability to move or do your daily activities.  Summary  · Plantar fasciitis is a painful foot condition that affects the heel. It occurs when the band of tissue that connects the toes to the heel bone (plantar fascia) becomes irritated.  · The main symptom of this condition is heel pain that may be worse after exercising too much or  standing still for a long time.  · Treatment varies, but it usually starts with rest, ice, compression, and elevation (RICE therapy) and over-the-counter medicines to manage pain.  This information is not intended to replace advice given to you by your health care provider. Make sure you discuss any questions you have with your health care provider.  Document Released: 09/12/2002 Document Revised: 10/15/2018 Document Reviewed: 10/15/2018  [employer] Interactive Patient Education © 2019 [employer]

## 2019-12-16 ENCOUNTER — TELEPHONE (OUTPATIENT)
Dept: FAMILY MEDICINE CLINIC | Facility: CLINIC | Age: 69
End: 2019-12-16

## 2019-12-16 RX ORDER — OMEPRAZOLE 40 MG/1
40 CAPSULE, DELAYED RELEASE ORAL DAILY
Qty: 90 CAPSULE | Refills: 2 | Status: SHIPPED | OUTPATIENT
Start: 2019-12-16 | End: 2023-02-21

## 2019-12-16 RX ORDER — CLOPIDOGREL BISULFATE 75 MG/1
75 TABLET ORAL DAILY
Qty: 90 TABLET | Refills: 2 | Status: SHIPPED | OUTPATIENT
Start: 2019-12-16 | End: 2020-10-23 | Stop reason: SDUPTHER

## 2019-12-16 RX ORDER — SIMVASTATIN 20 MG
20 TABLET ORAL DAILY
Qty: 90 TABLET | Refills: 2 | Status: ON HOLD | OUTPATIENT
Start: 2019-12-16 | End: 2020-07-17

## 2019-12-16 NOTE — TELEPHONE ENCOUNTER
Patient needs refills sent in for   simvastatin 20mg   clopidoprel 75mg  Omeprazole 40mg   to [facility] on [address] please.

## 2020-04-29 RX ORDER — ISOSORBIDE MONONITRATE 60 MG/1
TABLET, EXTENDED RELEASE ORAL
Qty: 90 TABLET | Refills: 3 | Status: ON HOLD | OUTPATIENT
Start: 2020-04-29 | End: 2020-07-17

## 2020-05-13 ENCOUNTER — TELEPHONE (OUTPATIENT)
Dept: FAMILY MEDICINE CLINIC | Facility: CLINIC | Age: 70
End: 2020-05-13

## 2020-05-13 NOTE — TELEPHONE ENCOUNTER
Patient states he has discussed this with you before.  He has groin pain that radiates down his legs.  No testicle pain, but does sometime have pain in his penis.  Who should he see for this?  Will he need a referral?

## 2020-05-13 NOTE — TELEPHONE ENCOUNTER
Gave message to patient at 11:37am.  He declined a video visit - said he didn't think he could figure it out.  So I scheduled a telephone visit with [facility] tomorrow at 9am.

## 2020-05-13 NOTE — TELEPHONE ENCOUNTER
I don't see where we have talked about it recently. See if he would be willing to do a video visit tomorrow so I can get more info.

## 2020-05-14 ENCOUNTER — OFFICE VISIT (OUTPATIENT)
Dept: FAMILY MEDICINE CLINIC | Facility: CLINIC | Age: 70
End: 2020-05-14

## 2020-05-14 DIAGNOSIS — R10.32 BILATERAL GROIN PAIN: ICD-10-CM

## 2020-05-14 DIAGNOSIS — R10.31 BILATERAL GROIN PAIN: ICD-10-CM

## 2020-05-14 DIAGNOSIS — M25.552 PAIN OF BOTH HIP JOINTS: Primary | ICD-10-CM

## 2020-05-14 DIAGNOSIS — M25.551 PAIN OF BOTH HIP JOINTS: Primary | ICD-10-CM

## 2020-05-14 PROCEDURE — 99442 PR PHYS/QHP TELEPHONE EVALUATION 11-20 MIN: CPT | Performed by: NURSE PRACTITIONER

## 2020-05-14 RX ORDER — CELECOXIB 200 MG/1
200 CAPSULE ORAL DAILY
Qty: 30 CAPSULE | Refills: 2
Start: 2020-05-14

## 2020-05-14 NOTE — PROGRESS NOTES
"Kendell Barlow is a 69 y.o. male.     Chief Complaint   Patient presents with   • Hip Pain   • Groin Pain       There were no vitals taken for this visit.    BP Readings from Last 3 Encounters:   10/14/19 130/84   09/17/19 137/80   07/10/19 128/73       Wt Readings from Last 3 Encounters:   10/14/19 89.6 kg (197 lb 9.6 oz)   09/17/19 90.7 kg (200 lb)   07/10/19 86.6 kg (191 lb)       You have chosen to receive care through a telephone visit. Do you consent to use a telephone visit for your medical care today? Yes    Telephone visit with pt this morning. He has c/o pain in groin/hip and radiating down legs.   Sitting in chair and then getting up causes pain. Says first few steps are \"miserable\".   Can't tell if pain is in hips, legs, or where it is coming from.   Not doing much activity.   Using cane when walking.   Has been sedentary for a while since shut down.   No pain when lying on side.   Has tried IBU on occasion.   Does have rx for celebrex and will take it when pain is worse. Doesn't take it often.          The following portions of the patient's history were reviewed and updated as appropriate: allergies, current medications, past family history, past medical history, past social history, past surgical history and problem list.    Review of Systems   Musculoskeletal: Positive for arthralgias, gait problem and bursitis. Negative for back pain.   Neurological: Negative for weakness and numbness.       Objective   Physical Exam   Constitutional: He is oriented to person, place, and time.   Neurological: He is alert and oriented to person, place, and time.   Psychiatric: He has a normal mood and affect. His behavior is normal.       Diagnoses and all orders for this visit:    1. Pain of both hip joints (Primary)    2. Bilateral groin pain    Other orders  -     celecoxib (CeleBREX) 200 MG capsule; Take 1 capsule by mouth Daily.  Dispense: 30 capsule; Refill: 2    hard to say what is causing the " pain w/o exam, but sounds like he is having arthritis pain in hips. He is going to start his celebrex daily for 2 weeks and see if it improves. He will make an appt if no improvement.  During this office visit, we discussed the pertinent aspects of the visit and treatment recommendations. Pt verbalizes understanding. Follow up was discussed. Patient was given the opportunity to ask questions and discuss other concerns.     This visit has been rescheduled as a phone visit to comply with patient safety concerns in accordance with CDC recommendations. Total time of discussion was 11 minutes.        Return if symptoms worsen or fail to improve.

## 2020-06-08 ENCOUNTER — TELEPHONE (OUTPATIENT)
Dept: CARDIOLOGY | Facility: CLINIC | Age: 70
End: 2020-06-08

## 2020-06-08 NOTE — TELEPHONE ENCOUNTER
"Patient called he has a 6/18/20 appt.   He states his may appt was cancelled due to the pandemic. He is calling today because he has decreased stamina \"he just woke up from a 3 hr nap\" and he feels like his \"skin is crawling\" from his ear lobe to his shoulder he feels like his neck is asleep. He states when he touches his neck he feels like he has some loss of feeling. He denies SOA, and Chest pain. He states he does have some pressure I his chest when he bends over for a prolonged period of time (in the flower bed). He states MICHELLE Lao did prescribe Celebrex and he started taking 1 pill once daily.     I advised if pain worsens or he has SOA to go to the ER. Pt verbalizes understanding.     Please advise.   "

## 2020-06-18 ENCOUNTER — OFFICE VISIT (OUTPATIENT)
Dept: CARDIOLOGY | Facility: CLINIC | Age: 70
End: 2020-06-18

## 2020-06-18 VITALS
OXYGEN SATURATION: 97 % | WEIGHT: 207 LBS | BODY MASS INDEX: 31.37 KG/M2 | HEIGHT: 68 IN | HEART RATE: 72 BPM | SYSTOLIC BLOOD PRESSURE: 112 MMHG | DIASTOLIC BLOOD PRESSURE: 65 MMHG

## 2020-06-18 DIAGNOSIS — Z95.5 S/P PRIMARY ANGIOPLASTY WITH CORONARY STENT: ICD-10-CM

## 2020-06-18 DIAGNOSIS — I25.10 CORONARY ARTERY DISEASE INVOLVING NATIVE CORONARY ARTERY OF NATIVE HEART WITHOUT ANGINA PECTORIS: ICD-10-CM

## 2020-06-18 DIAGNOSIS — R06.02 SHORTNESS OF BREATH: Primary | ICD-10-CM

## 2020-06-18 DIAGNOSIS — I10 ESSENTIAL HYPERTENSION: ICD-10-CM

## 2020-06-18 DIAGNOSIS — E78.2 MIXED HYPERLIPIDEMIA: ICD-10-CM

## 2020-06-18 DIAGNOSIS — R53.83 TIREDNESS: ICD-10-CM

## 2020-06-18 PROCEDURE — 93000 ELECTROCARDIOGRAM COMPLETE: CPT | Performed by: INTERNAL MEDICINE

## 2020-06-18 PROCEDURE — 99214 OFFICE O/P EST MOD 30 MIN: CPT | Performed by: INTERNAL MEDICINE

## 2020-06-18 NOTE — PROGRESS NOTES
Encounter Date:06/18/2020  Last seen 9/17/2019      Patient ID: Evaristo Barlow is a 69 y.o. male.    Chief Complaint:    Status post stent  Hypertension  Dyslipidemia  Tiredness and shortness of breath-new problem     History of Present Illness  Patient recently has been having tiredness and shortness of breath  Patient also has been having left shoulder and left side of the neck tingling sensation    Since I have last seen, the patient has been without any chest discomfort palpitations, dizziness or syncope.  Denies having any headache ,abdominal pain ,nausea, vomiting , diarrhea constipation, loss of weight or loss of appetite.  Denies having any excessive bruising ,hematuria or blood in the stool.     Review of all systems negative except as indicated     Assessment and Plan         [[[[[[[[[[[[[[[[[[[[[  Impression  ==============  -Shortness of breath and tiredness    -Tingling in the left side of the face and neck.     -status post stent to diagonal branch 06/21/2010 .    Stress Cardiolite test 06/15/2017 revealed reproducible chest discomfort 1.5 mm of inferior and anterolateral ST depression and inferior and apical moderate to significant ischemia.     Cardiac catheterization 06/21/2017 revealed normal left ventricular function diagonal branch stent was patent.  No significant disease was present. Please note RCA was engaged using 3D RC catheter.     - chest discomfort Extreme weakness and tiredness -Improved since lisinopril  was discontinued.      -hypertension and dyslipidemia     -recently diagnosed Solis's esophagitis      -strong family history of coronary artery disease      -former smoker      -allergy to penicillin.  ================    Plan  ============  Shortness of breath and tiredness.  Tingling in the neck and left arm likely noncardiac.  EKG showed sinus bradycardia without any ischemic changes  patient is off lisinopril  Continue Plavix and Imdur 60 mg a day and simvastatin 20 mg a  day   blood pressure is better controlled-  Medications were reviewed and updated.  Patient recently has moved to [place].  Lexiscan Cardiolite test  Echocardiogram  Lab work including TSH comprehensive metabolic panel lipid panel magnesium level and CBC.  Follow-up in the office on the same day.  Follow-up with primary care regarding left shoulder and left-sided neck tingling.  Further plan will depend on patient's progress.  [[[[[[[[[[[[[[[[[[[[[[[[[[                    Diagnosis Plan   1. Essential hypertension     2. S/P primary angioplasty with coronary stent     3. Mixed hyperlipidemia     4. Coronary artery disease involving native coronary artery of native heart without angina pectoris     LAB RESULTS (LAST 7 DAYS)    CBC        BMP        CMP         BNP        TROPONIN        CoAg        Creatinine Clearance  CrCl cannot be calculated (Patient's most recent lab result is older than the maximum 30 days allowed.).    ABG        Radiology  No radiology results for the last day                The following portions of the patient's history were reviewed and updated as appropriate: allergies, current medications, past family history, past medical history, past social history and problem list.    Review of Systems   Constitution: Negative for malaise/fatigue.   Cardiovascular: Negative for chest pain, leg swelling, palpitations and syncope.   Respiratory: Positive for shortness of breath.    Skin: Negative for rash.   Gastrointestinal: Negative for nausea and vomiting.   Neurological: Negative for dizziness, light-headedness and numbness.         Current Outpatient Medications:   •  aspirin 325 MG tablet, Take 325 mg by mouth every night at bedtime., Disp: , Rfl:   •  calcium carbonate-vitamin d (CALCIUM 600+D) 600-400 MG-UNIT per tablet, Take  by mouth 2 (Two) Times a Day., Disp: , Rfl:   •  celecoxib (CeleBREX) 200 MG capsule, Take 1 capsule by mouth Daily., Disp: 30 capsule, Rfl: 2  •  clopidogrel (PLAVIX)  75 MG tablet, Take 1 tablet by mouth Daily., Disp: 90 tablet, Rfl: 2  •  coenzyme Q10 100 MG capsule, Take 100 mg by mouth Daily., Disp: , Rfl:   •  isosorbide mononitrate (IMDUR) 60 MG 24 hr tablet, TAKE 1 TABLET BY MOUTH ONCE DAILY IN THE MORNING, Disp: 90 tablet, Rfl: 3  •  magnesium oxide (MAGOX) 400 (241.3 Mg) MG tablet tablet, Take 400 mg by mouth every night at bedtime., Disp: , Rfl:   •  nitroglycerin (NITROSTAT) 0.4 MG SL tablet, Place 0.4 mg under the tongue Every 5 (Five) Minutes As Needed for Chest Pain. Take no more than 3 doses in 15 minutes., Disp: , Rfl:   •  omeprazole (priLOSEC) 40 MG capsule, Take 1 capsule by mouth Daily., Disp: 90 capsule, Rfl: 2  •  Potassium 99 MG tablet, Take 1 tablet by mouth Daily., Disp: , Rfl:   •  simvastatin (ZOCOR) 20 MG tablet, Take 1 tablet by mouth Daily., Disp: 90 tablet, Rfl: 2    Allergies   Allergen Reactions   • Morphine Anaphylaxis       Family History   Problem Relation Age of Onset   • Heart failure Mother    • Heart failure Father    • Heart disease Brother    • Heart disease Brother    • Heart attack Brother    • No Known Problems Brother        Past Surgical History:   Procedure Laterality Date   • CORONARY ANGIOPLASTY WITH STENT PLACEMENT  06/21/2010   • UMBILICAL HERNIA REPAIR         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   • Biliary dyskinesia 6/17/2019   • Coronary artery disease    • Diverticulosis 8/7/2018   • Gastric reflux 6/17/2019   • Hemorrhoids 6/17/2019   • Hyperlipidemia 6/17/2019   • Hypertension 6/17/2019       Family History   Problem Relation Age of Onset   • Heart failure Mother    • Heart failure Father    • Heart disease Brother    • Heart disease Brother    • Heart attack Brother    • No Known Problems Brother        Social History     Socioeconomic History   • Marital status:      Spouse name: Not on file   • Number of children: 2   • Years of education: Not on file   •  "Highest education level: Not on file   Occupational History   • Occupation:       Comment: [employer]   Tobacco Use   • Smoking status: Former Smoker     Years: 20.00     Types: Cigarettes     Last attempt to quit: 1978     Years since quittin.4   • Smokeless tobacco: Never Used   Substance and Sexual Activity   • Alcohol use: No     Frequency: Never   • Drug use: No   • Sexual activity: Defer   Social History Narrative    Hobbies: walking            ECG 12 Lead  Date/Time: 2020 4:16 PM  Performed by: Talia Hickey MD  Authorized by: Talia Hickey MD   Comparison: compared with previous ECG   Similar to previous ECG  Comments: Normal sinus rhythm nonspecific ST-T wave changes 78/min right axis deviation no ectopy no change from 2019              Objective:       Physical Exam    /65 (BP Location: Left arm, Patient Position: Sitting, Cuff Size: Large Adult)   Pulse 72   Ht 172.7 cm (68\")   Wt 93.9 kg (207 lb)   SpO2 97%   BMI 31.47 kg/m²   The patient is alert, oriented and in no distress.    Vital signs as noted above.    Head and neck revealed no carotid bruits or jugular venous distension.  No thyromegaly or lymphadenopathy is present.    Lungs clear.  No wheezing.  Breath sounds are normal bilaterally.    Heart normal first and second heart sounds.  No murmur..  No pericardial rub is present.  No gallop is present.    Abdomen soft and nontender.  No organomegaly is present.    Extremities revealed good peripheral pulses without any pedal edema.    Skin warm and dry.    Musculoskeletal system is grossly normal.    CNS grossly normal.        "

## 2020-06-19 ENCOUNTER — LAB (OUTPATIENT)
Dept: LAB | Facility: HOSPITAL | Age: 70
End: 2020-06-19

## 2020-06-19 DIAGNOSIS — R53.83 TIREDNESS: ICD-10-CM

## 2020-06-19 DIAGNOSIS — E78.2 MIXED HYPERLIPIDEMIA: ICD-10-CM

## 2020-06-19 DIAGNOSIS — R06.02 SHORTNESS OF BREATH: ICD-10-CM

## 2020-06-19 DIAGNOSIS — Z95.5 S/P PRIMARY ANGIOPLASTY WITH CORONARY STENT: ICD-10-CM

## 2020-06-19 DIAGNOSIS — I10 ESSENTIAL HYPERTENSION: ICD-10-CM

## 2020-06-19 LAB
ALBUMIN SERPL-MCNC: 4.5 G/DL (ref 3.5–5.2)
ALBUMIN/GLOB SERPL: 2.5 G/DL
ALP SERPL-CCNC: 58 U/L (ref 39–117)
ALT SERPL W P-5'-P-CCNC: 14 U/L (ref 1–41)
ANION GAP SERPL CALCULATED.3IONS-SCNC: 10.4 MMOL/L (ref 5–15)
ANISOCYTOSIS BLD QL: NORMAL
AST SERPL-CCNC: 16 U/L (ref 1–40)
BASOPHILS # BLD MANUAL: 0.07 10*3/MM3 (ref 0–0.2)
BASOPHILS NFR BLD AUTO: 1 % (ref 0–1.5)
BILIRUB SERPL-MCNC: 1 MG/DL (ref 0.2–1.2)
BUN BLD-MCNC: 24 MG/DL (ref 8–23)
BUN/CREAT SERPL: 25.8 (ref 7–25)
CALCIUM SPEC-SCNC: 9.5 MG/DL (ref 8.6–10.5)
CHLORIDE SERPL-SCNC: 102 MMOL/L (ref 98–107)
CHOLEST SERPL-MCNC: 119 MG/DL (ref 0–200)
CO2 SERPL-SCNC: 25.6 MMOL/L (ref 22–29)
CREAT BLD-MCNC: 0.93 MG/DL (ref 0.76–1.27)
DEPRECATED RDW RBC AUTO: 43.3 FL (ref 37–54)
ELLIPTOCYTES BLD QL SMEAR: NORMAL
EOSINOPHIL # BLD MANUAL: 0.14 10*3/MM3 (ref 0–0.4)
EOSINOPHIL NFR BLD MANUAL: 2 % (ref 0.3–6.2)
ERYTHROCYTE [DISTWIDTH] IN BLOOD BY AUTOMATED COUNT: 16.9 % (ref 12.3–15.4)
GFR SERPL CREATININE-BSD FRML MDRD: 80 ML/MIN/1.73
GLOBULIN UR ELPH-MCNC: 1.8 GM/DL
GLUCOSE BLD-MCNC: 97 MG/DL (ref 65–99)
HCT VFR BLD AUTO: 39.4 % (ref 37.5–51)
HDLC SERPL-MCNC: 30 MG/DL (ref 40–60)
HGB BLD-MCNC: 11.5 G/DL (ref 13–17.7)
LDLC SERPL CALC-MCNC: 62 MG/DL (ref 0–100)
LDLC/HDLC SERPL: 2.05 {RATIO}
LYMPHOCYTES # BLD MANUAL: 1.73 10*3/MM3 (ref 0.7–3.1)
LYMPHOCYTES NFR BLD MANUAL: 24 % (ref 19.6–45.3)
LYMPHOCYTES NFR BLD MANUAL: 5 % (ref 5–12)
MAGNESIUM SERPL-MCNC: 2 MG/DL (ref 1.6–2.4)
MCH RBC QN AUTO: 20.9 PG (ref 26.6–33)
MCHC RBC AUTO-ENTMCNC: 29.2 G/DL (ref 31.5–35.7)
MCV RBC AUTO: 71.6 FL (ref 79–97)
MONOCYTES # BLD AUTO: 0.36 10*3/MM3 (ref 0.1–0.9)
NEUTROPHILS # BLD AUTO: 4.9 10*3/MM3 (ref 1.7–7)
NEUTROPHILS NFR BLD MANUAL: 68 % (ref 42.7–76)
OVALOCYTES BLD QL SMEAR: NORMAL
PLAT MORPH BLD: NORMAL
PLATELET # BLD AUTO: 212 10*3/MM3 (ref 140–450)
POIKILOCYTOSIS BLD QL SMEAR: NORMAL
POTASSIUM BLD-SCNC: 4.3 MMOL/L (ref 3.5–5.2)
PROT SERPL-MCNC: 6.3 G/DL (ref 6–8.5)
RBC # BLD AUTO: 5.5 10*6/MM3 (ref 4.14–5.8)
SODIUM BLD-SCNC: 138 MMOL/L (ref 136–145)
TRIGL SERPL-MCNC: 137 MG/DL (ref 0–150)
TSH SERPL DL<=0.05 MIU/L-ACNC: 1.96 UIU/ML (ref 0.27–4.2)
VLDLC SERPL-MCNC: 27.4 MG/DL (ref 5–40)
WBC MORPH BLD: NORMAL
WBC NRBC COR # BLD: 7.21 10*3/MM3 (ref 3.4–10.8)

## 2020-06-19 PROCEDURE — 80053 COMPREHEN METABOLIC PANEL: CPT

## 2020-06-19 PROCEDURE — 85025 COMPLETE CBC W/AUTO DIFF WBC: CPT

## 2020-06-19 PROCEDURE — 36415 COLL VENOUS BLD VENIPUNCTURE: CPT

## 2020-06-19 PROCEDURE — 84443 ASSAY THYROID STIM HORMONE: CPT

## 2020-06-19 PROCEDURE — 83735 ASSAY OF MAGNESIUM: CPT

## 2020-06-19 PROCEDURE — 85007 BL SMEAR W/DIFF WBC COUNT: CPT

## 2020-06-19 PROCEDURE — 80061 LIPID PANEL: CPT

## 2020-07-14 ENCOUNTER — HOSPITAL ENCOUNTER (OUTPATIENT)
Dept: CARDIOLOGY | Facility: HOSPITAL | Age: 70
Discharge: HOME | End: 2020-07-14

## 2020-07-14 ENCOUNTER — OFFICE VISIT (OUTPATIENT)
Dept: CARDIOLOGY | Facility: CLINIC | Age: 70
End: 2020-07-14

## 2020-07-14 VITALS
WEIGHT: 207 LBS | DIASTOLIC BLOOD PRESSURE: 80 MMHG | HEIGHT: 68 IN | BODY MASS INDEX: 31.37 KG/M2 | HEART RATE: 56 BPM | SYSTOLIC BLOOD PRESSURE: 134 MMHG

## 2020-07-14 VITALS
SYSTOLIC BLOOD PRESSURE: 148 MMHG | BODY MASS INDEX: 31.37 KG/M2 | HEIGHT: 68 IN | DIASTOLIC BLOOD PRESSURE: 75 MMHG | HEART RATE: 58 BPM | WEIGHT: 207 LBS | RESPIRATION RATE: 20 BRPM

## 2020-07-14 DIAGNOSIS — R06.02 SHORTNESS OF BREATH: ICD-10-CM

## 2020-07-14 DIAGNOSIS — I10 ESSENTIAL HYPERTENSION: ICD-10-CM

## 2020-07-14 DIAGNOSIS — Z95.5 S/P PRIMARY ANGIOPLASTY WITH CORONARY STENT: ICD-10-CM

## 2020-07-14 DIAGNOSIS — R53.83 TIREDNESS: ICD-10-CM

## 2020-07-14 DIAGNOSIS — E78.2 MIXED HYPERLIPIDEMIA: ICD-10-CM

## 2020-07-14 DIAGNOSIS — Z95.820 STATUS POST ANGIOPLASTY WITH STENT: ICD-10-CM

## 2020-07-14 DIAGNOSIS — R94.39 ABNORMAL NUCLEAR STRESS TEST: Primary | ICD-10-CM

## 2020-07-14 LAB
BH CV ECHO MEAS - AO MAX PG (FULL): 5.3 MMHG
BH CV ECHO MEAS - AO MAX PG: 14.8 MMHG
BH CV ECHO MEAS - AO MEAN PG (FULL): 1.9 MMHG
BH CV ECHO MEAS - AO MEAN PG: 6.3 MMHG
BH CV ECHO MEAS - AO ROOT AREA (BSA CORRECTED): 1.3
BH CV ECHO MEAS - AO ROOT AREA: 5.6 CM^2
BH CV ECHO MEAS - AO ROOT DIAM: 2.7 CM
BH CV ECHO MEAS - AO V2 MAX: 192.4 CM/SEC
BH CV ECHO MEAS - AO V2 MEAN: 118 CM/SEC
BH CV ECHO MEAS - AO V2 VTI: 37.6 CM
BH CV ECHO MEAS - ASC AORTA: 2.4 CM
BH CV ECHO MEAS - AVA(I,A): 3 CM^2
BH CV ECHO MEAS - AVA(I,D): 3 CM^2
BH CV ECHO MEAS - AVA(V,A): 2.7 CM^2
BH CV ECHO MEAS - AVA(V,D): 2.7 CM^2
BH CV ECHO MEAS - BSA(HAYCOCK): 2.2 M^2
BH CV ECHO MEAS - BSA: 2.1 M^2
BH CV ECHO MEAS - BZI_BMI: 31.5 KILOGRAMS/M^2
BH CV ECHO MEAS - BZI_METRIC_HEIGHT: 172.7 CM
BH CV ECHO MEAS - BZI_METRIC_WEIGHT: 93.9 KG
BH CV ECHO MEAS - EDV(CUBED): 32.7 ML
BH CV ECHO MEAS - EDV(MOD-SP2): 59.2 ML
BH CV ECHO MEAS - EDV(MOD-SP4): 49.2 ML
BH CV ECHO MEAS - EDV(TEICH): 40.9 ML
BH CV ECHO MEAS - EF(CUBED): 51 %
BH CV ECHO MEAS - EF(MOD-BP): 50 %
BH CV ECHO MEAS - EF(MOD-SP2): 47.5 %
BH CV ECHO MEAS - EF(MOD-SP4): 50.2 %
BH CV ECHO MEAS - EF(TEICH): 44.3 %
BH CV ECHO MEAS - ESV(CUBED): 16 ML
BH CV ECHO MEAS - ESV(MOD-SP2): 31.1 ML
BH CV ECHO MEAS - ESV(MOD-SP4): 24.5 ML
BH CV ECHO MEAS - ESV(TEICH): 22.8 ML
BH CV ECHO MEAS - FS: 21.2 %
BH CV ECHO MEAS - IVS/LVPW: 0.89
BH CV ECHO MEAS - IVSD: 1.8 CM
BH CV ECHO MEAS - LA DIMENSION(2D): 3.8 CM
BH CV ECHO MEAS - LV DIASTOLIC VOL/BSA (35-75): 23.7 ML/M^2
BH CV ECHO MEAS - LV MASS(C)D: 255.3 GRAMS
BH CV ECHO MEAS - LV MASS(C)DI: 123.1 GRAMS/M^2
BH CV ECHO MEAS - LV MAX PG: 9.5 MMHG
BH CV ECHO MEAS - LV MEAN PG: 4.4 MMHG
BH CV ECHO MEAS - LV SYSTOLIC VOL/BSA (12-30): 11.8 ML/M^2
BH CV ECHO MEAS - LV V1 MAX: 154.3 CM/SEC
BH CV ECHO MEAS - LV V1 MEAN: 96.9 CM/SEC
BH CV ECHO MEAS - LV V1 VTI: 33.9 CM
BH CV ECHO MEAS - LVIDD: 3.2 CM
BH CV ECHO MEAS - LVIDS: 2.5 CM
BH CV ECHO MEAS - LVOT AREA: 3.3 CM^2
BH CV ECHO MEAS - LVOT DIAM: 2.1 CM
BH CV ECHO MEAS - LVPWD: 2 CM
BH CV ECHO MEAS - MV A MAX VEL: 113.1 CM/SEC
BH CV ECHO MEAS - MV DEC SLOPE: 488.9 CM/SEC^2
BH CV ECHO MEAS - MV DEC TIME: 0.19 SEC
BH CV ECHO MEAS - MV E MAX VEL: 94.2 CM/SEC
BH CV ECHO MEAS - MV E/A: 0.83
BH CV ECHO MEAS - MV MAX PG: 4.3 MMHG
BH CV ECHO MEAS - MV MEAN PG: 1.8 MMHG
BH CV ECHO MEAS - MV V2 MAX: 104 CM/SEC
BH CV ECHO MEAS - MV V2 MEAN: 61.3 CM/SEC
BH CV ECHO MEAS - MV V2 VTI: 39.7 CM
BH CV ECHO MEAS - MVA(VTI): 2.8 CM^2
BH CV ECHO MEAS - PA ACC TIME: 0.15 SEC
BH CV ECHO MEAS - PA MAX PG (FULL): 2.5 MMHG
BH CV ECHO MEAS - PA MAX PG: 5.6 MMHG
BH CV ECHO MEAS - PA MEAN PG (FULL): 1.1 MMHG
BH CV ECHO MEAS - PA MEAN PG: 2.7 MMHG
BH CV ECHO MEAS - PA PR(ACCEL): 12 MMHG
BH CV ECHO MEAS - PA V2 MAX: 118.4 CM/SEC
BH CV ECHO MEAS - PA V2 MEAN: 77.6 CM/SEC
BH CV ECHO MEAS - PA V2 VTI: 26.9 CM
BH CV ECHO MEAS - PULM DIAS VEL: 88.1 CM/SEC
BH CV ECHO MEAS - PULM S/D: 0.82
BH CV ECHO MEAS - PULM SYS VEL: 72.5 CM/SEC
BH CV ECHO MEAS - PVA(I,A): 4 CM^2
BH CV ECHO MEAS - PVA(I,D): 4 CM^2
BH CV ECHO MEAS - PVA(V,A): 4.3 CM^2
BH CV ECHO MEAS - PVA(V,D): 4.3 CM^2
BH CV ECHO MEAS - QP/QS: 0.94
BH CV ECHO MEAS - RV MAX PG: 3.2 MMHG
BH CV ECHO MEAS - RV MEAN PG: 1.6 MMHG
BH CV ECHO MEAS - RV V1 MAX: 88.8 CM/SEC
BH CV ECHO MEAS - RV V1 MEAN: 58.9 CM/SEC
BH CV ECHO MEAS - RV V1 VTI: 18.8 CM
BH CV ECHO MEAS - RVDD: 3.5 CM
BH CV ECHO MEAS - RVOT AREA: 5.7 CM^2
BH CV ECHO MEAS - RVOT DIAM: 2.7 CM
BH CV ECHO MEAS - SI(AO): 102.1 ML/M^2
BH CV ECHO MEAS - SI(CUBED): 8 ML/M^2
BH CV ECHO MEAS - SI(LVOT): 54.4 ML/M^2
BH CV ECHO MEAS - SI(MOD-SP2): 13.6 ML/M^2
BH CV ECHO MEAS - SI(MOD-SP4): 11.9 ML/M^2
BH CV ECHO MEAS - SI(TEICH): 8.7 ML/M^2
BH CV ECHO MEAS - SV(AO): 211.7 ML
BH CV ECHO MEAS - SV(CUBED): 16.7 ML
BH CV ECHO MEAS - SV(LVOT): 112.9 ML
BH CV ECHO MEAS - SV(MOD-SP2): 28.1 ML
BH CV ECHO MEAS - SV(MOD-SP4): 24.7 ML
BH CV ECHO MEAS - SV(RVOT): 106.3 ML
BH CV ECHO MEAS - SV(TEICH): 18.1 ML
BH CV STRESS BP STAGE 1: NORMAL
BH CV STRESS BP STAGE 2: NORMAL
BH CV STRESS DURATION MIN STAGE 1: 3
BH CV STRESS DURATION MIN STAGE 2: 2
BH CV STRESS DURATION SEC STAGE 1: 0
BH CV STRESS DURATION SEC STAGE 2: 30
BH CV STRESS GRADE STAGE 1: 10
BH CV STRESS GRADE STAGE 2: 12
BH CV STRESS HR STAGE 1: 107
BH CV STRESS HR STAGE 2: 116
BH CV STRESS METS STAGE 1: 5
BH CV STRESS METS STAGE 2: 7.5
BH CV STRESS PROTOCOL 1: NORMAL
BH CV STRESS RECOVERY BP: NORMAL MMHG
BH CV STRESS RECOVERY HR: 84 BPM
BH CV STRESS SPEED STAGE 1: 1.7
BH CV STRESS SPEED STAGE 2: 2.5
BH CV STRESS STAGE 1: 1
BH CV STRESS STAGE 2: 2
LV EF 2D ECHO EST: 60 %
MAXIMAL PREDICTED HEART RATE: 149 BPM
PERCENT MAX PREDICTED HR: 77.85 %
STRESS BASELINE BP: NORMAL MMHG
STRESS BASELINE HR: 57 BPM
STRESS PERCENT HR: 92 %
STRESS POST ESTIMATED WORKLOAD: 7 METS
STRESS POST EXERCISE DUR MIN: 5 MIN
STRESS POST EXERCISE DUR SEC: 30 SEC
STRESS POST PEAK BP: NORMAL MMHG
STRESS POST PEAK HR: 116 BPM
STRESS TARGET HR: 127 BPM

## 2020-07-14 PROCEDURE — 0 TECHNETIUM SESTAMIBI: Performed by: INTERNAL MEDICINE

## 2020-07-14 PROCEDURE — 93306 TTE W/DOPPLER COMPLETE: CPT | Performed by: INTERNAL MEDICINE

## 2020-07-14 PROCEDURE — 78452 HT MUSCLE IMAGE SPECT MULT: CPT

## 2020-07-14 PROCEDURE — 93017 CV STRESS TEST TRACING ONLY: CPT

## 2020-07-14 PROCEDURE — 93018 CV STRESS TEST I&R ONLY: CPT | Performed by: INTERNAL MEDICINE

## 2020-07-14 PROCEDURE — A9500 TC99M SESTAMIBI: HCPCS | Performed by: INTERNAL MEDICINE

## 2020-07-14 PROCEDURE — 78452 HT MUSCLE IMAGE SPECT MULT: CPT | Performed by: INTERNAL MEDICINE

## 2020-07-14 PROCEDURE — 99215 OFFICE O/P EST HI 40 MIN: CPT | Performed by: INTERNAL MEDICINE

## 2020-07-14 PROCEDURE — 93016 CV STRESS TEST SUPVJ ONLY: CPT | Performed by: INTERNAL MEDICINE

## 2020-07-14 PROCEDURE — 93306 TTE W/DOPPLER COMPLETE: CPT

## 2020-07-14 RX ORDER — VIT C/B6/B5/MAGNESIUM/HERB 173 50-5-6-5MG
1000 CAPSULE ORAL DAILY
COMMUNITY
End: 2023-02-21

## 2020-07-14 RX ADMIN — TECHNETIUM TC 99M SESTAMIBI 1 DOSE: 1 INJECTION INTRAVENOUS at 09:15

## 2020-07-14 NOTE — H&P (VIEW-ONLY)
Encounter Date:07/14/2020  Last seen 6/18/2020      Patient ID: Evaristo Barlow is a 69 y.o. male.    Chief Complaint:  Status post stent  Hypertension  Dyslipidemia  Tiredness and shortness of breath-new problem     History of Present Illness  Patient recently has been having tiredness and shortness of breath  Patient also has been having left shoulder and left side of the neck tingling sensation     Since I have last seen, the patient has been without any chest discomfort palpitations, dizziness or syncope.  Denies having any headache ,abdominal pain ,nausea, vomiting , diarrhea constipation, loss of weight or loss of appetite.  Denies having any excessive bruising ,hematuria or blood in the stool.     Review of all systems negative except as indicated     Assessment and Plan         [[[[[[[[[[[[[[[[[[[[[  Impression  ==============  -Shortness of breath and tiredness  Echocardiogram 7/14/2020  Mild aortic valve stenosis.  Gradient of 14 mmHg mean gradient 7 mmHg  Left ventricular size and contractility is normal with ejection fraction of 60%    Stress Cardiolite test-7/14/2020-abnormal with proximal inferior ischemia       -Tingling in the left side of the face and neck.      -status post stent to diagonal branch 06/21/2010 .    Stress Cardiolite test 06/15/2017 revealed reproducible chest discomfort 1.5 mm of inferior and anterolateral ST depression and inferior and apical moderate to significant ischemia.     Cardiac catheterization 06/21/2017 revealed normal left ventricular function diagonal branch stent was patent.  No significant disease was present. Please note RCA was engaged using 3D RC catheter.     - chest discomfort Extreme weakness and tiredness -Improved since lisinopril  was discontinued.      -hypertension and dyslipidemia     -recently diagnosed Solis's esophagitis      -strong family history of coronary artery disease      -former smoker      -allergy to  penicillin.  ================    Plan  ============  Shortness of breath and tiredness.  Tingling in the neck and left arm likely noncardiac.  Recent EKG showed sinus bradycardia without any ischemic changes  patient is off lisinopril  Continue Plavix and Imdur 60 mg a day and simvastatin 20 mg a day   blood pressure is better controlled-  Medications were reviewed and updated.  Patient recently has moved to [place].  Lexiscan Cardiolite test-abnormal as above with inferior ischemia  Echocardiogram-as above  Lab work including TSH comprehensive metabolic panel lipid panel magnesium level and CBC.-Reviewed and within normal range  Patient was advised left and right heart catheterization  @@(please note RCA was injected using 3D RCA catheter.)@@  Risks and benefits pros and cons of the procedure were discussed with patient.  Further plan will depend on patient's progress.  [[[[[[[[[[[[[[[[[[[[[[[[[[                        Diagnosis Plan   1. Abnormal nuclear stress test  Case Request Cath Lab: Left and Right Heart Cath with Coronary Angiography    CBC (No Diff)    Basic Metabolic Panel    Protime-INR    ECG 12 Lead    XR Chest 2 View   2. Shortness of breath  Case Request Cath Lab: Left and Right Heart Cath with Coronary Angiography    CBC (No Diff)    Basic Metabolic Panel    Protime-INR    ECG 12 Lead    XR Chest 2 View   3. Status post angioplasty with stent  Case Request Cath Lab: Left and Right Heart Cath with Coronary Angiography    CBC (No Diff)    Basic Metabolic Panel    Protime-INR    ECG 12 Lead    XR Chest 2 View   LAB RESULTS (LAST 7 DAYS)    CBC        BMP        CMP         BNP        TROPONIN        CoAg        Creatinine Clearance  Estimated Creatinine Clearance: 81 mL/min (by C-G formula based on SCr of 0.93 mg/dL).    ABG        Radiology  No radiology results for the last day                The following portions of the patient's history were reviewed and updated as appropriate: allergies,  current medications, past family history, past medical history, past social history, past surgical history and problem list.    Review of Systems   Constitution: Negative for malaise/fatigue.   Cardiovascular: Negative for chest pain, leg swelling, palpitations and syncope.   Respiratory: Negative for shortness of breath.    Skin: Negative for rash.   Gastrointestinal: Negative for nausea and vomiting.   Neurological: Negative for dizziness, light-headedness and numbness.         Current Outpatient Medications:   •  aspirin 325 MG tablet, Take 325 mg by mouth every night at bedtime., Disp: , Rfl:   •  calcium carbonate-vitamin d (CALCIUM 600+D) 600-400 MG-UNIT per tablet, Take  by mouth 2 (Two) Times a Day., Disp: , Rfl:   •  celecoxib (CeleBREX) 200 MG capsule, Take 1 capsule by mouth Daily., Disp: 30 capsule, Rfl: 2  •  clopidogrel (PLAVIX) 75 MG tablet, Take 1 tablet by mouth Daily., Disp: 90 tablet, Rfl: 2  •  coenzyme Q10 100 MG capsule, Take 100 mg by mouth Daily., Disp: , Rfl:   •  isosorbide mononitrate (IMDUR) 60 MG 24 hr tablet, TAKE 1 TABLET BY MOUTH ONCE DAILY IN THE MORNING, Disp: 90 tablet, Rfl: 3  •  magnesium oxide (MAGOX) 400 (241.3 Mg) MG tablet tablet, Take 400 mg by mouth every night at bedtime., Disp: , Rfl:   •  nitroglycerin (NITROSTAT) 0.4 MG SL tablet, Place 0.4 mg under the tongue Every 5 (Five) Minutes As Needed for Chest Pain. Take no more than 3 doses in 15 minutes., Disp: , Rfl:   •  omeprazole (priLOSEC) 40 MG capsule, Take 1 capsule by mouth Daily., Disp: 90 capsule, Rfl: 2  •  Potassium 99 MG tablet, Take 1 tablet by mouth Daily., Disp: , Rfl:   •  simvastatin (ZOCOR) 20 MG tablet, Take 1 tablet by mouth Daily., Disp: 90 tablet, Rfl: 2  No current facility-administered medications for this visit.     Allergies   Allergen Reactions   • Morphine Anaphylaxis       Family History   Problem Relation Age of Onset   • Heart failure Mother    • Heart failure Father    • Heart disease Brother  "   • Heart disease Brother    • Heart attack Brother    • No Known Problems Brother        Past Surgical History:   Procedure Laterality Date   • CORONARY ANGIOPLASTY WITH STENT PLACEMENT  2010   • UMBILICAL HERNIA REPAIR         Past Medical History:   Diagnosis Date   • Arthritis 2019   • Solis esophagus 2018   • Biliary dyskinesia 2019   • Coronary artery disease    • Diverticulosis 2018   • Gastric reflux 2019   • Hemorrhoids 2019   • Hyperlipidemia 2019   • Hypertension 2019       Family History   Problem Relation Age of Onset   • Heart failure Mother    • Heart failure Father    • Heart disease Brother    • Heart disease Brother    • Heart attack Brother    • No Known Problems Brother        Social History     Socioeconomic History   • Marital status:      Spouse name: Not on file   • Number of children: 2   • Years of education: Not on file   • Highest education level: Not on file   Occupational History   • Occupation:       Comment: [employer]   Tobacco Use   • Smoking status: Former Smoker     Years: 20.00     Types: Cigarettes     Last attempt to quit:      Years since quittin.5   • Smokeless tobacco: Never Used   Substance and Sexual Activity   • Alcohol use: No     Frequency: Never   • Drug use: No   • Sexual activity: Defer   Social History Narrative    Hobbies: walking          Procedures      Objective:       Physical Exam    /80   Pulse 56   Ht 172.7 cm (68\")   Wt 93.9 kg (207 lb)   BMI 31.47 kg/m²   The patient is alert, oriented and in no distress.    Vital signs as noted above.    Head and neck revealed no carotid bruits or jugular venous distension.  No thyromegaly or lymphadenopathy is present.    Lungs clear.  No wheezing.  Breath sounds are normal bilaterally.    Heart normal first and second heart sounds.  No murmur..  No pericardial rub is present.  No gallop is present.    Abdomen soft and nontender. "  No organomegaly is present.    Extremities revealed good peripheral pulses without any pedal edema.    Skin warm and dry.    Musculoskeletal system is grossly normal.    CNS grossly normal.

## 2020-07-14 NOTE — PROGRESS NOTES
Encounter Date:07/14/2020  Last seen 6/18/2020      Patient ID: Evaristo Barlow is a 69 y.o. male.    Chief Complaint:  Status post stent  Hypertension  Dyslipidemia  Tiredness and shortness of breath-new problem     History of Present Illness  Patient recently has been having tiredness and shortness of breath  Patient also has been having left shoulder and left side of the neck tingling sensation     Since I have last seen, the patient has been without any chest discomfort palpitations, dizziness or syncope.  Denies having any headache ,abdominal pain ,nausea, vomiting , diarrhea constipation, loss of weight or loss of appetite.  Denies having any excessive bruising ,hematuria or blood in the stool.     Review of all systems negative except as indicated     Assessment and Plan         [[[[[[[[[[[[[[[[[[[[[  Impression  ==============  -Shortness of breath and tiredness  Echocardiogram 7/14/2020  Mild aortic valve stenosis.  Gradient of 14 mmHg mean gradient 7 mmHg  Left ventricular size and contractility is normal with ejection fraction of 60%    Stress Cardiolite test-7/14/2020-abnormal with proximal inferior ischemia       -Tingling in the left side of the face and neck.      -status post stent to diagonal branch 06/21/2010 .    Stress Cardiolite test 06/15/2017 revealed reproducible chest discomfort 1.5 mm of inferior and anterolateral ST depression and inferior and apical moderate to significant ischemia.     Cardiac catheterization 06/21/2017 revealed normal left ventricular function diagonal branch stent was patent.  No significant disease was present. Please note RCA was engaged using 3D RC catheter.     - chest discomfort Extreme weakness and tiredness -Improved since lisinopril  was discontinued.      -hypertension and dyslipidemia     -recently diagnosed Solis's esophagitis      -strong family history of coronary artery disease      -former smoker      -allergy to  penicillin.  ================    Plan  ============  Shortness of breath and tiredness.  Tingling in the neck and left arm likely noncardiac.  Recent EKG showed sinus bradycardia without any ischemic changes  patient is off lisinopril  Continue Plavix and Imdur 60 mg a day and simvastatin 20 mg a day   blood pressure is better controlled-  Medications were reviewed and updated.  Patient recently has moved to [place].  Lexiscan Cardiolite test-abnormal as above with inferior ischemia  Echocardiogram-as above  Lab work including TSH comprehensive metabolic panel lipid panel magnesium level and CBC.-Reviewed and within normal range  Patient was advised left and right heart catheterization  @@(please note RCA was injected using 3D RCA catheter.)@@  Risks and benefits pros and cons of the procedure were discussed with patient.  Further plan will depend on patient's progress.  [[[[[[[[[[[[[[[[[[[[[[[[[[                        Diagnosis Plan   1. Abnormal nuclear stress test  Case Request Cath Lab: Left and Right Heart Cath with Coronary Angiography    CBC (No Diff)    Basic Metabolic Panel    Protime-INR    ECG 12 Lead    XR Chest 2 View   2. Shortness of breath  Case Request Cath Lab: Left and Right Heart Cath with Coronary Angiography    CBC (No Diff)    Basic Metabolic Panel    Protime-INR    ECG 12 Lead    XR Chest 2 View   3. Status post angioplasty with stent  Case Request Cath Lab: Left and Right Heart Cath with Coronary Angiography    CBC (No Diff)    Basic Metabolic Panel    Protime-INR    ECG 12 Lead    XR Chest 2 View   LAB RESULTS (LAST 7 DAYS)    CBC        BMP        CMP         BNP        TROPONIN        CoAg        Creatinine Clearance  Estimated Creatinine Clearance: 81 mL/min (by C-G formula based on SCr of 0.93 mg/dL).    ABG        Radiology  No radiology results for the last day                The following portions of the patient's history were reviewed and updated as appropriate: allergies,  current medications, past family history, past medical history, past social history, past surgical history and problem list.    Review of Systems   Constitution: Negative for malaise/fatigue.   Cardiovascular: Negative for chest pain, leg swelling, palpitations and syncope.   Respiratory: Negative for shortness of breath.    Skin: Negative for rash.   Gastrointestinal: Negative for nausea and vomiting.   Neurological: Negative for dizziness, light-headedness and numbness.         Current Outpatient Medications:   •  aspirin 325 MG tablet, Take 325 mg by mouth every night at bedtime., Disp: , Rfl:   •  calcium carbonate-vitamin d (CALCIUM 600+D) 600-400 MG-UNIT per tablet, Take  by mouth 2 (Two) Times a Day., Disp: , Rfl:   •  celecoxib (CeleBREX) 200 MG capsule, Take 1 capsule by mouth Daily., Disp: 30 capsule, Rfl: 2  •  clopidogrel (PLAVIX) 75 MG tablet, Take 1 tablet by mouth Daily., Disp: 90 tablet, Rfl: 2  •  coenzyme Q10 100 MG capsule, Take 100 mg by mouth Daily., Disp: , Rfl:   •  isosorbide mononitrate (IMDUR) 60 MG 24 hr tablet, TAKE 1 TABLET BY MOUTH ONCE DAILY IN THE MORNING, Disp: 90 tablet, Rfl: 3  •  magnesium oxide (MAGOX) 400 (241.3 Mg) MG tablet tablet, Take 400 mg by mouth every night at bedtime., Disp: , Rfl:   •  nitroglycerin (NITROSTAT) 0.4 MG SL tablet, Place 0.4 mg under the tongue Every 5 (Five) Minutes As Needed for Chest Pain. Take no more than 3 doses in 15 minutes., Disp: , Rfl:   •  omeprazole (priLOSEC) 40 MG capsule, Take 1 capsule by mouth Daily., Disp: 90 capsule, Rfl: 2  •  Potassium 99 MG tablet, Take 1 tablet by mouth Daily., Disp: , Rfl:   •  simvastatin (ZOCOR) 20 MG tablet, Take 1 tablet by mouth Daily., Disp: 90 tablet, Rfl: 2  No current facility-administered medications for this visit.     Allergies   Allergen Reactions   • Morphine Anaphylaxis       Family History   Problem Relation Age of Onset   • Heart failure Mother    • Heart failure Father    • Heart disease Brother  "   • Heart disease Brother    • Heart attack Brother    • No Known Problems Brother        Past Surgical History:   Procedure Laterality Date   • CORONARY ANGIOPLASTY WITH STENT PLACEMENT  2010   • UMBILICAL HERNIA REPAIR         Past Medical History:   Diagnosis Date   • Arthritis 2019   • Solis esophagus 2018   • Biliary dyskinesia 2019   • Coronary artery disease    • Diverticulosis 2018   • Gastric reflux 2019   • Hemorrhoids 2019   • Hyperlipidemia 2019   • Hypertension 2019       Family History   Problem Relation Age of Onset   • Heart failure Mother    • Heart failure Father    • Heart disease Brother    • Heart disease Brother    • Heart attack Brother    • No Known Problems Brother        Social History     Socioeconomic History   • Marital status:      Spouse name: Not on file   • Number of children: 2   • Years of education: Not on file   • Highest education level: Not on file   Occupational History   • Occupation:       Comment: [employer]   Tobacco Use   • Smoking status: Former Smoker     Years: 20.00     Types: Cigarettes     Last attempt to quit:      Years since quittin.5   • Smokeless tobacco: Never Used   Substance and Sexual Activity   • Alcohol use: No     Frequency: Never   • Drug use: No   • Sexual activity: Defer   Social History Narrative    Hobbies: walking          Procedures      Objective:       Physical Exam    /80   Pulse 56   Ht 172.7 cm (68\")   Wt 93.9 kg (207 lb)   BMI 31.47 kg/m²   The patient is alert, oriented and in no distress.    Vital signs as noted above.    Head and neck revealed no carotid bruits or jugular venous distension.  No thyromegaly or lymphadenopathy is present.    Lungs clear.  No wheezing.  Breath sounds are normal bilaterally.    Heart normal first and second heart sounds.  No murmur..  No pericardial rub is present.  No gallop is present.    Abdomen soft and nontender. "  No organomegaly is present.    Extremities revealed good peripheral pulses without any pedal edema.    Skin warm and dry.    Musculoskeletal system is grossly normal.    CNS grossly normal.

## 2020-07-15 ENCOUNTER — HOSPITAL ENCOUNTER (OUTPATIENT)
Dept: GENERAL RADIOLOGY | Facility: HOSPITAL | Age: 70
Discharge: HOME | End: 2020-07-15
Admitting: INTERNAL MEDICINE

## 2020-07-15 ENCOUNTER — LAB (OUTPATIENT)
Dept: LAB | Facility: HOSPITAL | Age: 70
End: 2020-07-15

## 2020-07-15 DIAGNOSIS — R06.02 SHORTNESS OF BREATH: ICD-10-CM

## 2020-07-15 DIAGNOSIS — Z95.820 STATUS POST ANGIOPLASTY WITH STENT: ICD-10-CM

## 2020-07-15 DIAGNOSIS — R94.39 ABNORMAL NUCLEAR STRESS TEST: ICD-10-CM

## 2020-07-15 LAB
ANION GAP SERPL CALCULATED.3IONS-SCNC: 14.2 MMOL/L (ref 5–15)
BUN SERPL-MCNC: 16 MG/DL (ref 8–23)
BUN/CREAT SERPL: 18.8 (ref 7–25)
CALCIUM SPEC-SCNC: 9.9 MG/DL (ref 8.6–10.5)
CHLORIDE SERPL-SCNC: 102 MMOL/L (ref 98–107)
CO2 SERPL-SCNC: 24.8 MMOL/L (ref 22–29)
CREAT SERPL-MCNC: 0.85 MG/DL (ref 0.76–1.27)
DEPRECATED RDW RBC AUTO: 40.2 FL (ref 37–54)
ERYTHROCYTE [DISTWIDTH] IN BLOOD BY AUTOMATED COUNT: 16.5 % (ref 12.3–15.4)
GFR SERPL CREATININE-BSD FRML MDRD: 89 ML/MIN/1.73
GLUCOSE SERPL-MCNC: 96 MG/DL (ref 65–99)
HCT VFR BLD AUTO: 39.7 % (ref 37.5–51)
HGB BLD-MCNC: 11.8 G/DL (ref 13–17.7)
INR PPP: 1.04 (ref 0.9–1.1)
MCH RBC QN AUTO: 20.8 PG (ref 26.6–33)
MCHC RBC AUTO-ENTMCNC: 29.7 G/DL (ref 31.5–35.7)
MCV RBC AUTO: 69.9 FL (ref 79–97)
PLATELET # BLD AUTO: 212 10*3/MM3 (ref 140–450)
POTASSIUM SERPL-SCNC: 4.2 MMOL/L (ref 3.5–5.2)
PROTHROMBIN TIME: 10.8 SECONDS (ref 9.6–11.7)
RBC # BLD AUTO: 5.68 10*6/MM3 (ref 4.14–5.8)
SODIUM SERPL-SCNC: 141 MMOL/L (ref 136–145)
WBC # BLD AUTO: 6.28 10*3/MM3 (ref 3.4–10.8)

## 2020-07-15 PROCEDURE — 80048 BASIC METABOLIC PNL TOTAL CA: CPT

## 2020-07-15 PROCEDURE — 36415 COLL VENOUS BLD VENIPUNCTURE: CPT

## 2020-07-15 PROCEDURE — 85027 COMPLETE CBC AUTOMATED: CPT

## 2020-07-15 PROCEDURE — U0004 COV-19 TEST NON-CDC HGH THRU: HCPCS | Performed by: INTERNAL MEDICINE

## 2020-07-15 PROCEDURE — C9803 HOPD COVID-19 SPEC COLLECT: HCPCS | Performed by: INTERNAL MEDICINE

## 2020-07-15 PROCEDURE — U0002 COVID-19 LAB TEST NON-CDC: HCPCS | Performed by: INTERNAL MEDICINE

## 2020-07-15 PROCEDURE — 71046 X-RAY EXAM CHEST 2 VIEWS: CPT

## 2020-07-15 PROCEDURE — 85610 PROTHROMBIN TIME: CPT

## 2020-07-16 LAB
REF LAB TEST METHOD: NORMAL
SARS-COV-2 RNA RESP QL NAA+PROBE: NOT DETECTED

## 2020-07-17 ENCOUNTER — HOSPITAL ENCOUNTER (OUTPATIENT)
Facility: HOSPITAL | Age: 70
Setting detail: HOSPITAL OUTPATIENT SURGERY
Discharge: HOME | End: 2020-07-17
Attending: INTERNAL MEDICINE | Admitting: INTERNAL MEDICINE

## 2020-07-17 VITALS
DIASTOLIC BLOOD PRESSURE: 75 MMHG | RESPIRATION RATE: 12 BRPM | SYSTOLIC BLOOD PRESSURE: 124 MMHG | HEART RATE: 63 BPM | WEIGHT: 204.37 LBS | TEMPERATURE: 97.2 F | BODY MASS INDEX: 30.97 KG/M2 | OXYGEN SATURATION: 96 % | HEIGHT: 68 IN

## 2020-07-17 DIAGNOSIS — R06.02 SHORTNESS OF BREATH: ICD-10-CM

## 2020-07-17 DIAGNOSIS — R94.39 ABNORMAL NUCLEAR STRESS TEST: ICD-10-CM

## 2020-07-17 DIAGNOSIS — Z95.820 STATUS POST ANGIOPLASTY WITH STENT: ICD-10-CM

## 2020-07-17 PROCEDURE — 25010000002 MIDAZOLAM PER 1 MG: Performed by: INTERNAL MEDICINE

## 2020-07-17 PROCEDURE — 0 IOPAMIDOL PER 1 ML: Performed by: INTERNAL MEDICINE

## 2020-07-17 PROCEDURE — 93460 R&L HRT ART/VENTRICLE ANGIO: CPT | Performed by: INTERNAL MEDICINE

## 2020-07-17 PROCEDURE — C1894 INTRO/SHEATH, NON-LASER: HCPCS | Performed by: INTERNAL MEDICINE

## 2020-07-17 PROCEDURE — 99153 MOD SED SAME PHYS/QHP EA: CPT | Performed by: INTERNAL MEDICINE

## 2020-07-17 PROCEDURE — C1769 GUIDE WIRE: HCPCS | Performed by: INTERNAL MEDICINE

## 2020-07-17 PROCEDURE — 99152 MOD SED SAME PHYS/QHP 5/>YRS: CPT | Performed by: INTERNAL MEDICINE

## 2020-07-17 PROCEDURE — 25010000002 FENTANYL CITRATE (PF) 100 MCG/2ML SOLUTION: Performed by: INTERNAL MEDICINE

## 2020-07-17 RX ORDER — DIPHENHYDRAMINE HCL 25 MG
25 TABLET ORAL EVERY 6 HOURS PRN
Status: DISCONTINUED | OUTPATIENT
Start: 2020-07-17 | End: 2020-07-17 | Stop reason: HOSPADM

## 2020-07-17 RX ORDER — ONDANSETRON 2 MG/ML
4 INJECTION INTRAMUSCULAR; INTRAVENOUS EVERY 6 HOURS PRN
Status: DISCONTINUED | OUTPATIENT
Start: 2020-07-17 | End: 2020-07-17 | Stop reason: HOSPADM

## 2020-07-17 RX ORDER — SIMVASTATIN 20 MG
20 TABLET ORAL NIGHTLY
COMMUNITY
End: 2020-10-23 | Stop reason: SDUPTHER

## 2020-07-17 RX ORDER — SODIUM CHLORIDE 9 MG/ML
250 INJECTION, SOLUTION INTRAVENOUS ONCE AS NEEDED
Status: DISCONTINUED | OUTPATIENT
Start: 2020-07-17 | End: 2020-07-17 | Stop reason: HOSPADM

## 2020-07-17 RX ORDER — ACETAMINOPHEN 325 MG/1
650 TABLET ORAL EVERY 4 HOURS PRN
Status: DISCONTINUED | OUTPATIENT
Start: 2020-07-17 | End: 2020-07-17 | Stop reason: HOSPADM

## 2020-07-17 RX ORDER — ISOSORBIDE MONONITRATE 60 MG/1
60 TABLET, EXTENDED RELEASE ORAL DAILY
COMMUNITY
End: 2021-08-25

## 2020-07-17 RX ORDER — FENTANYL CITRATE 50 UG/ML
INJECTION, SOLUTION INTRAMUSCULAR; INTRAVENOUS AS NEEDED
Status: DISCONTINUED | OUTPATIENT
Start: 2020-07-17 | End: 2020-07-17 | Stop reason: HOSPADM

## 2020-07-17 RX ORDER — SODIUM CHLORIDE 9 MG/ML
30 INJECTION, SOLUTION INTRAVENOUS CONTINUOUS
Status: DISCONTINUED | OUTPATIENT
Start: 2020-07-17 | End: 2020-07-17 | Stop reason: HOSPADM

## 2020-07-17 RX ORDER — MIDAZOLAM HYDROCHLORIDE 1 MG/ML
INJECTION INTRAMUSCULAR; INTRAVENOUS AS NEEDED
Status: DISCONTINUED | OUTPATIENT
Start: 2020-07-17 | End: 2020-07-17 | Stop reason: HOSPADM

## 2020-07-17 RX ORDER — ONDANSETRON 4 MG/1
4 TABLET, FILM COATED ORAL EVERY 6 HOURS PRN
Status: DISCONTINUED | OUTPATIENT
Start: 2020-07-17 | End: 2020-07-17 | Stop reason: HOSPADM

## 2020-07-17 RX ORDER — LIDOCAINE HYDROCHLORIDE 20 MG/ML
INJECTION, SOLUTION INFILTRATION; PERINEURAL AS NEEDED
Status: DISCONTINUED | OUTPATIENT
Start: 2020-07-17 | End: 2020-07-17 | Stop reason: HOSPADM

## 2020-07-17 RX ADMIN — SODIUM CHLORIDE 30 ML/HR: 900 INJECTION, SOLUTION INTRAVENOUS at 07:28

## 2020-07-17 NOTE — DISCHARGE INSTRUCTIONS
Post Cath Instructions    Call Dr. Hickey’s office to schedule a follow up appointment in 2 weeks at [phone].  Specific Physician Instructions:     1) Drink plenty of fluids for the next 24 hours.  This helps to eliminate the dye used in your procedure through urination.  You may resume a normal diet; however, try to avoid foods that would cause gas or constipation.    2) Sedative medication given to you during your catheterization may decrease your judgement and reaction time for up to 24-48 hours.  Therefore:  a. DO NOT drive or operate hazardous machinery (48 hours)  b. DO NOT consume alcoholic beverages  c. DO NOT make any important/legal decisions  d. Have someone stay with you for at least 24 hours    3) To allow proper healing and prevent bleeding, the following activities are to be strictly avoided for the next 24-48 hours:  a. Excessive bending at wound site  b. Straining (anything that would tense up muscles around the affected puncture site)  c. Lifting objects greater than 5 pounds, pushing, or pulling for 5 days    i. For Groin Cases:  1. Refrain from sexual activity  2. Refrain from running or vigorous walking  3. No prolonged sitting or standing  4. Limit stair climbing as much as possible    4) Keep the puncture site clean and dry.  You may remove the dressing tomorrow and replace it with a band-aid for at least one additional day.  Gently clean the site with mild soap and water.  No scrubbing/rubbing and lightly pat the area dry.  Showers are acceptable; however, avoid submerging in water (tub baths, hot tubs, swimming pools, dishwater, etc…) for at least one week.  The site should be completely healed before resuming these activities to reduce the risk of infection.  Check the site often.  Watch for signs and symptoms of infection and notify your physician if any of the following occur:  a. Bleeding or an increase in swelling at the puncture site  b. Fever  c. Increased soreness around puncture  site  d. Foul odor or significant drainage from the puncture site  e. Swelling, redness, or warmth at the puncture site    **A bruise or small “pea sized” lump under the skin at the puncture site is not unusual.  This should disappear within 3-4 weeks.**  5) CONTACT YOUR PHYSICIAN OR CALL 911 IF YOU EXPERIENCE ANY OF THE FOLLOWING:  a. Increased angina (chest pain) or frequent sensations of pressure, burning, pain, or other discomfort in the chest, arm, jaws, or stomach  b. Lightheadedness, dizziness, faint feeling, sweating, or difficulty breathing  c. Odd sensation changes like numbness, tingling, coldness, or pain in the arm or leg in which the catheter was inserted  d. Limb in which the catheter was inserted becomes pale/bluish in color    IMPORTANT:  Although this occurs very rarely, if you should develop bright red or excessive bleeding, feel a “pop” inside at the insertion site, or notice a sudden increase in swelling larger than a walnut, you should call 911.  Hold continuous firm pressure to the access site until emergency personnel arrive.  It is best if someone else can do this for you.

## 2020-08-05 ENCOUNTER — OFFICE VISIT (OUTPATIENT)
Dept: CARDIOLOGY | Facility: CLINIC | Age: 70
End: 2020-08-05

## 2020-08-05 VITALS
OXYGEN SATURATION: 98 % | BODY MASS INDEX: 31.37 KG/M2 | HEIGHT: 68 IN | WEIGHT: 207 LBS | SYSTOLIC BLOOD PRESSURE: 100 MMHG | DIASTOLIC BLOOD PRESSURE: 64 MMHG | HEART RATE: 74 BPM

## 2020-08-05 DIAGNOSIS — Z95.5 S/P PRIMARY ANGIOPLASTY WITH CORONARY STENT: ICD-10-CM

## 2020-08-05 DIAGNOSIS — I10 ESSENTIAL HYPERTENSION: Primary | ICD-10-CM

## 2020-08-05 DIAGNOSIS — E78.2 MIXED HYPERLIPIDEMIA: ICD-10-CM

## 2020-08-05 DIAGNOSIS — I25.10 CORONARY ARTERY DISEASE INVOLVING NATIVE CORONARY ARTERY OF NATIVE HEART WITHOUT ANGINA PECTORIS: ICD-10-CM

## 2020-08-05 PROCEDURE — 99214 OFFICE O/P EST MOD 30 MIN: CPT | Performed by: INTERNAL MEDICINE

## 2020-08-05 NOTE — PROGRESS NOTES
Encounter Date:08/05/2020  Last seen in the office on 14 2020      Patient ID: Evaristo Barlow is a 69 y.o. male.    Chief Complaint:  Status post stent  Hypertension  Dyslipidemia  Tiredness and shortness of breath-new problem     History of Present Illness  Patient recently has been having tiredness and shortness of breath  Patient also has been having left shoulder and left side of the neck tingling sensation     Since I have last seen, the patient has been without any chest discomfort palpitations, dizziness or syncope.  Denies having any headache ,abdominal pain ,nausea, vomiting , diarrhea constipation, loss of weight or loss of appetite.  Denies having any excessive bruising ,hematuria or blood in the stool.     Review of all systems negative except as indicated     Assessment and Plan         [[[[[[[[[[[[[[[[[[[[[  Impression  ==============  -Shortness of breath and tiredness-better    Echocardiogram 7/14/2020  Mild aortic valve stenosis.  Gradient of 14 mmHg mean gradient 7 mmHg  Left ventricular size and contractility is normal with ejection fraction of 60%     Stress Cardiolite test-7/14/2020-abnormal with proximal inferior ischemia    Cardiac catheterization 7/17/2020   No evidence for pulmonary hypertension is present.  Normal left ventricular size and contractility with ejection fraction of 60%.  Left main coronary artery is normal.  Left anterior descending artery normal.  Diagonal branch stent is patent with 50% disease in the midsegment of the stent.  Circumflex coronary artery normal.  Right coronary artery is a large and dominant vessel and is normal.     @@Please note RCA could be selectively engaged using JR4 catheter.  In the past 3D RC catheter was used.@@     -Tingling in the left side of the face and neck.      -status post stent to diagonal branch 06/21/2010 .    Stress Cardiolite test 06/15/2017 revealed reproducible chest discomfort 1.5 mm of inferior and anterolateral ST depression  and inferior and apical moderate to significant ischemia.     Cardiac catheterization 06/21/2017 revealed normal left ventricular function diagonal branch stent was patent.  No significant disease was present. Please note RCA was engaged using 3D RC catheter.     - chest discomfort Extreme weakness and tiredness -Improved since lisinopril  was discontinued.      -hypertension and dyslipidemia     -recently diagnosed Solis's esophagitis      -strong family history of coronary artery disease      -former smoker      -allergy to penicillin.  ================    Plan  ============  Shortness of breath and tiredness.-Better.  Recent cardiac catheterization results were discussed and educated patient.  Patient wants to take Viagra.  I have asked him to gradually wean off Imdur.  Patient is off lisinopril  Continue Plavix and simvastatin 20 mg a day   blood pressure is better controlled-  Medications were reviewed and updated.  Follow-up in the office in 6 months@@(please note RCA was injected using 3D RCA catheter.)@@  Further plan will depend on patient's progress.  [[[[[[[[[[[[[[[[[[[[[[[[[[                         Diagnosis Plan   1. Essential hypertension     2. S/P primary angioplasty with coronary stent     3. Mixed hyperlipidemia     4. Coronary artery disease involving native coronary artery of native heart without angina pectoris     LAB RESULTS (LAST 7 DAYS)    CBC        BMP        CMP         BNP        TROPONIN        CoAg        Creatinine Clearance  Estimated Creatinine Clearance: 88.6 mL/min (by C-G formula based on SCr of 0.85 mg/dL).    ABG        Radiology  No radiology results for the last day                The following portions of the patient's history were reviewed and updated as appropriate: allergies, current medications, past family history, past medical history, past social history, past surgical history and problem list.    Review of Systems   Constitution: Negative for malaise/fatigue.    Cardiovascular: Negative for chest pain, leg swelling, palpitations and syncope.   Respiratory: Negative for shortness of breath.    Skin: Negative for rash.   Gastrointestinal: Negative for nausea and vomiting.   Neurological: Negative for dizziness, light-headedness and numbness.         Current Outpatient Medications:   •  aspirin 325 MG tablet, Take 325 mg by mouth every night at bedtime., Disp: , Rfl:   •  calcium carbonate-vitamin d (CALCIUM 600+D) 600-400 MG-UNIT per tablet, Take 2 tablets by mouth 2 (Two) Times a Day., Disp: , Rfl:   •  clopidogrel (PLAVIX) 75 MG tablet, Take 1 tablet by mouth Daily., Disp: 90 tablet, Rfl: 2  •  coenzyme Q10 100 MG capsule, Take 100 mg by mouth Daily., Disp: , Rfl:   •  isosorbide mononitrate (IMDUR) 60 MG 24 hr tablet, Take 60 mg by mouth Daily., Disp: , Rfl:   •  magnesium oxide (MAGOX) 400 (241.3 Mg) MG tablet tablet, Take 400 mg by mouth every night at bedtime., Disp: , Rfl:   •  nitroglycerin (NITROSTAT) 0.4 MG SL tablet, Place 0.4 mg under the tongue Every 5 (Five) Minutes As Needed for Chest Pain. Take no more than 3 doses in 15 minutes., Disp: , Rfl:   •  omeprazole (priLOSEC) 40 MG capsule, Take 1 capsule by mouth Daily., Disp: 90 capsule, Rfl: 2  •  Potassium 99 MG tablet, Take 1 tablet by mouth Daily., Disp: , Rfl:   •  simvastatin (Zocor) 20 MG tablet, Take 20 mg by mouth Every Night., Disp: , Rfl:   •  Turmeric 500 MG capsule, Take 1,000 mg by mouth Daily., Disp: , Rfl:   •  celecoxib (CeleBREX) 200 MG capsule, Take 1 capsule by mouth Daily., Disp: 30 capsule, Rfl: 2    Allergies   Allergen Reactions   • Morphine Anaphylaxis       Family History   Problem Relation Age of Onset   • Heart failure Mother    • Heart failure Father    • Heart disease Brother    • Heart disease Brother    • Heart attack Brother    • No Known Problems Brother        Past Surgical History:   Procedure Laterality Date   • CARDIAC CATHETERIZATION     • CARDIAC CATHETERIZATION N/A  "2020    Procedure: Left and Right Heart Cath with Coronary Angiography;  Surgeon: Taila Hickey MD;  Location: [facility] CATH INVASIVE LOCATION;  Service: Cardiovascular;  Laterality: N/A;   • CORONARY ANGIOPLASTY WITH STENT PLACEMENT  2010   • UMBILICAL HERNIA REPAIR         Past Medical History:   Diagnosis Date   • Arthritis 2019   • Solis esophagus 2018   • Biliary dyskinesia 2019   • Coronary artery disease    • Diverticulosis 2018   • Gastric reflux 2019   • Hemorrhoids 2019   • Hyperlipidemia 2019   • Hypertension 2019       Family History   Problem Relation Age of Onset   • Heart failure Mother    • Heart failure Father    • Heart disease Brother    • Heart disease Brother    • Heart attack Brother    • No Known Problems Brother        Social History     Socioeconomic History   • Marital status:      Spouse name: Not on file   • Number of children: 2   • Years of education: Not on file   • Highest education level: Not on file   Occupational History   • Occupation:       Comment: [employer]   Tobacco Use   • Smoking status: Former Smoker     Years: 20.00     Types: Cigarettes     Last attempt to quit: 1978     Years since quittin.6   • Smokeless tobacco: Never Used   Substance and Sexual Activity   • Alcohol use: No     Frequency: Never   • Drug use: No   • Sexual activity: Defer   Social History Narrative    Hobbies: walking          Procedures      Objective:       Physical Exam    /64   Pulse 74   Ht 172.7 cm (68\")   Wt 93.9 kg (207 lb)   SpO2 98%   BMI 31.47 kg/m²   The patient is alert, oriented and in no distress.    Vital signs as noted above.    Head and neck revealed no carotid bruits or jugular venous distension.  No thyromegaly or lymphadenopathy is present.    Lungs clear.  No wheezing.  Breath sounds are normal bilaterally.    Heart normal first and second heart sounds.  No murmur..  No pericardial rub " is present.  No gallop is present.    Abdomen soft and nontender.  No organomegaly is present.    Extremities revealed good peripheral pulses without any pedal edema.    Skin warm and dry.    Musculoskeletal system is grossly normal.    CNS grossly normal.

## 2020-08-06 ENCOUNTER — TELEPHONE (OUTPATIENT)
Dept: FAMILY MEDICINE CLINIC | Facility: CLINIC | Age: 70
End: 2020-08-06

## 2020-08-06 NOTE — TELEPHONE ENCOUNTER
My mistake. The reason we havent done it in the past is because he is on imdur. I didn't want him to take viagra with that medicine.     Please have him follow up with urology

## 2020-08-06 NOTE — TELEPHONE ENCOUNTER
PATIENT STATE: that he would like to be back on viagra please advise     PATIENT CAN BE REACHED ON:[phone]    PHARMACY PREFERRED:[facility] - [place], [place] - [address] - [phone]  - [phone] FX

## 2020-08-07 NOTE — TELEPHONE ENCOUNTER
Patient called back and is requesting to be put on Viagra 100mg.  He recently saw Dr Hickey and was taken off the Imdur.  Therefore, he wants the Viagra to be sent in.

## 2020-08-17 ENCOUNTER — TELEPHONE (OUTPATIENT)
Dept: CARDIOLOGY | Facility: CLINIC | Age: 70
End: 2020-08-17

## 2020-10-23 RX ORDER — CLOPIDOGREL BISULFATE 75 MG/1
75 TABLET ORAL DAILY
Qty: 90 TABLET | Refills: 3 | Status: SHIPPED | OUTPATIENT
Start: 2020-10-23 | End: 2021-11-01 | Stop reason: SDUPTHER

## 2020-10-23 RX ORDER — SIMVASTATIN 20 MG
20 TABLET ORAL NIGHTLY
Qty: 90 TABLET | Refills: 3 | Status: SHIPPED | OUTPATIENT
Start: 2020-10-23 | End: 2021-12-13

## 2021-02-22 ENCOUNTER — OFFICE VISIT (OUTPATIENT)
Dept: CARDIOLOGY | Facility: CLINIC | Age: 71
End: 2021-02-22

## 2021-02-22 VITALS
DIASTOLIC BLOOD PRESSURE: 74 MMHG | BODY MASS INDEX: 30.88 KG/M2 | WEIGHT: 203.75 LBS | HEIGHT: 68 IN | HEART RATE: 62 BPM | SYSTOLIC BLOOD PRESSURE: 149 MMHG | TEMPERATURE: 97.1 F | OXYGEN SATURATION: 99 %

## 2021-02-22 DIAGNOSIS — E78.2 MIXED HYPERLIPIDEMIA: ICD-10-CM

## 2021-02-22 DIAGNOSIS — I10 ESSENTIAL HYPERTENSION: ICD-10-CM

## 2021-02-22 DIAGNOSIS — Z95.5 S/P PRIMARY ANGIOPLASTY WITH CORONARY STENT: Primary | ICD-10-CM

## 2021-02-22 DIAGNOSIS — R06.02 SHORTNESS OF BREATH: ICD-10-CM

## 2021-02-22 PROCEDURE — 93000 ELECTROCARDIOGRAM COMPLETE: CPT | Performed by: INTERNAL MEDICINE

## 2021-02-22 PROCEDURE — 99214 OFFICE O/P EST MOD 30 MIN: CPT | Performed by: INTERNAL MEDICINE

## 2021-02-22 NOTE — PROGRESS NOTES
Encounter Date:02/22/2021  Last seen 8/5/2020      Patient ID: Evaristo Barlow is a 70 y.o. male.    Chief Complaint:    Status post stent  Hypertension  Dyslipidemia       History of Present Illness  Since I have last seen, the patient has been without any chest discomfort ,shortness of breath, palpitations, dizziness or syncope.  Denies having any headache ,abdominal pain ,nausea, vomiting , diarrhea constipation, loss of weight or loss of appetite.  Denies having any excessive bruising ,hematuria or blood in the stool.    Review of all systems negative except as indicated.    Reviewed ROS.     Assessment and Plan         [[[[[[[[[[[[[[[[[[[[[  Impression  ==============  -Shortness of breath and tiredness-better     Echocardiogram 7/14/2020  Mild aortic valve stenosis.  Gradient of 14 mmHg mean gradient 7 mmHg  Left ventricular size and contractility is normal with ejection fraction of 60%     Stress Cardiolite test-7/14/2020-abnormal with proximal inferior ischemia     Cardiac catheterization 7/17/2020   No evidence for pulmonary hypertension is present.  Normal left ventricular size and contractility with ejection fraction of 60%.  Left main coronary artery is normal.  Left anterior descending artery normal.  Diagonal branch stent is patent with 50% disease in the midsegment of the stent.  Circumflex coronary artery normal.  Right coronary artery is a large and dominant vessel and is normal.     @@Please note RCA could be selectively engaged using JR4 catheter.  In the past 3D RC catheter was used.@@     -Tingling in the left side of the face and neck.      -status post stent to diagonal branch 06/21/2010 .    Stress Cardiolite test 06/15/2017 revealed reproducible chest discomfort 1.5 mm of inferior and anterolateral ST depression and inferior and apical moderate to significant ischemia.     Cardiac catheterization 06/21/2017 revealed normal left ventricular function diagonal branch stent was patent.  No  significant disease was present. Please note RCA was engaged using 3D RC catheter.     - chest discomfort Extreme weakness and tiredness -Improved since lisinopril  was discontinued.      -hypertension and dyslipidemia     -recently diagnosed Solis's esophagitis      -strong family history of coronary artery disease      -former smoker      -allergy to penicillin.  ================    Plan  ============  Status post stent  Patient is not having any angina pectoris or congestive heart failure.  EKG showed sinus rhythm without any ischemic changes  @@(please note RCA was injected using 3D RCA catheter.)@@    Shortness of breath and tiredness.-Better.  Recent cardiac catheterization results were discussed and educated patient.  Patient wants to take Viagra.  Patient is off Imdur and lisinopril.  Continue Plavix and simvastatin 20 mg a day    Hypertension-149/74.    Dyslipidemia-continue simvastatin    Medications were reviewed and updated.    Follow-up in the office in 6 months  Further plan will depend on patient's progress.  [[[[[[[[[[[[[[[[[[[[[[[[[[                 Diagnosis Plan   1. S/P primary angioplasty with coronary stent  ECG 12 Lead   2. Mixed hyperlipidemia  ECG 12 Lead   3. Essential hypertension  ECG 12 Lead   4. Shortness of breath  ECG 12 Lead   LAB RESULTS (LAST 7 DAYS)    CBC        BMP        CMP         BNP        TROPONIN        CoAg        Creatinine Clearance  CrCl cannot be calculated (Patient's most recent lab result is older than the maximum 30 days allowed.).    ABG        Radiology  No radiology results for the last day                The following portions of the patient's history were reviewed and updated as appropriate: allergies, current medications, past family history, past medical history, past social history, past surgical history and problem list.    Review of Systems   Constitution: Negative for malaise/fatigue.   Cardiovascular: Negative for chest pain, leg swelling,  palpitations and syncope.   Respiratory: Negative for shortness of breath.    Skin: Negative for rash.   Gastrointestinal: Negative for nausea and vomiting.   Neurological: Negative for dizziness, light-headedness and numbness.         Current Outpatient Medications:   •  aspirin 325 MG tablet, Take 325 mg by mouth every night at bedtime., Disp: , Rfl:   •  calcium carbonate-vitamin d (CALCIUM 600+D) 600-400 MG-UNIT per tablet, Take 2 tablets by mouth 2 (Two) Times a Day., Disp: , Rfl:   •  clopidogrel (Plavix) 75 MG tablet, Take 1 tablet by mouth Daily., Disp: 90 tablet, Rfl: 3  •  magnesium oxide (MAGOX) 400 (241.3 Mg) MG tablet tablet, Take 400 mg by mouth every night at bedtime., Disp: , Rfl:   •  nitroglycerin (NITROSTAT) 0.4 MG SL tablet, Place 0.4 mg under the tongue Every 5 (Five) Minutes As Needed for Chest Pain. Take no more than 3 doses in 15 minutes., Disp: , Rfl:   •  Potassium 99 MG tablet, Take 1 tablet by mouth Daily., Disp: , Rfl:   •  simvastatin (Zocor) 20 MG tablet, Take 1 tablet by mouth Every Night., Disp: 90 tablet, Rfl: 3  •  celecoxib (CeleBREX) 200 MG capsule, Take 1 capsule by mouth Daily., Disp: 30 capsule, Rfl: 2  •  coenzyme Q10 100 MG capsule, Take 100 mg by mouth Daily., Disp: , Rfl:   •  isosorbide mononitrate (IMDUR) 60 MG 24 hr tablet, Take 60 mg by mouth Daily., Disp: , Rfl:   •  omeprazole (priLOSEC) 40 MG capsule, Take 1 capsule by mouth Daily., Disp: 90 capsule, Rfl: 2  •  Turmeric 500 MG capsule, Take 1,000 mg by mouth Daily., Disp: , Rfl:     Allergies   Allergen Reactions   • Morphine Anaphylaxis       Family History   Problem Relation Age of Onset   • Heart failure Mother    • Heart failure Father    • Heart disease Brother    • Heart disease Brother    • Heart attack Brother    • No Known Problems Brother        Past Surgical History:   Procedure Laterality Date   • CARDIAC CATHETERIZATION     • CARDIAC CATHETERIZATION N/A 7/17/2020    Procedure: Left and Right Heart Cath  "with Coronary Angiography;  Surgeon: Talia Hickey MD;  Location: [facility] INVASIVE LOCATION;  Service: Cardiovascular;  Laterality: N/A;   • CORONARY ANGIOPLASTY WITH STENT PLACEMENT  2010   • UMBILICAL HERNIA REPAIR         Past Medical History:   Diagnosis Date   • Arthritis 2019   • Solis esophagus 2018   • Biliary dyskinesia 2019   • Coronary artery disease    • Diverticulosis 2018   • Gastric reflux 2019   • Hemorrhoids 2019   • Hyperlipidemia 2019   • Hypertension 2019       Family History   Problem Relation Age of Onset   • Heart failure Mother    • Heart failure Father    • Heart disease Brother    • Heart disease Brother    • Heart attack Brother    • No Known Problems Brother        Social History     Socioeconomic History   • Marital status:      Spouse name: Not on file   • Number of children: 2   • Years of education: Not on file   • Highest education level: Not on file   Occupational History   • Occupation:       Comment: [employer]   Tobacco Use   • Smoking status: Former Smoker     Years: 20.00     Types: Cigarettes     Quit date:      Years since quittin.1   • Smokeless tobacco: Never Used   Substance and Sexual Activity   • Alcohol use: No     Frequency: Never   • Drug use: No   • Sexual activity: Defer   Social History Narrative    Hobbies: walking            ECG 12 Lead    Date/Time: 2021 3:30 PM  Performed by: Talia Hickey MD  Authorized by: Talia Hickey MD   Comparison: compared with previous ECG   Similar to previous ECG  Comparison to previous ECG: Sinus bradycardia nonspecific ST-T wave changes normal axis normal intervals no ectopy no significant change from 2020                Objective:       Physical Exam    /74   Pulse 62   Temp 97.1 °F (36.2 °C)   Ht 172.7 cm (68\")   Wt 92.4 kg (203 lb 12 oz)   SpO2 99%   BMI 30.98 kg/m²   The patient is alert, oriented and in no " distress.    Vital signs as noted above.    Head and neck revealed no carotid bruits or jugular venous distension.  No thyromegaly or lymphadenopathy is present.    Lungs clear.  No wheezing.  Breath sounds are normal bilaterally.    Heart normal first and second heart sounds.  No murmur..  No pericardial rub is present.  No gallop is present.    Abdomen soft and nontender.  No organomegaly is present.    Extremities revealed good peripheral pulses without any pedal edema.    Skin warm and dry.    Musculoskeletal system is grossly normal.    CNS grossly normal.    Reviewed and unchanged from last visit.

## 2021-08-25 ENCOUNTER — OFFICE VISIT (OUTPATIENT)
Dept: CARDIOLOGY | Facility: CLINIC | Age: 71
End: 2021-08-25

## 2021-08-25 VITALS
WEIGHT: 214 LBS | OXYGEN SATURATION: 97 % | HEIGHT: 68 IN | BODY MASS INDEX: 32.43 KG/M2 | HEART RATE: 62 BPM | SYSTOLIC BLOOD PRESSURE: 179 MMHG | DIASTOLIC BLOOD PRESSURE: 92 MMHG

## 2021-08-25 DIAGNOSIS — Z95.820 STATUS POST ANGIOPLASTY WITH STENT: ICD-10-CM

## 2021-08-25 DIAGNOSIS — E78.2 MIXED HYPERLIPIDEMIA: ICD-10-CM

## 2021-08-25 DIAGNOSIS — Z95.5 S/P PRIMARY ANGIOPLASTY WITH CORONARY STENT: Primary | ICD-10-CM

## 2021-08-25 PROCEDURE — 93000 ELECTROCARDIOGRAM COMPLETE: CPT | Performed by: INTERNAL MEDICINE

## 2021-08-25 PROCEDURE — 99214 OFFICE O/P EST MOD 30 MIN: CPT | Performed by: INTERNAL MEDICINE

## 2021-08-25 NOTE — PROGRESS NOTES
Encounter Date:08/25/2021  Last seen 2/22/2021      Patient ID: Evaristo Barlow is a 70 y.o. male.    Chief Complaint:  Status post stent  Hypertension  Dyslipidemia        History of Present Illness  Since I have last seen, the patient has been without any chest discomfort ,shortness of breath, palpitations, dizziness or syncope.  Denies having any headache ,abdominal pain ,nausea, vomiting , diarrhea constipation, loss of weight or loss of appetite.  Denies having any excessive bruising ,hematuria or blood in the stool.    Review of all systems negative except as indicated.    Reviewed ROS.  Assessment and Plan         [[[[[[[[[[[[[[[[[[[[[  Impression  ==============  -Shortness of breath and tiredness-better     Echocardiogram 7/14/2020  Mild aortic valve stenosis.  Gradient of 14 mmHg mean gradient 7 mmHg  Left ventricular size and contractility is normal with ejection fraction of 60%     Stress Cardiolite test-7/14/2020-abnormal with proximal inferior ischemia     Cardiac catheterization 7/17/2020   No evidence for pulmonary hypertension is present.  Normal left ventricular size and contractility with ejection fraction of 60%.  Left main coronary artery is normal.  Left anterior descending artery normal.  Diagonal branch stent is patent with 50% disease in the midsegment of the stent.  Circumflex coronary artery normal.  Right coronary artery is a large and dominant vessel and is normal.     @@Please note RCA could be selectively engaged using JR4 catheter.  In the past 3D RC catheter was used.@@     -Tingling in the left side of the face and neck.      -status post stent to diagonal branch 06/21/2010 .    Stress Cardiolite test 06/15/2017 revealed reproducible chest discomfort 1.5 mm of inferior and anterolateral ST depression and inferior and apical moderate to significant ischemia.     Cardiac catheterization 06/21/2017 revealed normal left ventricular function diagonal branch stent was patent.  No  significant disease was present. Please note RCA was engaged using 3D RC catheter.     - chest discomfort Extreme weakness and tiredness -Improved since lisinopril  was discontinued.      -hypertension and dyslipidemia     -recently diagnosed Solis's esophagitis      -strong family history of coronary artery disease      -former smoker      -allergy to penicillin.  ================    Plan  ============  Status post stent  Patient is not having any angina pectoris or congestive heart failure.  EKG showed sinus rhythm without any ischemic changes  @@(please note RCA was injected using 3D RCA catheter.)@@     Hypertension-179/92.  Patient was started on lisinopril 5 mg a day    Dyslipidemia-continue simvastatin     Medications were reviewed and updated.     Follow-up in the office in 6 months  Further plan will depend on patient's progress.  [[[[[[[[[[[[[[[[[[[[[[[[[[                            Diagnosis Plan   1. S/P primary angioplasty with coronary stent     2. Mixed hyperlipidemia     3. Status post angioplasty with stent     LAB RESULTS (LAST 7 DAYS)    CBC        BMP        CMP         BNP        TROPONIN        CoAg        Creatinine Clearance  CrCl cannot be calculated (Patient's most recent lab result is older than the maximum 30 days allowed.).    ABG        Radiology  No radiology results for the last day                The following portions of the patient's history were reviewed and updated as appropriate: allergies, current medications, past family history, past medical history, past social history, past surgical history and problem list.    Review of Systems   Constitutional: Negative for malaise/fatigue.   Cardiovascular: Negative for chest pain, leg swelling, palpitations and syncope.   Respiratory: Negative for shortness of breath.    Skin: Negative for rash.   Gastrointestinal: Negative for nausea and vomiting.   Neurological: Negative for dizziness, light-headedness and numbness.         Current  Outpatient Medications:   •  aspirin 325 MG tablet, Take 325 mg by mouth every night at bedtime., Disp: , Rfl:   •  calcium carbonate-vitamin d (CALCIUM 600+D) 600-400 MG-UNIT per tablet, Take 1 tablet by mouth 2 (Two) Times a Day., Disp: , Rfl:   •  celecoxib (CeleBREX) 200 MG capsule, Take 1 capsule by mouth Daily., Disp: 30 capsule, Rfl: 2  •  clopidogrel (Plavix) 75 MG tablet, Take 1 tablet by mouth Daily., Disp: 90 tablet, Rfl: 3  •  magnesium oxide (MAGOX) 400 (241.3 Mg) MG tablet tablet, Take 400 mg by mouth every night at bedtime., Disp: , Rfl:   •  nitroglycerin (NITROSTAT) 0.4 MG SL tablet, Place 0.4 mg under the tongue Every 5 (Five) Minutes As Needed for Chest Pain. Take no more than 3 doses in 15 minutes., Disp: , Rfl:   •  Potassium 99 MG tablet, Take 1 tablet by mouth Daily., Disp: , Rfl:   •  simvastatin (Zocor) 20 MG tablet, Take 1 tablet by mouth Every Night., Disp: 90 tablet, Rfl: 3  •  coenzyme Q10 100 MG capsule, Take 100 mg by mouth Daily., Disp: , Rfl:   •  omeprazole (priLOSEC) 40 MG capsule, Take 1 capsule by mouth Daily., Disp: 90 capsule, Rfl: 2  •  Turmeric 500 MG capsule, Take 1,000 mg by mouth Daily., Disp: , Rfl:     Allergies   Allergen Reactions   • Morphine Anaphylaxis       Family History   Problem Relation Age of Onset   • Heart failure Mother    • Heart failure Father    • Heart disease Brother    • Heart disease Brother    • Heart attack Brother    • No Known Problems Brother        Past Surgical History:   Procedure Laterality Date   • CARDIAC CATHETERIZATION     • CARDIAC CATHETERIZATION N/A 7/17/2020    Procedure: Left and Right Heart Cath with Coronary Angiography;  Surgeon: Talia Hickey MD;  Location: [facility] CATH INVASIVE LOCATION;  Service: Cardiovascular;  Laterality: N/A;   • CORONARY ANGIOPLASTY WITH STENT PLACEMENT  06/21/2010   • UMBILICAL HERNIA REPAIR         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   •  "Biliary dyskinesia 2019   • Coronary artery disease    • Diverticulosis 2018   • Gastric reflux 2019   • Hemorrhoids 2019   • Hyperlipidemia 2019   • Hypertension 2019       Family History   Problem Relation Age of Onset   • Heart failure Mother    • Heart failure Father    • Heart disease Brother    • Heart disease Brother    • Heart attack Brother    • No Known Problems Brother        Social History     Socioeconomic History   • Marital status:      Spouse name: Not on file   • Number of children: 2   • Years of education: Not on file   • Highest education level: Not on file   Tobacco Use   • Smoking status: Former Smoker     Years: 20.00     Types: Cigarettes     Quit date:      Years since quittin.6   • Smokeless tobacco: Never Used   Substance and Sexual Activity   • Alcohol use: No   • Drug use: No   • Sexual activity: Defer           ECG 12 Lead    Date/Time: 2021 3:01 PM  Performed by: Talia Hickey MD  Authorized by: Talia Hickey MD   Comparison: compared with previous ECG   Similar to previous ECG  Comparison to previous ECG: Normal sinus rhythm nonspecific ST-T wave changes 60/min normal axis normal intervals no ectopy no significant change from 2021                Objective:       Physical Exam    /92   Pulse 62   Ht 172.7 cm (68\")   Wt 97.1 kg (214 lb)   SpO2 97%   BMI 32.54 kg/m²   The patient is alert, oriented and in no distress.    Vital signs as noted above.    Head and neck revealed no carotid bruits or jugular venous distension.  No thyromegaly or lymphadenopathy is present.    Lungs clear.  No wheezing.  Breath sounds are normal bilaterally.    Heart normal first and second heart sounds.  No murmur..  No pericardial rub is present.  No gallop is present.    Abdomen soft and nontender.  No organomegaly is present.    Extremities revealed good peripheral pulses without any pedal edema.    Skin warm and " dry.    Musculoskeletal system is grossly normal.    CNS grossly normal.    Reviewed and unchanged from last visit.

## 2021-11-01 RX ORDER — CLOPIDOGREL BISULFATE 75 MG/1
75 TABLET ORAL DAILY
Qty: 90 TABLET | Refills: 3 | Status: SHIPPED | OUTPATIENT
Start: 2021-11-01 | End: 2022-01-05 | Stop reason: SDUPTHER

## 2021-11-01 RX ORDER — LISINOPRIL 5 MG/1
5 TABLET ORAL DAILY
Qty: 90 TABLET | Refills: 3 | Status: SHIPPED | OUTPATIENT
Start: 2021-11-01 | End: 2022-01-05 | Stop reason: SDUPTHER

## 2021-11-01 RX ORDER — LISINOPRIL 5 MG/1
5 TABLET ORAL DAILY
Qty: 90 TABLET | Refills: 3 | Status: SHIPPED | OUTPATIENT
Start: 2021-11-01 | End: 2021-11-01 | Stop reason: SDUPTHER

## 2021-11-01 RX ORDER — CLOPIDOGREL BISULFATE 75 MG/1
75 TABLET ORAL DAILY
Qty: 90 TABLET | Refills: 3 | Status: SHIPPED | OUTPATIENT
Start: 2021-11-01 | End: 2021-11-01 | Stop reason: SDUPTHER

## 2021-11-01 NOTE — TELEPHONE ENCOUNTER
Rx Refill Note  Requested Prescriptions      No prescriptions requested or ordered in this encounter      Last office visit with prescribing clinician: 8/25/2021      Next office visit with prescribing clinician: 3/2/2022            Lachelle Bishop MA  11/01/21, 13:11 EDT       *are you okay with filling the Lisinopril 5 mg for the patient? It is documented in 08/25/2021 office visit that he was started on the medication, but it doesn't state who started him on it.

## 2021-11-01 NOTE — TELEPHONE ENCOUNTER
Rx Refill Note  Requested Prescriptions     Pending Prescriptions Disp Refills   • lisinopril (PRINIVIL,ZESTRIL) 5 MG tablet 90 tablet 3     Sig: Take 1 tablet by mouth Daily.   • clopidogrel (Plavix) 75 MG tablet 90 tablet 3     Sig: Take 1 tablet by mouth Daily.      Last office visit with prescribing clinician: 8/25/2021      Next office visit with prescribing clinician: 3/2/2022            Sophy Franks MA  11/01/21, 15:56 EDT

## 2021-11-01 NOTE — TELEPHONE ENCOUNTER
Incoming Refill Request      Medication requested (name and dose): Lisinopril 5 MG  Clopidogrel 75 MG    Pharmacy where request should be sent: [facility]    Additional details provided by patient: DR. ALVARADO DID NOT PUT HIM ON THE LISINOPRIL BUT TOLD PATIENT IT WAS OK TO CONTINUE TO TAKE AT LAST APT.     Best call back number: [phone]    Does the patient have less than a 3 day supply:  [x] Yes  [] No    Sidra Henson Rep  11/01/21, 13:03 EDT

## 2021-12-13 RX ORDER — SIMVASTATIN 20 MG
TABLET ORAL
Qty: 90 TABLET | Refills: 3 | Status: SHIPPED | OUTPATIENT
Start: 2021-12-13 | End: 2022-01-05 | Stop reason: SDUPTHER

## 2021-12-13 NOTE — TELEPHONE ENCOUNTER
Rx Refill Note  Requested Prescriptions     Pending Prescriptions Disp Refills   • simvastatin (ZOCOR) 20 MG tablet [Pharmacy Med Name: SIMVASTATIN 20 MG TABLET] 90 tablet 3     Sig: TAKE ONE TABLET BY MOUTH ONCE NIGHTLY      Last office visit with prescribing clinician: 8/25/2021      Next office visit with prescribing clinician: 3/2/2022     No recent labs       Thi Alamo MA  12/13/21, 08:41 EST

## 2022-01-05 RX ORDER — SIMVASTATIN 20 MG
20 TABLET ORAL NIGHTLY
Qty: 90 TABLET | Refills: 3 | Status: SHIPPED | OUTPATIENT
Start: 2022-01-05 | End: 2022-11-18 | Stop reason: SDUPTHER

## 2022-01-05 RX ORDER — CLOPIDOGREL BISULFATE 75 MG/1
75 TABLET ORAL DAILY
Qty: 90 TABLET | Refills: 3 | Status: SHIPPED | OUTPATIENT
Start: 2022-01-05 | End: 2022-11-18 | Stop reason: SDUPTHER

## 2022-01-05 RX ORDER — LISINOPRIL 5 MG/1
5 TABLET ORAL DAILY
Qty: 90 TABLET | Refills: 3 | Status: SHIPPED | OUTPATIENT
Start: 2022-01-05 | End: 2022-11-18 | Stop reason: SDUPTHER

## 2022-01-05 NOTE — TELEPHONE ENCOUNTER
Rx Refill Note  Requested Prescriptions     Pending Prescriptions Disp Refills   • lisinopril (PRINIVIL,ZESTRIL) 5 MG tablet 90 tablet 3     Sig: Take 1 tablet by mouth Daily.   • clopidogrel (Plavix) 75 MG tablet 90 tablet 3     Sig: Take 1 tablet by mouth Daily.      Last office visit with prescribing clinician: 8/25/2021      Next office visit with prescribing clinician: 3/2/2022            Carolyn Simon MA  01/05/22, 09:25 EST   Rx Refill Note  Requested Prescriptions     Pending Prescriptions Disp Refills   • lisinopril (PRINIVIL,ZESTRIL) 5 MG tablet 90 tablet 3     Sig: Take 1 tablet by mouth Daily.   • clopidogrel (Plavix) 75 MG tablet 90 tablet 3     Sig: Take 1 tablet by mouth Daily.   • simvastatin (ZOCOR) 20 MG tablet 90 tablet 3     Sig: Take 1 tablet by mouth Every Night.      Last office visit with prescribing clinician: 8/25/2021      Next office visit with prescribing clinician: 3/2/2022            Carolyn Simon MA  01/05/22, 09:26 ESTRx Refill Note  Requested Prescriptions     Pending Prescriptions Disp Refills   • lisinopril (PRINIVIL,ZESTRIL) 5 MG tablet 90 tablet 3     Sig: Take 1 tablet by mouth Daily.      Last office visit with prescribing clinician: 8/25/2021      Next office visit with prescribing clinician: 3/2/2022            Carolyn Simon MA  01/05/22, 09:25 EST

## 2022-03-02 ENCOUNTER — OFFICE VISIT (OUTPATIENT)
Dept: CARDIOLOGY | Facility: CLINIC | Age: 72
End: 2022-03-02

## 2022-03-02 VITALS
OXYGEN SATURATION: 96 % | HEART RATE: 69 BPM | HEIGHT: 68 IN | DIASTOLIC BLOOD PRESSURE: 95 MMHG | SYSTOLIC BLOOD PRESSURE: 145 MMHG | WEIGHT: 214 LBS | BODY MASS INDEX: 32.43 KG/M2

## 2022-03-02 DIAGNOSIS — Z95.820 STATUS POST ANGIOPLASTY WITH STENT: ICD-10-CM

## 2022-03-02 DIAGNOSIS — R53.83 TIREDNESS: ICD-10-CM

## 2022-03-02 DIAGNOSIS — I10 ESSENTIAL HYPERTENSION: ICD-10-CM

## 2022-03-02 DIAGNOSIS — E78.2 MIXED HYPERLIPIDEMIA: ICD-10-CM

## 2022-03-02 DIAGNOSIS — Z95.5 S/P PRIMARY ANGIOPLASTY WITH CORONARY STENT: Primary | ICD-10-CM

## 2022-03-02 DIAGNOSIS — R06.02 SHORTNESS OF BREATH: ICD-10-CM

## 2022-03-02 PROCEDURE — 93000 ELECTROCARDIOGRAM COMPLETE: CPT | Performed by: INTERNAL MEDICINE

## 2022-03-02 PROCEDURE — 99214 OFFICE O/P EST MOD 30 MIN: CPT | Performed by: INTERNAL MEDICINE

## 2022-03-02 NOTE — PROGRESS NOTES
Encounter Date:03/02/2022  Last seen 8/25/2021      Patient ID: Evaristo Barlow is a 71 y.o. male.    Chief Complaint:    Status post stent  Hypertension  Dyslipidemia        History of Present Illness  Chronic tiredness.  Since I have last seen, the patient has been without any chest discomfort ,shortness of breath, palpitations, dizziness or syncope.  Denies having any headache ,abdominal pain ,nausea, vomiting , diarrhea constipation, loss of weight or loss of appetite.  Denies having any excessive bruising ,hematuria or blood in the stool.    Review of all systems negative except as indicated.    Reviewed ROS..  Assessment and Plan         [[[[[[[[[[[[[[[[[[[[[  Impression  ==============  -Shortness of breath and tiredness-better     Echocardiogram 7/14/2020  Mild aortic valve stenosis.  Gradient of 14 mmHg mean gradient 7 mmHg  Left ventricular size and contractility is normal with ejection fraction of 60%     Stress Cardiolite test-7/14/2020-abnormal with proximal inferior ischemia     Cardiac catheterization 7/17/2020   No evidence for pulmonary hypertension is present.  Normal left ventricular size and contractility with ejection fraction of 60%.  Left main coronary artery is normal.  Left anterior descending artery normal.  Diagonal branch stent is patent with 50% disease in the midsegment of the stent.  Circumflex coronary artery normal.  Right coronary artery is a large and dominant vessel and is normal.     @@Please note RCA could be selectively engaged using JR4 catheter.  In the past 3D RC catheter was used.@@     -Tingling in the left side of the face and neck.      -status post stent to diagonal branch 06/21/2010 .    Stress Cardiolite test 06/15/2017 revealed reproducible chest discomfort 1.5 mm of inferior and anterolateral ST depression and inferior and apical moderate to significant ischemia.     Cardiac catheterization 06/21/2017 revealed normal left ventricular function diagonal branch  stent was patent.  No significant disease was present. Please note RCA was engaged using 3D RC catheter.     - chest discomfort Extreme weakness and tiredness -Improved since lisinopril  was discontinued.      -hypertension and dyslipidemia     -recently diagnosed Solis's esophagitis      -strong family history of coronary artery disease      -former smoker      -allergy to penicillin.  ================    Plan  ============  Status post stent  Patient is not having any angina pectoris or congestive heart failure.  EKG showed sinus rhythm without any ischemic changes-3/2/2022  @@(please note RCA was injected using 3D RCA catheter.)@@     Hypertension-145/95  Continue lisinopril 5 mg a day     Dyslipidemia-continue simvastatin     Medications were reviewed and updated.     Follow-up in the office in 6 months    Further plan will depend on patient's progress.  [[[[[[[[[[[[[[[[[[[[[[[[[[                        Diagnosis Plan   1. S/P primary angioplasty with coronary stent     2. Mixed hyperlipidemia     3. Status post angioplasty with stent     4. Essential hypertension     5. Shortness of breath     6. Tiredness     LAB RESULTS (LAST 7 DAYS)    CBC        BMP        CMP         BNP        TROPONIN        CoAg        Creatinine Clearance  CrCl cannot be calculated (Patient's most recent lab result is older than the maximum 30 days allowed.).    ABG        Radiology  No radiology results for the last day                The following portions of the patient's history were reviewed and updated as appropriate: allergies, current medications, past family history, past medical history, past social history, past surgical history and problem list.    Review of Systems   Constitutional: Negative for malaise/fatigue.   Cardiovascular: Negative for chest pain, leg swelling, palpitations and syncope.   Respiratory: Negative for shortness of breath.    Skin: Negative for rash.   Gastrointestinal: Negative for nausea and  vomiting.   Neurological: Negative for dizziness, light-headedness and numbness.   All other systems reviewed and are negative.        Current Outpatient Medications:   •  celecoxib (CeleBREX) 200 MG capsule, Take 1 capsule by mouth Daily., Disp: 30 capsule, Rfl: 2  •  clopidogrel (Plavix) 75 MG tablet, Take 1 tablet by mouth Daily., Disp: 90 tablet, Rfl: 3  •  lisinopril (PRINIVIL,ZESTRIL) 5 MG tablet, Take 1 tablet by mouth Daily., Disp: 90 tablet, Rfl: 3  •  magnesium oxide (MAGOX) 400 (241.3 Mg) MG tablet tablet, Take 400 mg by mouth every night at bedtime., Disp: , Rfl:   •  nitroglycerin (NITROSTAT) 0.4 MG SL tablet, Place 0.4 mg under the tongue Every 5 (Five) Minutes As Needed for Chest Pain. Take no more than 3 doses in 15 minutes., Disp: , Rfl:   •  Potassium 99 MG tablet, Take 1 tablet by mouth Daily., Disp: , Rfl:   •  simvastatin (ZOCOR) 20 MG tablet, Take 1 tablet by mouth Every Night., Disp: 90 tablet, Rfl: 3  •  aspirin 325 MG tablet, Take 325 mg by mouth every night at bedtime., Disp: , Rfl:   •  calcium carbonate-vitamin d (CALCIUM 600+D) 600-400 MG-UNIT per tablet, Take 1 tablet by mouth 2 (Two) Times a Day., Disp: , Rfl:   •  coenzyme Q10 100 MG capsule, Take 100 mg by mouth Daily., Disp: , Rfl:   •  omeprazole (priLOSEC) 40 MG capsule, Take 1 capsule by mouth Daily., Disp: 90 capsule, Rfl: 2  •  Turmeric 500 MG capsule, Take 1,000 mg by mouth Daily., Disp: , Rfl:     Allergies   Allergen Reactions   • Morphine Anaphylaxis       Family History   Problem Relation Age of Onset   • Heart failure Mother    • Heart failure Father    • Heart disease Brother    • Heart disease Brother    • Heart attack Brother    • No Known Problems Brother        Past Surgical History:   Procedure Laterality Date   • CARDIAC CATHETERIZATION     • CARDIAC CATHETERIZATION N/A 7/17/2020    Procedure: Left and Right Heart Cath with Coronary Angiography;  Surgeon: Talia Hickey MD;  Location: [facility] CATH INVASIVE  "LOCATION;  Service: Cardiovascular;  Laterality: N/A;   • CORONARY ANGIOPLASTY WITH STENT PLACEMENT  2010   • UMBILICAL HERNIA REPAIR         Past Medical History:   Diagnosis Date   • Arthritis 2019   • Solis esophagus 2018   • Biliary dyskinesia 2019   • Coronary artery disease    • Diverticulosis 2018   • Gastric reflux 2019   • Hemorrhoids 2019   • Hyperlipidemia 2019   • Hypertension 2019       Family History   Problem Relation Age of Onset   • Heart failure Mother    • Heart failure Father    • Heart disease Brother    • Heart disease Brother    • Heart attack Brother    • No Known Problems Brother        Social History     Socioeconomic History   • Marital status:    • Number of children: 2   Tobacco Use   • Smoking status: Former Smoker     Years: 20.00     Types: Cigarettes     Quit date:      Years since quittin.1   • Smokeless tobacco: Never Used   Vaping Use   • Vaping Use: Never used   Substance and Sexual Activity   • Alcohol use: No   • Drug use: No   • Sexual activity: Defer           ECG 12 Lead    Date/Time: 3/2/2022 9:14 AM  Performed by: Talia Hickey MD  Authorized by: Talia Hickey MD   Comparison: compared with previous ECG   Similar to previous ECG  Comparison to previous ECG: Normal sinus rhythm nonspecific ST-T wave changes 65/min normal axis normal intervals no ectopy no change from 2021                Objective:       Physical Exam    /95 (BP Location: Left arm, Patient Position: Sitting, Cuff Size: Adult)   Pulse 69   Ht 172.7 cm (68\")   Wt 97.1 kg (214 lb)   SpO2 96%   BMI 32.54 kg/m²   The patient is alert, oriented and in no distress.    Vital signs as noted above.    Head and neck revealed no carotid bruits or jugular venous distension.  No thyromegaly or lymphadenopathy is present.    Lungs clear.  No wheezing.  Breath sounds are normal bilaterally.    Heart normal first and second heart sounds.  " No murmur..  No pericardial rub is present.  No gallop is present.    Abdomen soft and nontender.  No organomegaly is present.    Extremities revealed good peripheral pulses without any pedal edema.    Skin warm and dry.    Musculoskeletal system is grossly normal.    CNS grossly normal.    Reviewed and updated.

## 2022-09-02 NOTE — PROGRESS NOTES
Encounter Date:09/12/2022  Last seen 3/2/2022      Patient ID: Evaristo Barlow is a 71 y.o. male.    Chief Complaint:  Status post stent  Hypertension  Dyslipidemia        History of Present Illness  Chronic fatigue.    Since I have last seen, the patient has been without any chest discomfort ,shortness of breath, palpitations, dizziness or syncope.  Denies having any headache ,abdominal pain ,nausea, vomiting , diarrhea constipation, loss of weight or loss of appetite.  Denies having any excessive bruising ,hematuria or blood in the stool.    Review of all systems negative except as indicated.    Reviewed ROS.    Assessment and Plan         [[[[[[[[[[[[[[[[[[[[[  Impression  ==============  -Shortness of breath and tiredness-better     Echocardiogram 7/14/2020  Mild aortic valve stenosis.  Gradient of 14 mmHg mean gradient 7 mmHg  Left ventricular size and contractility is normal with ejection fraction of 60%     Stress Cardiolite test-7/14/2020-abnormal with proximal inferior ischemia     Cardiac catheterization 7/17/2020   No evidence for pulmonary hypertension is present.  Normal left ventricular size and contractility with ejection fraction of 60%.  Left main coronary artery is normal.  Left anterior descending artery normal.  Diagonal branch stent is patent with 50% disease in the midsegment of the stent.  Circumflex coronary artery normal.  Right coronary artery is a large and dominant vessel and is normal.     @@Please note RCA could be selectively engaged using JR4 catheter.  In the past 3D RC catheter was used.@@     -Tingling in the left side of the face and neck.      -status post stent to diagonal branch 06/21/2010 .    Stress Cardiolite test 06/15/2017 revealed reproducible chest discomfort 1.5 mm of inferior and anterolateral ST depression and inferior and apical moderate to significant ischemia.     Cardiac catheterization 06/21/2017 revealed normal left ventricular function diagonal branch stent  was patent.  No significant disease was present. Please note RCA was engaged using 3D RC catheter.     - chest discomfort Extreme weakness and tiredness -Improved since lisinopril  was discontinued.      -hypertension and dyslipidemia     -recently diagnosed Solis's esophagitis      -strong family history of coronary artery disease      -former smoker      -allergy to penicillin.  ================    Plan  ============  Status post stent  Patient is not having any angina pectoris or congestive heart failure.  EKG showed sinus bradycardia without any ischemic changes- 9/12/2022  @@(please note RCA was injected using 3D RCA catheter.)@@     Hypertension- well-controlled.  122/72  Continue lisinopril 5 mg a day    Chronic fatigue-not sure of the exact etiology.     Dyslipidemia-continue simvastatin     Medications were reviewed and updated.     Follow-up in the office in 6 months.     Further plan will depend on patient's progress.  [[[[[[[[[[[[[[[[[[[[[[[[[[                          Diagnosis Plan   1. S/P primary angioplasty with coronary stent     2. Mixed hyperlipidemia     3. Status post angioplasty with stent     4. Essential hypertension     5. Shortness of breath     6. Tiredness     LAB RESULTS (LAST 7 DAYS)    CBC        BMP        CMP         BNP        TROPONIN        CoAg        Creatinine Clearance  CrCl cannot be calculated (Patient's most recent lab result is older than the maximum 30 days allowed.).    ABG        Radiology  No radiology results for the last day                The following portions of the patient's history were reviewed and updated as appropriate: allergies, current medications, past family history, past medical history, past social history, past surgical history and problem list.    Review of Systems   Constitutional: Negative for malaise/fatigue.   Cardiovascular: Negative for chest pain, leg swelling, palpitations and syncope.   Respiratory: Negative for shortness of breath.   "  Skin: Negative for rash.   Gastrointestinal: Negative for nausea and vomiting.   Neurological: Negative for dizziness, light-headedness and numbness.   All other systems reviewed and are negative.        Current Outpatient Medications:   •  celecoxib (CeleBREX) 200 MG capsule, Take 1 capsule by mouth Daily., Disp: 30 capsule, Rfl: 2  •  clopidogrel (Plavix) 75 MG tablet, Take 1 tablet by mouth Daily., Disp: 90 tablet, Rfl: 3  •  lisinopril (PRINIVIL,ZESTRIL) 5 MG tablet, Take 1 tablet by mouth Daily., Disp: 90 tablet, Rfl: 3  •  magnesium oxide (MAGOX) 400 (241.3 Mg) MG tablet tablet, Take 400 mg by mouth every night at bedtime., Disp: , Rfl:   •  nitroglycerin (NITROSTAT) 0.4 MG SL tablet, Place 0.4 mg under the tongue Every 5 (Five) Minutes As Needed for Chest Pain. Take no more than 3 doses in 15 minutes., Disp: , Rfl:   •  Potassium 99 MG tablet, Take 1 tablet by mouth Daily., Disp: , Rfl:   •  simvastatin (ZOCOR) 20 MG tablet, Take 1 tablet by mouth Every Night., Disp: 90 tablet, Rfl: 3  •  aspirin 325 MG tablet, Take 325 mg by mouth every night at bedtime., Disp: , Rfl:   •  omeprazole (priLOSEC) 40 MG capsule, Take 1 capsule by mouth Daily., Disp: 90 capsule, Rfl: 2  •  Turmeric 500 MG capsule, Take 1,000 mg by mouth Daily., Disp: , Rfl:     Allergies   Allergen Reactions   • Morphine Anaphylaxis     Patient states not allergic, had a reaction one time while in the hospital.  \"been 11 years ago\"       Family History   Problem Relation Age of Onset   • Heart failure Mother    • Heart failure Father    • Heart disease Brother    • Heart disease Brother    • Heart attack Brother    • No Known Problems Brother        Past Surgical History:   Procedure Laterality Date   • CARDIAC CATHETERIZATION     • CARDIAC CATHETERIZATION N/A 7/17/2020    Procedure: Left and Right Heart Cath with Coronary Angiography;  Surgeon: Talia Hickey MD;  Location: [facility] CATH INVASIVE LOCATION;  Service: Cardiovascular;  " "Laterality: N/A;   • CORONARY ANGIOPLASTY WITH STENT PLACEMENT  2010   • UMBILICAL HERNIA REPAIR         Past Medical History:   Diagnosis Date   • Arthritis 2019   • Solis esophagus 2018   • Biliary dyskinesia 2019   • Coronary artery disease    • Diverticulosis 2018   • Gastric reflux 2019   • Hemorrhoids 2019   • Hyperlipidemia 2019   • Hypertension 2019       Family History   Problem Relation Age of Onset   • Heart failure Mother    • Heart failure Father    • Heart disease Brother    • Heart disease Brother    • Heart attack Brother    • No Known Problems Brother        Social History     Socioeconomic History   • Marital status:    • Number of children: 2   Tobacco Use   • Smoking status: Former Smoker     Years: 20.00     Types: Cigarettes     Quit date:      Years since quittin.7   • Smokeless tobacco: Never Used   Vaping Use   • Vaping Use: Never used   Substance and Sexual Activity   • Alcohol use: No   • Drug use: No   • Sexual activity: Defer           ECG 12 Lead    Date/Time: 2022 10:40 AM  Performed by: Talia Hickey MD  Authorized by: Talia Hickey MD   Comparison: compared with previous ECG   Similar to previous ECG  Comparison to previous ECG: Sinus bradycardia 53/min nonspecific ST-T wave changes normal axis normal intervals no ectopy no significant change from 3/2/2022                Objective:       Physical Exam    /72 (BP Location: Left arm, Patient Position: Sitting, Cuff Size: Large Adult)   Pulse 53   Ht 172.7 cm (68\")   Wt 91.6 kg (202 lb)   SpO2 98%   BMI 30.71 kg/m²   The patient is alert, oriented and in no distress.    Vital signs as noted above.    Head and neck revealed no carotid bruits or jugular venous distension.  No thyromegaly or lymphadenopathy is present.    Lungs clear.  No wheezing.  Breath sounds are normal bilaterally.    Heart normal first and second heart sounds.  No murmur..  No " pericardial rub is present.  No gallop is present.    Abdomen soft and nontender.  No organomegaly is present.    Extremities revealed good peripheral pulses without any pedal edema.    Skin warm and dry.    Musculoskeletal system is grossly normal.    CNS grossly normal.    Reviewed and updated.

## 2022-09-12 ENCOUNTER — OFFICE VISIT (OUTPATIENT)
Dept: CARDIOLOGY | Facility: CLINIC | Age: 72
End: 2022-09-12

## 2022-09-12 VITALS
SYSTOLIC BLOOD PRESSURE: 122 MMHG | BODY MASS INDEX: 30.62 KG/M2 | OXYGEN SATURATION: 98 % | HEART RATE: 53 BPM | DIASTOLIC BLOOD PRESSURE: 72 MMHG | WEIGHT: 202 LBS | HEIGHT: 68 IN

## 2022-09-12 DIAGNOSIS — R06.02 SHORTNESS OF BREATH: ICD-10-CM

## 2022-09-12 DIAGNOSIS — Z95.5 S/P PRIMARY ANGIOPLASTY WITH CORONARY STENT: Primary | ICD-10-CM

## 2022-09-12 DIAGNOSIS — I10 ESSENTIAL HYPERTENSION: ICD-10-CM

## 2022-09-12 DIAGNOSIS — Z95.820 STATUS POST ANGIOPLASTY WITH STENT: ICD-10-CM

## 2022-09-12 DIAGNOSIS — R53.83 TIREDNESS: ICD-10-CM

## 2022-09-12 DIAGNOSIS — E78.2 MIXED HYPERLIPIDEMIA: ICD-10-CM

## 2022-09-12 PROCEDURE — 93000 ELECTROCARDIOGRAM COMPLETE: CPT | Performed by: INTERNAL MEDICINE

## 2022-09-12 PROCEDURE — 99214 OFFICE O/P EST MOD 30 MIN: CPT | Performed by: INTERNAL MEDICINE

## 2022-11-14 ENCOUNTER — TELEPHONE (OUTPATIENT)
Dept: FAMILY MEDICINE CLINIC | Facility: CLINIC | Age: 72
End: 2022-11-14

## 2022-11-14 NOTE — TELEPHONE ENCOUNTER
Caller: Evaristo Barlow    Relationship: Self    Best call back number: [phone]    Who is your current provider: DR FABY GARCIA    Who would you like your new provider to be: DR RICARDO TRACY    What are your reasons for transferring care: NOT HAPPY, HAS ASKED FOR APPOINTMENTS AND REFERRALS, NONE HAVE BEEN GIVEN.    Additional notes: PLEASE ADVISE

## 2022-11-16 NOTE — TELEPHONE ENCOUNTER
Pt notified and advised. Transferred to make an apt. He is aware that Dr. Rashid is currently not accepting new patients and advised that the other provider s in office are booked out currently until about Feb-March. He still wants to make an apt in office and will call back to cancel if he finds somewhere else sooner.

## 2022-11-18 RX ORDER — CLOPIDOGREL BISULFATE 75 MG/1
75 TABLET ORAL DAILY
Qty: 90 TABLET | Refills: 3 | Status: SHIPPED | OUTPATIENT
Start: 2022-11-18

## 2022-11-18 RX ORDER — SIMVASTATIN 20 MG
20 TABLET ORAL NIGHTLY
Qty: 90 TABLET | Refills: 3 | Status: SHIPPED | OUTPATIENT
Start: 2022-11-18

## 2022-11-18 RX ORDER — LISINOPRIL 5 MG/1
5 TABLET ORAL DAILY
Qty: 90 TABLET | Refills: 3 | Status: SHIPPED | OUTPATIENT
Start: 2022-11-18

## 2022-11-18 NOTE — TELEPHONE ENCOUNTER
Rx Refill Note  Requested Prescriptions     Pending Prescriptions Disp Refills   • clopidogrel (Plavix) 75 MG tablet 90 tablet 3     Sig: Take 1 tablet by mouth Daily.   • lisinopril (PRINIVIL,ZESTRIL) 5 MG tablet 90 tablet 3     Sig: Take 1 tablet by mouth Daily.   • simvastatin (ZOCOR) 20 MG tablet 90 tablet 3     Sig: Take 1 tablet by mouth Every Night.      Last office visit with prescribing clinician: 9/12/2022      Next office visit with prescribing clinician: 3/27/2023            Sophy Franks MA  11/18/22, 08:35 EST

## 2023-02-21 ENCOUNTER — PATIENT MESSAGE (OUTPATIENT)
Dept: FAMILY MEDICINE CLINIC | Facility: CLINIC | Age: 73
End: 2023-02-21

## 2023-02-21 ENCOUNTER — OFFICE VISIT (OUTPATIENT)
Dept: FAMILY MEDICINE CLINIC | Facility: CLINIC | Age: 73
End: 2023-02-21
Payer: MEDICARE

## 2023-02-21 ENCOUNTER — LAB (OUTPATIENT)
Dept: FAMILY MEDICINE CLINIC | Facility: CLINIC | Age: 73
End: 2023-02-21
Payer: MEDICARE

## 2023-02-21 VITALS
SYSTOLIC BLOOD PRESSURE: 151 MMHG | HEART RATE: 63 BPM | DIASTOLIC BLOOD PRESSURE: 84 MMHG | TEMPERATURE: 97.5 F | BODY MASS INDEX: 30.29 KG/M2 | WEIGHT: 199.2 LBS | OXYGEN SATURATION: 98 %

## 2023-02-21 DIAGNOSIS — R31.9 HEMATURIA, UNSPECIFIED TYPE: ICD-10-CM

## 2023-02-21 DIAGNOSIS — I10 PRIMARY HYPERTENSION: Primary | Chronic | ICD-10-CM

## 2023-02-21 DIAGNOSIS — Z12.5 ENCOUNTER FOR SCREENING FOR MALIGNANT NEOPLASM OF PROSTATE: ICD-10-CM

## 2023-02-21 DIAGNOSIS — K22.70 BARRETT'S ESOPHAGUS WITHOUT DYSPLASIA: ICD-10-CM

## 2023-02-21 DIAGNOSIS — M19.90 ARTHRITIS: Chronic | ICD-10-CM

## 2023-02-21 DIAGNOSIS — R19.5 ABNORMAL STOOLS: ICD-10-CM

## 2023-02-21 DIAGNOSIS — E78.2 MIXED HYPERLIPIDEMIA: Chronic | ICD-10-CM

## 2023-02-21 LAB
ALBUMIN SERPL-MCNC: 4.7 G/DL (ref 3.5–5.2)
ALBUMIN/GLOB SERPL: 2.2 G/DL
ALP SERPL-CCNC: 64 U/L (ref 39–117)
ALT SERPL W P-5'-P-CCNC: 17 U/L (ref 1–41)
ANION GAP SERPL CALCULATED.3IONS-SCNC: 5.5 MMOL/L (ref 5–15)
AST SERPL-CCNC: 18 U/L (ref 1–40)
BASOPHILS # BLD AUTO: 0.02 10*3/MM3 (ref 0–0.2)
BASOPHILS NFR BLD AUTO: 0.5 % (ref 0–1.5)
BILIRUB SERPL-MCNC: 1.4 MG/DL (ref 0–1.2)
BILIRUB UR QL STRIP: NEGATIVE
BUN SERPL-MCNC: 18 MG/DL (ref 8–23)
BUN/CREAT SERPL: 22.8 (ref 7–25)
CALCIUM SPEC-SCNC: 10 MG/DL (ref 8.6–10.5)
CHLORIDE SERPL-SCNC: 105 MMOL/L (ref 98–107)
CHOLEST SERPL-MCNC: 133 MG/DL (ref 0–200)
CLARITY UR: CLEAR
CO2 SERPL-SCNC: 30.5 MMOL/L (ref 22–29)
COLOR UR: YELLOW
CREAT SERPL-MCNC: 0.79 MG/DL (ref 0.76–1.27)
DEPRECATED RDW RBC AUTO: 40.3 FL (ref 37–54)
EGFRCR SERPLBLD CKD-EPI 2021: 93.2 ML/MIN/1.73
EOSINOPHIL # BLD AUTO: 0.04 10*3/MM3 (ref 0–0.4)
EOSINOPHIL NFR BLD AUTO: 0.9 % (ref 0.3–6.2)
ERYTHROCYTE [DISTWIDTH] IN BLOOD BY AUTOMATED COUNT: 12.7 % (ref 12.3–15.4)
GLOBULIN UR ELPH-MCNC: 2.1 GM/DL
GLUCOSE SERPL-MCNC: 99 MG/DL (ref 65–99)
GLUCOSE UR STRIP-MCNC: NEGATIVE MG/DL
HCT VFR BLD AUTO: 48.2 % (ref 37.5–51)
HDLC SERPL-MCNC: 36 MG/DL (ref 40–60)
HGB BLD-MCNC: 16.2 G/DL (ref 13–17.7)
HGB UR QL STRIP.AUTO: NEGATIVE
IMM GRANULOCYTES # BLD AUTO: 0.01 10*3/MM3 (ref 0–0.05)
IMM GRANULOCYTES NFR BLD AUTO: 0.2 % (ref 0–0.5)
KETONES UR QL STRIP: NEGATIVE
LDLC SERPL CALC-MCNC: 80 MG/DL (ref 0–100)
LDLC/HDLC SERPL: 2.21 {RATIO}
LEUKOCYTE ESTERASE UR QL STRIP.AUTO: NEGATIVE
LYMPHOCYTES # BLD AUTO: 1 10*3/MM3 (ref 0.7–3.1)
LYMPHOCYTES NFR BLD AUTO: 23 % (ref 19.6–45.3)
MCH RBC QN AUTO: 29.3 PG (ref 26.6–33)
MCHC RBC AUTO-ENTMCNC: 33.6 G/DL (ref 31.5–35.7)
MCV RBC AUTO: 87.3 FL (ref 79–97)
MONOCYTES # BLD AUTO: 0.41 10*3/MM3 (ref 0.1–0.9)
MONOCYTES NFR BLD AUTO: 9.4 % (ref 5–12)
NEUTROPHILS NFR BLD AUTO: 2.87 10*3/MM3 (ref 1.7–7)
NEUTROPHILS NFR BLD AUTO: 66 % (ref 42.7–76)
NITRITE UR QL STRIP: NEGATIVE
NRBC BLD AUTO-RTO: 0 /100 WBC (ref 0–0.2)
PH UR STRIP.AUTO: 5.5 [PH] (ref 5–8)
PLATELET # BLD AUTO: 129 10*3/MM3 (ref 140–450)
PMV BLD AUTO: 12.2 FL (ref 6–12)
POTASSIUM SERPL-SCNC: 4.5 MMOL/L (ref 3.5–5.2)
PROT SERPL-MCNC: 6.8 G/DL (ref 6–8.5)
PROT UR QL STRIP: NEGATIVE
PSA SERPL-MCNC: 0.73 NG/ML (ref 0–4)
RBC # BLD AUTO: 5.52 10*6/MM3 (ref 4.14–5.8)
SODIUM SERPL-SCNC: 141 MMOL/L (ref 136–145)
SP GR UR STRIP: 1.02 (ref 1–1.03)
TRIGL SERPL-MCNC: 87 MG/DL (ref 0–150)
UROBILINOGEN UR QL STRIP: NORMAL
VLDLC SERPL-MCNC: 17 MG/DL (ref 5–40)
WBC NRBC COR # BLD: 4.35 10*3/MM3 (ref 3.4–10.8)

## 2023-02-21 PROCEDURE — 80053 COMPREHEN METABOLIC PANEL: CPT | Performed by: FAMILY MEDICINE

## 2023-02-21 PROCEDURE — 99214 OFFICE O/P EST MOD 30 MIN: CPT | Performed by: FAMILY MEDICINE

## 2023-02-21 PROCEDURE — 80061 LIPID PANEL: CPT | Performed by: FAMILY MEDICINE

## 2023-02-21 PROCEDURE — G0103 PSA SCREENING: HCPCS | Performed by: FAMILY MEDICINE

## 2023-02-21 PROCEDURE — 81003 URINALYSIS AUTO W/O SCOPE: CPT | Performed by: FAMILY MEDICINE

## 2023-02-21 PROCEDURE — 85025 COMPLETE CBC W/AUTO DIFF WBC: CPT | Performed by: FAMILY MEDICINE

## 2023-02-21 PROCEDURE — 36415 COLL VENOUS BLD VENIPUNCTURE: CPT | Performed by: FAMILY MEDICINE

## 2023-02-21 NOTE — PROGRESS NOTES
Subjective   Evaristo Barlow is a 72 y.o. male.     History of Present Illness  Evaristo Barlow is in for follow up on his chronic health issues with high blood pressure high cholesterol.  He also chronically is on treatment for arthritis and sees cardiology routinely for heart disease issues and gastroenterology for Solis's esophagus.  He is not new to our group but he has not been seen by any of our primary care providers for over 3 years, therefore he is new to this office and this is his first visit here today. There is no history of chest pain or dyspnea. He is having episodes of straining to have bowel movements and will see some blood in his urine when that happens.  There is no history of dizziness or lightheadedness. There is no history of issue with sleep or mood. He has some concerns about cognition, as his mother had dementia in her 90s, though he has not had any signs of yet.  There is no history of issue with present medication.            /84 (BP Location: Left arm, Patient Position: Sitting, Cuff Size: Large Adult)   Pulse 63   Temp 97.5 °F (36.4 °C) (Temporal)   Wt 90.4 kg (199 lb 3.2 oz)   SpO2 98%   BMI 30.29 kg/m²       Chief Complaint   Patient presents with   • Med Refill     New Patient to get Est. - Late night laying in bed it was 118/62 - he did walk here and got stuck in some traffic BP a little            Current Outpatient Medications:   •  aspirin 325 MG tablet, Take 325 mg by mouth every night at bedtime., Disp: , Rfl:   •  celecoxib (CeleBREX) 200 MG capsule, Take 1 capsule by mouth Daily., Disp: 30 capsule, Rfl: 2  •  clopidogrel (Plavix) 75 MG tablet, Take 1 tablet by mouth Daily., Disp: 90 tablet, Rfl: 3  •  lisinopril (PRINIVIL,ZESTRIL) 5 MG tablet, Take 1 tablet by mouth Daily., Disp: 90 tablet, Rfl: 3  •  nitroglycerin (NITROSTAT) 0.4 MG SL tablet, Place 0.4 mg under the tongue Every 5 (Five) Minutes As Needed for Chest Pain. Take no more than 3 doses in 15  minutes., Disp: , Rfl:   •  simvastatin (ZOCOR) 20 MG tablet, Take 1 tablet by mouth Every Night., Disp: 90 tablet, Rfl: 3  •  magnesium oxide (MAGOX) 400 (241.3 Mg) MG tablet tablet, Take 400 mg by mouth every night at bedtime., Disp: , Rfl:   •  omeprazole (priLOSEC) 40 MG capsule, Take 1 capsule by mouth Daily., Disp: 90 capsule, Rfl: 2  •  Potassium 99 MG tablet, Take 1 tablet by mouth Daily., Disp: , Rfl:   •  Turmeric 500 MG capsule, Take 1,000 mg by mouth Daily., Disp: , Rfl:         The following portions of the patient's history were reviewed and updated as appropriate: allergies, current medications, past family history, past medical history, past social history, past surgical history, and problem list.    Review of Systems   Constitutional: Negative for activity change, fatigue and fever.   HENT: Positive for trouble swallowing. Negative for congestion, sinus pressure, sinus pain and sore throat.    Eyes: Negative for visual disturbance.   Respiratory: Negative for chest tightness, shortness of breath and wheezing.    Cardiovascular: Negative for chest pain.   Gastrointestinal: Positive for constipation. Negative for abdominal distention, abdominal pain, diarrhea, nausea and vomiting.   Genitourinary: Positive for hematuria. Negative for difficulty urinating and dysuria.   Musculoskeletal: Positive for arthralgias. Negative for back pain and neck pain.   Psychiatric/Behavioral: Negative for agitation, hallucinations and suicidal ideas. The patient is nervous/anxious.        Objective   Physical Exam  Vitals and nursing note reviewed.   Constitutional:       Appearance: Normal appearance.   HENT:      Right Ear: Tympanic membrane and ear canal normal.      Left Ear: Tympanic membrane and ear canal normal.   Cardiovascular:      Rate and Rhythm: Normal rate and regular rhythm.      Heart sounds: Normal heart sounds. No murmur heard.  Pulmonary:      Effort: Pulmonary effort is normal.      Breath sounds: No  wheezing or rales.   Abdominal:      General: Bowel sounds are normal.      Palpations: Abdomen is soft.      Tenderness: There is no abdominal tenderness. There is no guarding.   Musculoskeletal:      Cervical back: Neck supple.      Right lower leg: No edema.      Left lower leg: No edema.   Lymphadenopathy:      Cervical: No cervical adenopathy.   Neurological:      General: No focal deficit present.      Mental Status: He is alert and oriented to person, place, and time.   Psychiatric:         Mood and Affect: Mood normal.           Assessment & Plan   Problems Addressed this Visit        Cardiac and Vasculature    Hypertension - Primary (Chronic)    Relevant Orders    Comprehensive metabolic panel    Lipid panel    Hyperlipidemia (Chronic)    Relevant Orders    Comprehensive metabolic panel    Lipid panel       Gastrointestinal Abdominal     Solis esophagus (Chronic)    Relevant Orders    CBC w AUTO Differential       Musculoskeletal and Injuries    Arthritis (Chronic)   Other Visit Diagnoses     Encounter for screening for malignant neoplasm of prostate        Relevant Orders    PSA SCREENING    Hematuria, unspecified type        Relevant Orders    Urinalysis With Culture If Indicated -    Ambulatory Referral to Urology (Completed)    Abnormal stools        Relevant Orders    Ambulatory Referral to Gastroenterology (Completed)      Diagnoses       Codes Comments    Primary hypertension    -  Primary ICD-10-CM: I10  ICD-9-CM: 401.9     Mixed hyperlipidemia     ICD-10-CM: E78.2  ICD-9-CM: 272.2     Arthritis     ICD-10-CM: M19.90  ICD-9-CM: 716.90     Solis's esophagus without dysplasia     ICD-10-CM: K22.70  ICD-9-CM: 530.85     Encounter for screening for malignant neoplasm of prostate     ICD-10-CM: Z12.5  ICD-9-CM: V76.44     Hematuria, unspecified type     ICD-10-CM: R31.9  ICD-9-CM: 599.70     Abnormal stools     ICD-10-CM: R19.5  ICD-9-CM: 787.7         I will get some labs today and establish a  baseline but I will also attempt to get old records to compare the labs to see if there is any change of note  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  I have asked him to find ways to stay physically active such as his exercise routine he currently does with his wife  I have asked him to continue monitoring his diet as he has lost 15 pounds in the last year and seems to be doing a good job  I am referring him back to his gastroenterologist because of a change in stool pattern and because of his history of Solis's esophagus and lack of endoscopy recently  I will follow along with his cardiologist  I will refer to a local urologist for the mention of some hematuria that he has noticed  He is to call with new concerns and I would like to see him again in about 6 months and do a Medicare wellness visit with him  Vaccinations seem to be up-to-date and I will confirm that as I get his old records

## 2023-02-27 ENCOUNTER — PATIENT ROUNDING (BHMG ONLY) (OUTPATIENT)
Dept: FAMILY MEDICINE CLINIC | Facility: CLINIC | Age: 73
End: 2023-02-27
Payer: MEDICARE

## 2023-02-27 ENCOUNTER — PATIENT MESSAGE (OUTPATIENT)
Dept: FAMILY MEDICINE CLINIC | Facility: CLINIC | Age: 73
End: 2023-02-27

## 2023-02-27 NOTE — PROGRESS NOTES
A My-Chart message has been sent to the patient for PATIENT ROUNDING with [facility].

## 2023-02-28 ENCOUNTER — PATIENT MESSAGE (OUTPATIENT)
Dept: FAMILY MEDICINE CLINIC | Facility: CLINIC | Age: 73
End: 2023-02-28

## 2023-02-28 RX ORDER — SILDENAFIL 100 MG/1
100 TABLET, FILM COATED ORAL DAILY PRN
Qty: 10 TABLET | Refills: 2 | Status: SHIPPED | OUTPATIENT
Start: 2023-02-28 | End: 2023-03-06 | Stop reason: SDUPTHER

## 2023-03-06 ENCOUNTER — PATIENT MESSAGE (OUTPATIENT)
Dept: FAMILY MEDICINE CLINIC | Facility: CLINIC | Age: 73
End: 2023-03-06

## 2023-03-06 RX ORDER — SILDENAFIL 100 MG/1
100 TABLET, FILM COATED ORAL DAILY PRN
Qty: 10 TABLET | Refills: 2 | Status: SHIPPED | OUTPATIENT
Start: 2023-03-06

## 2023-03-09 ENCOUNTER — OFFICE (OUTPATIENT)
Dept: URBAN - METROPOLITAN AREA CLINIC 64 | Facility: CLINIC | Age: 73
End: 2023-03-09
Payer: COMMERCIAL

## 2023-03-09 VITALS
DIASTOLIC BLOOD PRESSURE: 67 MMHG | WEIGHT: 193 LBS | HEIGHT: 68 IN | HEART RATE: 56 BPM | SYSTOLIC BLOOD PRESSURE: 155 MMHG

## 2023-03-09 DIAGNOSIS — K22.70 BARRETT'S ESOPHAGUS WITHOUT DYSPLASIA: ICD-10-CM

## 2023-03-09 DIAGNOSIS — Z86.010 PERSONAL HISTORY OF COLONIC POLYPS: ICD-10-CM

## 2023-03-09 DIAGNOSIS — R10.13 EPIGASTRIC PAIN: ICD-10-CM

## 2023-03-09 DIAGNOSIS — K59.00 CONSTIPATION, UNSPECIFIED: ICD-10-CM

## 2023-03-09 DIAGNOSIS — R14.2 ERUCTATION: ICD-10-CM

## 2023-03-09 DIAGNOSIS — K30 FUNCTIONAL DYSPEPSIA: ICD-10-CM

## 2023-03-09 DIAGNOSIS — Z79.01 LONG TERM (CURRENT) USE OF ANTICOAGULANTS: ICD-10-CM

## 2023-03-09 PROCEDURE — 99203 OFFICE O/P NEW LOW 30 MIN: CPT

## 2023-03-09 RX ORDER — ESOMEPRAZOLE MAGNESIUM 40 MG/1
40 CAPSULE, DELAYED RELEASE ORAL
Qty: 90 | Refills: 3 | Status: ACTIVE
Start: 2023-03-09

## 2023-03-09 RX ORDER — FAMOTIDINE 40 MG/1
40 TABLET, FILM COATED ORAL
Qty: 90 | Refills: 3 | Status: ACTIVE
Start: 2023-03-09

## 2023-03-13 ENCOUNTER — TELEPHONE (OUTPATIENT)
Dept: CARDIOLOGY | Facility: CLINIC | Age: 73
End: 2023-03-13
Payer: MEDICARE

## 2023-03-13 NOTE — TELEPHONE ENCOUNTER
DR. JADA CABAN  EGD/COLONOSCOPY  SURGERY 4/25/23  PHONE [phone]  [phone]    PLACED ON DR. ALVARADO DESK

## 2023-03-16 NOTE — PROGRESS NOTES
Encounter Date:03/27/2023    Last seen 9/12/2022      Patient ID: Evaristo Barlow is a 72 y.o. male.    Chief Complaint:      Status post stent  Hypertension  Dyslipidemia        History of Present Illness  Since I have last seen, the patient has been without any chest discomfort ,shortness of breath, palpitations, dizziness or syncope.  Denies having any headache ,abdominal pain ,nausea, vomiting , diarrhea constipation, loss of weight or loss of appetite.  Denies having any excessive bruising ,hematuria or blood in the stool.    Review of all systems negative except as indicated.    Reviewed ROS.    Assessment and Plan         [[[[[[[[[[[[[[[[[[[[[  Impression  ==============  -Shortness of breath and tiredness-better     Echocardiogram 7/14/2020  Mild aortic valve stenosis.  Gradient of 14 mmHg mean gradient 7 mmHg  Left ventricular size and contractility is normal with ejection fraction of 60%     Stress Cardiolite test-7/14/2020-abnormal with proximal inferior ischemia     Cardiac catheterization 7/17/2020   No evidence for pulmonary hypertension is present.  Normal left ventricular size and contractility with ejection fraction of 60%.  Left main coronary artery is normal.  Left anterior descending artery normal.  Diagonal branch stent is patent with 50% disease in the midsegment of the stent.  Circumflex coronary artery normal.  Right coronary artery is a large and dominant vessel and is normal.     @@Please note RCA could be selectively engaged using JR4 catheter.  In the past 3D RC catheter was used.@@     -Tingling in the left side of the face and neck.      -status post stent to diagonal branch 06/21/2010 .    Stress Cardiolite test 06/15/2017 revealed reproducible chest discomfort 1.5 mm of inferior and anterolateral ST depression and inferior and apical moderate to significant ischemia.     Cardiac catheterization 06/21/2017 revealed normal left ventricular function diagonal branch stent was patent.   No significant disease was present. Please note RCA was engaged using 3D RC catheter.     - chest discomfort Extreme weakness and tiredness -Improved since lisinopril  was discontinued.      -hypertension and dyslipidemia     -recently diagnosed Solis's esophagitis      -strong family history of coronary artery disease      -former smoker      -allergy to penicillin.  ================    Plan  ============  Status post stent  Patient is not having any angina pectoris or congestive heart failure.  EKG showed sinus bradycardia without any ischemic changes- 9/12/2022  @@(please note RCA was injected using 3D RCA catheter.)@@    Intentional weight loss.  Patient has been walking 3 to 4 miles every day.    Hypertension- well-controlled.  133/75  Continue lisinopril 5 mg a day      Dyslipidemia-continue simvastatin     Medications were reviewed and updated.     Follow-up in the office in 6 months.     Further plan will depend on patient's progress.  [[[[[[[[[[[[[[[[[[[[[[[[[[                             Diagnosis Plan   1. S/P primary angioplasty with coronary stent        2. Mixed hyperlipidemia        3. Status post angioplasty with stent        4. Essential hypertension        5. Shortness of breath        LAB RESULTS (LAST 7 DAYS)    CBC        BMP        CMP         BNP        TROPONIN        CoAg        Creatinine Clearance  CrCl cannot be calculated (Patient's most recent lab result is older than the maximum 30 days allowed.).    ABG        Radiology  No radiology results for the last day                The following portions of the patient's history were reviewed and updated as appropriate: allergies, current medications, past family history, past medical history, past social history, past surgical history and problem list.    Review of Systems   Constitutional: Negative for malaise/fatigue.   Cardiovascular: Negative for chest pain, leg swelling, palpitations and syncope.   Respiratory: Negative for shortness  "of breath.    Skin: Negative for rash.   Gastrointestinal: Negative for nausea and vomiting.   Neurological: Negative for dizziness, light-headedness and numbness.   All other systems reviewed and are negative.        Current Outpatient Medications:   •  aspirin 325 MG tablet, Take 1 tablet by mouth every night at bedtime., Disp: , Rfl:   •  celecoxib (CeleBREX) 200 MG capsule, Take 1 capsule by mouth Daily., Disp: 30 capsule, Rfl: 2  •  clopidogrel (Plavix) 75 MG tablet, Take 1 tablet by mouth Daily., Disp: 90 tablet, Rfl: 3  •  famotidine (PEPCID) 40 MG tablet, , Disp: , Rfl:   •  lisinopril (PRINIVIL,ZESTRIL) 5 MG tablet, Take 1 tablet by mouth Daily., Disp: 90 tablet, Rfl: 3  •  nitroglycerin (NITROSTAT) 0.4 MG SL tablet, Place 1 tablet under the tongue Every 5 (Five) Minutes As Needed for Chest Pain. Take no more than 3 doses in 15 minutes., Disp: , Rfl:   •  sildenafil (Viagra) 100 MG tablet, Take 1 tablet by mouth Daily As Needed for Erectile Dysfunction., Disp: 10 tablet, Rfl: 2  •  simvastatin (ZOCOR) 20 MG tablet, Take 1 tablet by mouth Every Night., Disp: 90 tablet, Rfl: 3    Allergies   Allergen Reactions   • Morphine Anaphylaxis     Patient states not allergic, had a reaction one time while in the hospital.  \"been 11 years ago\"       Family History   Problem Relation Age of Onset   • Heart failure Mother    • Heart failure Father    • Heart disease Brother    • Heart disease Brother    • Heart attack Brother    • No Known Problems Brother        Past Surgical History:   Procedure Laterality Date   • CARDIAC CATHETERIZATION     • CARDIAC CATHETERIZATION N/A 7/17/2020    Procedure: Left and Right Heart Cath with Coronary Angiography;  Surgeon: Talia Hickey MD;  Location: [facility] CATH INVASIVE LOCATION;  Service: Cardiovascular;  Laterality: N/A;   • CORONARY ANGIOPLASTY WITH STENT PLACEMENT  06/21/2010   • UMBILICAL HERNIA REPAIR         Past Medical History:   Diagnosis Date   • Arthritis 6/17/2019 " "  • Solis esophagus 2018   • Biliary dyskinesia 2019   • Coronary artery disease    • Diverticulosis 2018   • Gastric reflux 2019   • Hemorrhoids 2019   • Hyperlipidemia 2019   • Hypertension 2019       Family History   Problem Relation Age of Onset   • Heart failure Mother    • Heart failure Father    • Heart disease Brother    • Heart disease Brother    • Heart attack Brother    • No Known Problems Brother        Social History     Socioeconomic History   • Marital status:    • Number of children: 2   Tobacco Use   • Smoking status: Former     Packs/day: 1.00     Years: 20.00     Pack years: 20.00     Types: Cigarettes, Pipe     Quit date: 1978     Years since quittin.2   • Smokeless tobacco: Never   • Tobacco comments:     Smoked for 20yrs   Vaping Use   • Vaping Use: Never used   Substance and Sexual Activity   • Alcohol use: Never   • Drug use: Never   • Sexual activity: Yes     Partners: Female     Birth control/protection: Vasectomy           ECG 12 Lead    Date/Time: 3/27/2023 10:28 AM  Performed by: Talia Hickey MD  Authorized by: Talia Hickey MD   Comparison: compared with previous ECG   Similar to previous ECG  Comparison to previous ECG: Sinus bradycardia 48/min nonspecific ST-T wave changes normal axis normal intervals no ectopy no significant change from 2022                Objective:       Physical Exam    /75 (BP Location: Right arm, Patient Position: Sitting, Cuff Size: Large Adult)   Pulse (!) 48   Ht 172.7 cm (68\")   Wt 85.3 kg (188 lb)   SpO2 100% Comment: RA  BMI 28.59 kg/m²   The patient is alert, oriented and in no distress.    Vital signs as noted above.    Head and neck revealed no carotid bruits or jugular venous distension.  No thyromegaly or lymphadenopathy is present.    Lungs clear.  No wheezing.  Breath sounds are normal bilaterally.    Heart normal first and second heart sounds.  No murmur..  No pericardial " rub is present.  No gallop is present.    Abdomen soft and nontender.  No organomegaly is present.    Extremities revealed good peripheral pulses without any pedal edema.    Skin warm and dry.    Musculoskeletal system is grossly normal.    CNS grossly normal.    Reviewed and updated.

## 2023-03-16 NOTE — PROGRESS NOTES
"Encounter Date:03/27/2023      Patient ID: Evaristo Barlow is a 72 y.o. male.    Chief Complaint:      History of Present Illness      Assessment and Plan                Diagnosis Plan   1. S/P primary angioplasty with coronary stent        2. Mixed hyperlipidemia        3. Status post angioplasty with stent        4. Essential hypertension        5. Shortness of breath        LAB RESULTS (LAST 7 DAYS)    CBC        BMP        CMP         BNP        TROPONIN        CoAg        Creatinine Clearance  Estimated Creatinine Clearance: 89.6 mL/min (by C-G formula based on SCr of 0.79 mg/dL).    ABG        Radiology  No radiology results for the last day                The following portions of the patient's history were reviewed and updated as appropriate: {history reviewed:20406::\"allergies\",\"current medications\",\"past family history\",\"past medical history\",\"past social history\",\"past surgical history\",\"problem list\"}.    ROS      Current Outpatient Medications:   •  aspirin 325 MG tablet, Take 325 mg by mouth every night at bedtime., Disp: , Rfl:   •  celecoxib (CeleBREX) 200 MG capsule, Take 1 capsule by mouth Daily., Disp: 30 capsule, Rfl: 2  •  clopidogrel (Plavix) 75 MG tablet, Take 1 tablet by mouth Daily., Disp: 90 tablet, Rfl: 3  •  lisinopril (PRINIVIL,ZESTRIL) 5 MG tablet, Take 1 tablet by mouth Daily., Disp: 90 tablet, Rfl: 3  •  nitroglycerin (NITROSTAT) 0.4 MG SL tablet, Place 0.4 mg under the tongue Every 5 (Five) Minutes As Needed for Chest Pain. Take no more than 3 doses in 15 minutes., Disp: , Rfl:   •  sildenafil (Viagra) 100 MG tablet, Take 1 tablet by mouth Daily As Needed for Erectile Dysfunction., Disp: 10 tablet, Rfl: 2  •  simvastatin (ZOCOR) 20 MG tablet, Take 1 tablet by mouth Every Night., Disp: 90 tablet, Rfl: 3    Allergies   Allergen Reactions   • Morphine Anaphylaxis     Patient states not allergic, had a reaction one time while in the hospital.  \"been 11 years ago\"       Family History "   Problem Relation Age of Onset   • Heart failure Mother    • Heart failure Father    • Heart disease Brother    • Heart disease Brother    • Heart attack Brother    • No Known Problems Brother        Past Surgical History:   Procedure Laterality Date   • CARDIAC CATHETERIZATION     • CARDIAC CATHETERIZATION N/A 2020    Procedure: Left and Right Heart Cath with Coronary Angiography;  Surgeon: Talia Hickey MD;  Location: [facility] CATH INVASIVE LOC[facility];  Service: Cardiovascular;  Laterality: N/A;   • CORONARY ANGIOPLASTY WITH STENT PLACEMENT  2010   • UMBILICAL HERNIA REPAIR         Past Medical History:   Diagnosis Date   • Arthritis 2019   • Solis esophagus 2018   • Biliary dyskinesia 2019   • Coronary artery disease    • Diverticulosis 2018   • Gastric reflux 2019   • Hemorrhoids 2019   • Hyperlipidemia 2019   • Hypertension 2019       Family History   Problem Relation Age of Onset   • Heart failure Mother    • Heart failure Father    • Heart disease Brother    • Heart disease Brother    • Heart attack Brother    • No Known Problems Brother        Social History     Socioeconomic History   • Marital status:    • Number of children: 2   Tobacco Use   • Smoking status: Former     Years: 20.00     Types: Cigarettes     Quit date:      Years since quittin.2   • Smokeless tobacco: Never   Vaping Use   • Vaping Use: Never used   Substance and Sexual Activity   • Alcohol use: Never   • Drug use: Never   • Sexual activity: Yes     Partners: Female     Birth control/protection: Vasectomy         Procedures      Objective:       Physical Exam    There were no vitals taken for this visit.  The patient is alert, oriented and in no distress.    Vital signs as noted above.    Head and neck revealed no carotid bruits or jugular venous distension.  No thyromegaly or lymphadenopathy is present.    Lungs clear.  No wheezing.  Breath sounds are normal  bilaterally.    Heart normal first and second heart sounds.  No murmur..  No pericardial rub is present.  No gallop is present.    Abdomen soft and nontender.  No organomegaly is present.    Extremities revealed good peripheral pulses without any pedal edema.    Skin warm and dry.    Musculoskeletal system is grossly normal.    CNS grossly normal.

## 2023-03-27 ENCOUNTER — OFFICE VISIT (OUTPATIENT)
Dept: CARDIOLOGY | Facility: CLINIC | Age: 73
End: 2023-03-27
Payer: MEDICARE

## 2023-03-27 VITALS
DIASTOLIC BLOOD PRESSURE: 75 MMHG | BODY MASS INDEX: 28.49 KG/M2 | OXYGEN SATURATION: 100 % | HEIGHT: 68 IN | WEIGHT: 188 LBS | SYSTOLIC BLOOD PRESSURE: 133 MMHG | HEART RATE: 48 BPM

## 2023-03-27 DIAGNOSIS — Z95.820 STATUS POST ANGIOPLASTY WITH STENT: ICD-10-CM

## 2023-03-27 DIAGNOSIS — I10 ESSENTIAL HYPERTENSION: ICD-10-CM

## 2023-03-27 DIAGNOSIS — E78.2 MIXED HYPERLIPIDEMIA: ICD-10-CM

## 2023-03-27 DIAGNOSIS — Z95.5 S/P PRIMARY ANGIOPLASTY WITH CORONARY STENT: Primary | ICD-10-CM

## 2023-03-27 DIAGNOSIS — R06.02 SHORTNESS OF BREATH: ICD-10-CM

## 2023-03-27 PROCEDURE — 93000 ELECTROCARDIOGRAM COMPLETE: CPT | Performed by: INTERNAL MEDICINE

## 2023-03-27 PROCEDURE — 99214 OFFICE O/P EST MOD 30 MIN: CPT | Performed by: INTERNAL MEDICINE

## 2023-03-27 PROCEDURE — 3075F SYST BP GE 130 - 139MM HG: CPT | Performed by: INTERNAL MEDICINE

## 2023-03-27 PROCEDURE — 3078F DIAST BP <80 MM HG: CPT | Performed by: INTERNAL MEDICINE

## 2023-03-27 PROCEDURE — 1160F RVW MEDS BY RX/DR IN RCRD: CPT | Performed by: INTERNAL MEDICINE

## 2023-03-27 PROCEDURE — 1159F MED LIST DOCD IN RCRD: CPT | Performed by: INTERNAL MEDICINE

## 2023-03-27 RX ORDER — FAMOTIDINE 40 MG/1
TABLET, FILM COATED ORAL
COMMUNITY
Start: 2023-03-09

## 2023-04-25 ENCOUNTER — OFFICE (OUTPATIENT)
Dept: URBAN - METROPOLITAN AREA PATHOLOGY 4 | Facility: PATHOLOGY | Age: 73
End: 2023-04-25
Payer: COMMERCIAL

## 2023-04-25 ENCOUNTER — ON CAMPUS - OUTPATIENT (OUTPATIENT)
Dept: URBAN - METROPOLITAN AREA HOSPITAL 2 | Facility: HOSPITAL | Age: 73
End: 2023-04-25
Payer: COMMERCIAL

## 2023-04-25 VITALS
RESPIRATION RATE: 16 BRPM | HEART RATE: 66 BPM | RESPIRATION RATE: 18 BRPM | HEART RATE: 55 BPM | SYSTOLIC BLOOD PRESSURE: 149 MMHG | HEIGHT: 68 IN | DIASTOLIC BLOOD PRESSURE: 82 MMHG | OXYGEN SATURATION: 99 % | HEART RATE: 58 BPM | HEART RATE: 56 BPM | HEART RATE: 61 BPM | HEART RATE: 60 BPM | HEART RATE: 63 BPM | SYSTOLIC BLOOD PRESSURE: 143 MMHG | DIASTOLIC BLOOD PRESSURE: 79 MMHG | SYSTOLIC BLOOD PRESSURE: 128 MMHG | HEART RATE: 65 BPM | OXYGEN SATURATION: 97 % | DIASTOLIC BLOOD PRESSURE: 85 MMHG | SYSTOLIC BLOOD PRESSURE: 139 MMHG | DIASTOLIC BLOOD PRESSURE: 74 MMHG | SYSTOLIC BLOOD PRESSURE: 124 MMHG | RESPIRATION RATE: 21 BRPM | WEIGHT: 184 LBS | OXYGEN SATURATION: 100 % | TEMPERATURE: 98 F | RESPIRATION RATE: 14 BRPM | RESPIRATION RATE: 12 BRPM | RESPIRATION RATE: 15 BRPM | SYSTOLIC BLOOD PRESSURE: 135 MMHG | SYSTOLIC BLOOD PRESSURE: 129 MMHG | OXYGEN SATURATION: 98 % | SYSTOLIC BLOOD PRESSURE: 118 MMHG | SYSTOLIC BLOOD PRESSURE: 145 MMHG | DIASTOLIC BLOOD PRESSURE: 80 MMHG | DIASTOLIC BLOOD PRESSURE: 76 MMHG | RESPIRATION RATE: 13 BRPM | OXYGEN SATURATION: 96 % | RESPIRATION RATE: 17 BRPM

## 2023-04-25 DIAGNOSIS — Z86.010 PERSONAL HISTORY OF COLONIC POLYPS: ICD-10-CM

## 2023-04-25 DIAGNOSIS — K22.70 BARRETT'S ESOPHAGUS WITHOUT DYSPLASIA: ICD-10-CM

## 2023-04-25 DIAGNOSIS — D12.5 BENIGN NEOPLASM OF SIGMOID COLON: ICD-10-CM

## 2023-04-25 DIAGNOSIS — D12.2 BENIGN NEOPLASM OF ASCENDING COLON: ICD-10-CM

## 2023-04-25 DIAGNOSIS — K57.30 DIVERTICULOSIS OF LARGE INTESTINE WITHOUT PERFORATION OR ABS: ICD-10-CM

## 2023-04-25 PROBLEM — K63.5 POLYP OF COLON: Status: ACTIVE | Noted: 2023-04-25

## 2023-04-25 LAB
GI HISTOLOGY: A. UNSPECIFIED: (no result)
GI HISTOLOGY: B. UNSPECIFIED: (no result)
GI HISTOLOGY: C. UNSPECIFIED: (no result)
GI HISTOLOGY: PDF REPORT: (no result)

## 2023-04-25 PROCEDURE — 88305 TISSUE EXAM BY PATHOLOGIST: CPT | Mod: 26 | Performed by: INTERNAL MEDICINE

## 2023-04-25 PROCEDURE — 43239 EGD BIOPSY SINGLE/MULTIPLE: CPT | Performed by: INTERNAL MEDICINE

## 2023-04-25 PROCEDURE — 45385 COLONOSCOPY W/LESION REMOVAL: CPT | Mod: PT | Performed by: INTERNAL MEDICINE

## 2023-05-17 ENCOUNTER — OFFICE (OUTPATIENT)
Dept: URBAN - METROPOLITAN AREA CLINIC 64 | Facility: CLINIC | Age: 73
End: 2023-05-17

## 2023-05-17 VITALS
DIASTOLIC BLOOD PRESSURE: 90 MMHG | WEIGHT: 182 LBS | SYSTOLIC BLOOD PRESSURE: 163 MMHG | HEIGHT: 68 IN | HEART RATE: 60 BPM

## 2023-05-17 DIAGNOSIS — K80.20 CALCULUS OF GALLBLADDER WITHOUT CHOLECYSTITIS WITHOUT OBSTRU: ICD-10-CM

## 2023-05-17 PROCEDURE — 99214 OFFICE O/P EST MOD 30 MIN: CPT | Performed by: INTERNAL MEDICINE

## 2023-08-17 PROBLEM — Z86.010 PERSONAL HISTORY OF COLONIC POLYPS: Status: ACTIVE | Noted: 2023-08-17

## 2023-08-17 PROBLEM — E80.6 HYPERBILIRUBINEMIA: Status: ACTIVE | Noted: 2023-08-17

## 2023-08-17 PROBLEM — Z86.0100 PERSONAL HISTORY OF COLONIC POLYPS: Status: ACTIVE | Noted: 2023-08-17

## 2023-08-17 PROBLEM — K30 FUNCTIONAL DYSPEPSIA: Status: ACTIVE | Noted: 2023-08-17

## 2023-08-17 PROBLEM — R10.13 EPIGASTRIC PAIN: Status: ACTIVE | Noted: 2023-08-17

## 2023-08-17 PROBLEM — R14.2 ERUCTATION: Status: ACTIVE | Noted: 2023-08-17

## 2023-08-22 ENCOUNTER — PATIENT MESSAGE (OUTPATIENT)
Dept: FAMILY MEDICINE CLINIC | Facility: CLINIC | Age: 73
End: 2023-08-22

## 2023-08-22 ENCOUNTER — LAB (OUTPATIENT)
Dept: LAB | Facility: HOSPITAL | Age: 73
End: 2023-08-22
Payer: MEDICARE

## 2023-08-22 ENCOUNTER — OFFICE VISIT (OUTPATIENT)
Dept: FAMILY MEDICINE CLINIC | Facility: CLINIC | Age: 73
End: 2023-08-22
Payer: MEDICARE

## 2023-08-22 VITALS
OXYGEN SATURATION: 96 % | BODY MASS INDEX: 30.68 KG/M2 | WEIGHT: 201.8 LBS | SYSTOLIC BLOOD PRESSURE: 137 MMHG | TEMPERATURE: 97.8 F | HEART RATE: 60 BPM | DIASTOLIC BLOOD PRESSURE: 68 MMHG

## 2023-08-22 DIAGNOSIS — Z00.00 MEDICARE ANNUAL WELLNESS VISIT, SUBSEQUENT: Primary | ICD-10-CM

## 2023-08-22 DIAGNOSIS — I10 PRIMARY HYPERTENSION: ICD-10-CM

## 2023-08-22 DIAGNOSIS — K22.70 BARRETT'S ESOPHAGUS WITHOUT DYSPLASIA: ICD-10-CM

## 2023-08-22 DIAGNOSIS — E78.2 MIXED HYPERLIPIDEMIA: ICD-10-CM

## 2023-08-22 LAB
ALBUMIN SERPL-MCNC: 4.3 G/DL (ref 3.5–5.2)
ALBUMIN/GLOB SERPL: 2.3 G/DL
ALP SERPL-CCNC: 66 U/L (ref 39–117)
ALT SERPL W P-5'-P-CCNC: 19 U/L (ref 1–41)
ANION GAP SERPL CALCULATED.3IONS-SCNC: 9 MMOL/L (ref 5–15)
AST SERPL-CCNC: 21 U/L (ref 1–40)
BASOPHILS # BLD AUTO: 0.03 10*3/MM3 (ref 0–0.2)
BASOPHILS NFR BLD AUTO: 0.6 % (ref 0–1.5)
BILIRUB SERPL-MCNC: 1.2 MG/DL (ref 0–1.2)
BUN SERPL-MCNC: 15 MG/DL (ref 8–23)
BUN/CREAT SERPL: 17.4 (ref 7–25)
CALCIUM SPEC-SCNC: 9.5 MG/DL (ref 8.6–10.5)
CHLORIDE SERPL-SCNC: 106 MMOL/L (ref 98–107)
CHOLEST SERPL-MCNC: 134 MG/DL (ref 0–200)
CO2 SERPL-SCNC: 27 MMOL/L (ref 22–29)
CREAT SERPL-MCNC: 0.86 MG/DL (ref 0.76–1.27)
DEPRECATED RDW RBC AUTO: 38.4 FL (ref 37–54)
EGFRCR SERPLBLD CKD-EPI 2021: 90.9 ML/MIN/1.73
EOSINOPHIL # BLD AUTO: 0.08 10*3/MM3 (ref 0–0.4)
EOSINOPHIL NFR BLD AUTO: 1.5 % (ref 0.3–6.2)
ERYTHROCYTE [DISTWIDTH] IN BLOOD BY AUTOMATED COUNT: 12.7 % (ref 12.3–15.4)
GLOBULIN UR ELPH-MCNC: 1.9 GM/DL
GLUCOSE SERPL-MCNC: 113 MG/DL (ref 65–99)
HCT VFR BLD AUTO: 47.6 % (ref 37.5–51)
HDLC SERPL-MCNC: 33 MG/DL (ref 40–60)
HGB BLD-MCNC: 16.1 G/DL (ref 13–17.7)
LDLC SERPL CALC-MCNC: 85 MG/DL (ref 0–100)
LDLC/HDLC SERPL: 2.55 {RATIO}
LYMPHOCYTES # BLD AUTO: 1.34 10*3/MM3 (ref 0.7–3.1)
LYMPHOCYTES NFR BLD AUTO: 25.7 % (ref 19.6–45.3)
MCH RBC QN AUTO: 28.5 PG (ref 26.6–33)
MCHC RBC AUTO-ENTMCNC: 33.8 G/DL (ref 31.5–35.7)
MCV RBC AUTO: 84.2 FL (ref 79–97)
MONOCYTES # BLD AUTO: 0.6 10*3/MM3 (ref 0.1–0.9)
MONOCYTES NFR BLD AUTO: 11.5 % (ref 5–12)
NEUTROPHILS NFR BLD AUTO: 3.15 10*3/MM3 (ref 1.7–7)
NEUTROPHILS NFR BLD AUTO: 60.5 % (ref 42.7–76)
PLATELET # BLD AUTO: 126 10*3/MM3 (ref 140–450)
PMV BLD AUTO: 11.5 FL (ref 6–12)
POTASSIUM SERPL-SCNC: 4.5 MMOL/L (ref 3.5–5.2)
PROT SERPL-MCNC: 6.2 G/DL (ref 6–8.5)
RBC # BLD AUTO: 5.65 10*6/MM3 (ref 4.14–5.8)
SODIUM SERPL-SCNC: 142 MMOL/L (ref 136–145)
TRIGL SERPL-MCNC: 84 MG/DL (ref 0–150)
VLDLC SERPL-MCNC: 16 MG/DL (ref 5–40)
WBC NRBC COR # BLD: 5.21 10*3/MM3 (ref 3.4–10.8)

## 2023-08-22 PROCEDURE — 36415 COLL VENOUS BLD VENIPUNCTURE: CPT | Performed by: FAMILY MEDICINE

## 2023-08-22 PROCEDURE — 80053 COMPREHEN METABOLIC PANEL: CPT | Performed by: FAMILY MEDICINE

## 2023-08-22 PROCEDURE — 80061 LIPID PANEL: CPT | Performed by: FAMILY MEDICINE

## 2023-08-22 PROCEDURE — 85025 COMPLETE CBC W/AUTO DIFF WBC: CPT | Performed by: FAMILY MEDICINE

## 2023-08-22 NOTE — PROGRESS NOTES
The ABCs of the Annual Wellness Visit  Subsequent Medicare Wellness Visit    Subjective    Evaristo Barlow is a 72 y.o. male who presents for a Subsequent Medicare Wellness Visit.    The following portions of the patient's history were reviewed and   updated as appropriate: allergies, current medications, past family history, past medical history, past social history, past surgical history, and problem list.    Compared to one year ago, the patient feels his physical   health is the same.    Compared to one year ago, the patient feels his mental   health is the same.    Recent Hospitalizations:  He was not admitted to the hospital during the last year.       Current Medical Providers:  Patient Care Team:  Denzel Rashid MD as PCP - General (Family Medicine)  Talia Hickey MD as Consulting Physician (Cardiology)  Joann Aparicio APRN as Nurse Practitioner (Family Medicine)    Outpatient Medications Prior to Visit   Medication Sig Dispense Refill    aspirin 325 MG tablet Take 1 tablet by mouth every night at bedtime.      clopidogrel (Plavix) 75 MG tablet Take 1 tablet by mouth Daily. 90 tablet 3    esomeprazole (nexIUM) 20 MG capsule Take 1 capsule by mouth Every Morning Before Breakfast.      famotidine (PEPCID) 40 MG tablet       lisinopril (PRINIVIL,ZESTRIL) 5 MG tablet Take 1 tablet by mouth Daily. 90 tablet 3    nitroglycerin (NITROSTAT) 0.4 MG SL tablet Place 1 tablet under the tongue Every 5 (Five) Minutes As Needed for Chest Pain. Take no more than 3 doses in 15 minutes.      sildenafil (Viagra) 100 MG tablet Take 1 tablet by mouth Daily As Needed for Erectile Dysfunction. 10 tablet 2    simvastatin (ZOCOR) 20 MG tablet Take 1 tablet by mouth Every Night. 90 tablet 3    celecoxib (CeleBREX) 200 MG capsule Take 1 capsule by mouth Daily. (Patient not taking: Reported on 8/22/2023) 30 capsule 2     No facility-administered medications prior to visit.       No opioid medication identified on active  "medication list. I have reviewed chart for other potential  high risk medication/s and harmful drug interactions in the elderly.        Aspirin is on active medication list. Aspirin use is indicated based on review of current medical condition/s. Pros and cons of this therapy have been discussed today. Benefits of this medication outweigh potential harm.  Patient has been encouraged to continue taking this medication.  .      Patient Active Problem List   Diagnosis    High blood pressure    Arthritis    S/P primary angioplasty with coronary stent    Hyperlipidemia    Gastric reflux    CAD (coronary artery disease)    Solis's esophagus without dysplasia    Diverticulosis    Hemorrhoids    Precordial pain    Abnormal cardiovascular stress test    Anemia    Dizziness    Dyslipidemia    Elevated blood pressure reading without diagnosis of hypertension    Family history of coronary artery disease    Impaired glucose tolerance    Abnormal nuclear stress test    Shortness of breath    Status post angioplasty with stent    Epigastric pain    Eructation    Functional dyspepsia    Hyperbilirubinemia    Personal history of colonic polyps     Advance Care Planning   Advance Care Planning     Advance Directive is not on file.  ACP discussion was held with the patient during this visit. Patient does not have an advance directive, information provided.     Objective    Vitals:    08/22/23 0803   BP: 137/68   BP Location: Left arm   Patient Position: Sitting   Cuff Size: Large Adult   Pulse: 60   Temp: 97.8 øF (36.6 øC)   TempSrc: Temporal   SpO2: 96%   Weight: 91.5 kg (201 lb 12.8 oz)     Estimated body mass index is 30.68 kg/mý as calculated from the following:    Height as of 3/27/23: 172.7 cm (68\").    Weight as of this encounter: 91.5 kg (201 lb 12.8 oz).    BMI is >= 30 and <35. (Class 1 Obesity). The following options were offered after discussion;: exercise counseling/recommendations and nutrition " counseling/recommendations      Does the patient have evidence of cognitive impairment? No          HEALTH RISK ASSESSMENT    Smoking Status:  Social History     Tobacco Use   Smoking Status Former    Packs/day: 1.00    Years: 20.00    Pack years: 20.00    Types: Cigarettes, Pipe    Quit date: 1978    Years since quittin.6   Smokeless Tobacco Never   Tobacco Comments    Smoked for 20yrs     Alcohol Consumption:  Social History     Substance and Sexual Activity   Alcohol Use Never     Fall Risk Screen:    HITESH Fall Risk Assessment was completed, and patient is at MODERATE risk for falls. Assessment completed on:2023    Depression Screenin/22/2023     8:03 AM   PHQ-2/PHQ-9 Depression Screening   Little Interest or Pleasure in Doing Things 0-->not at all   Feeling Down, Depressed or Hopeless 0-->not at all   PHQ-9: Brief Depression Severity Measure Score 0       Health Habits and Functional and Cognitive Screenin/22/2023     8:06 AM   Functional & Cognitive Status   Do you have difficulty preparing food and eating? No   Do you have difficulty bathing yourself, getting dressed or grooming yourself? No   Do you have difficulty using the toilet? No   Do you have difficulty moving around from place to place? No   Do you have trouble with steps or getting out of a bed or a chair? Yes   Current Diet Well Balanced Diet   Dental Exam Not up to date   Eye Exam Not up to date   Exercise (times per week) 3 times per week   Current Exercises Include Walking;No Regular Exercise        Exercise Comment goes to the [facility]    Do you need help using the phone?  No   Are you deaf or do you have serious difficulty hearing?  Yes   Do you need help to go to places out of walking distance? No   Do you need help shopping? No   Do you need help preparing meals?  No   Do you need help with housework?  No   Do you need help with laundry? No   Do you need help taking your medications? No   Do you need help  managing money? No   Do you ever drive or ride in a car without wearing a seat belt? No   Have you felt unusual stress, anger or loneliness in the last month? No   Who do you live with? Spouse   If you need help, do you have trouble finding someone available to you? No   Have you been bothered in the last four weeks by sexual problems? Yes   Do you have difficulty concentrating, remembering or making decisions? No       Age-appropriate Screening Schedule:  Refer to the list below for future screening recommendations based on patient's age, sex and/or medical conditions. Orders for these recommended tests are listed in the plan section. The patient has been provided with a written plan.    Health Maintenance   Topic Date Due    TDAP/TD VACCINES (1 - Tdap) Never done    ZOSTER VACCINE (1 of 2) Never done    Pneumococcal Vaccine 65+ (1 - PCV) Never done    ANNUAL WELLNESS VISIT  10/14/2020    COVID-19 Vaccine (5 - Moderna series) 09/06/2022    INFLUENZA VACCINE  10/01/2023    LIPID PANEL  02/21/2024    COLORECTAL CANCER SCREENING  04/25/2033    AAA SCREEN (ONE-TIME)  Completed    HEPATITIS C SCREENING  Addressed                  CMS Preventative Services Quick Reference  Risk Factors Identified During Encounter  Immunizations Discussed/Encouraged: Influenza, Prevnar 20 (Pneumococcal 20-valent conjugate), and COVID19  Dental Screening Recommended  Vision Screening Recommended  The above risks/problems have been discussed with the patient.  Pertinent information has been shared with the patient in the After Visit Summary.  An After Visit Summary and PPPS were made available to the patient.    Follow Up:   Next Medicare Wellness visit to be scheduled in 1 year.       Additional E&M Note during same encounter follows:  Patient has multiple medical problems which are significant and separately identifiable that require additional work above and beyond the Medicare Wellness Visit.      Chief Complaint  Medicare  Wellness-subsequent (Fasting for labs - [facility] )    Subjective        HPI  Evaristo SHANDA Barlow is also being seen today for follow-up on his high cholesterol and high blood pressure.  He also takes medication for Solis's esophagus. He still sees cardiology yearly for surveillance of CAD after a stent 3 years ago. He exercises regularly and feels fine, in general.     Review of Systems   Constitutional:  Negative for activity change and fatigue.   HENT:  Negative for congestion, facial swelling, nosebleeds, postnasal drip, rhinorrhea, tinnitus and trouble swallowing.    Eyes:  Negative for visual disturbance.   Respiratory:  Negative for cough, shortness of breath and wheezing.    Cardiovascular:  Negative for chest pain and leg swelling.   Gastrointestinal:  Negative for abdominal pain, constipation, diarrhea, nausea and vomiting.   Genitourinary:  Negative for difficulty urinating, frequency and urgency.   Musculoskeletal:  Negative for arthralgias, back pain and neck pain.   Skin:  Negative for rash.   Neurological:  Negative for dizziness, syncope, weakness, light-headedness, numbness and confusion.   Hematological:  Does not bruise/bleed easily.   Psychiatric/Behavioral:  Negative for decreased concentration, sleep disturbance and suicidal ideas. The patient is not nervous/anxious.      Objective   Vital Signs:  /68 (BP Location: Left arm, Patient Position: Sitting, Cuff Size: Large Adult)   Pulse 60   Temp 97.8 øF (36.6 øC) (Temporal)   Wt 91.5 kg (201 lb 12.8 oz)   SpO2 96%   BMI 30.68 kg/mý     Physical Exam  Vitals and nursing note reviewed.   HENT:      Right Ear: Tympanic membrane and ear canal normal.      Left Ear: Tympanic membrane and ear canal normal.   Cardiovascular:      Rate and Rhythm: Normal rate and regular rhythm.      Heart sounds: Normal heart sounds. No murmur heard.  Pulmonary:      Effort: Pulmonary effort is normal.      Breath sounds: No  wheezing or rales.   Abdominal:      General: Bowel sounds are normal.      Palpations: Abdomen is soft.      Tenderness: There is no abdominal tenderness. There is no guarding.   Musculoskeletal:      Cervical back: Neck supple.      Right lower leg: No edema.      Left lower leg: No edema.   Lymphadenopathy:      Cervical: No cervical adenopathy.   Neurological:      Mental Status: He is alert and oriented to person, place, and time. Mental status is at baseline.   Psychiatric:         Mood and Affect: Mood normal.        The following data was reviewed by: Denzel Rashid MD on 08/22/2023:  Common labs          2/21/2023    09:19   Common Labs   Glucose 99    BUN 18    Creatinine 0.79    Sodium 141    Potassium 4.5    Chloride 105    Calcium 10.0    Albumin 4.7    Total Bilirubin 1.4    Alkaline Phosphatase 64    AST (SGOT) 18    ALT (SGPT) 17    WBC 4.35    Hemoglobin 16.2    Hematocrit 48.2    Platelets 129    Total Cholesterol 133    Triglycerides 87    HDL Cholesterol 36    LDL Cholesterol  80    PSA 0.735      Data reviewed : N/A           Assessment and Plan   Diagnoses and all orders for this visit:    1. subsequent wellness Medicare (Primary)    2. Primary hypertension  -     Comprehensive metabolic panel  -     Lipid panel    3. Mixed hyperlipidemia  -     Comprehensive metabolic panel  -     Lipid panel    4. Solis's esophagus without dysplasia  -     CBC w AUTO Differential           I spent 30 minutes caring for Evaristo on this date of service. This time includes time spent by me in the following activities:preparing for the visit, obtaining and/or reviewing a separately obtained history, performing a medically appropriate examination and/or evaluation , counseling and educating the patient/family/caregiver, ordering medications, tests, or procedures, and documenting information in the medical record  Follow Up   No follow-ups on file.  Patient was given instructions and counseling regarding  his condition or for health maintenance advice. Please see specific information pulled into the AVS if appropriate.     I will update labs and adjust his plan if indicated  I will see again in a year for follow up  He is to call for new concerns as they arise  I did advise updated Covid and Flu vaccines this fall, along with the RSV vaccine that will be available soon  He has already had a pneumonia vaccine

## 2023-09-29 NOTE — PROGRESS NOTES
Encounter Date:10/16/2023  Last seen 3/27/2023      Patient ID: Evaristo Barlow is a 72 y.o. male.    Chief Complaint:     Status post stent  Hypertension  Dyslipidemia        History of Present Illness  Since I have last seen, the patient has been without any chest discomfort ,shortness of breath, palpitations, dizziness or syncope.  Denies having any headache ,abdominal pain ,nausea, vomiting , diarrhea constipation, loss of weight or loss of appetite.  Denies having any excessive bruising ,hematuria or blood in the stool.    Review of all systems negative except as indicated.    Reviewed ROS.  Assessment and Plan         [[[[[[[[[[[[[[[[[[[[[  History  ==============  -Shortness of breath and tiredness-better     Echocardiogram 7/14/2020  Mild aortic valve stenosis.  Gradient of 14 mmHg mean gradient 7 mmHg  Left ventricular size and contractility is normal with ejection fraction of 60%     Stress Cardiolite test-7/14/2020-abnormal with proximal inferior ischemia     Cardiac catheterization 7/17/2020   No evidence for pulmonary hypertension is present.  Normal left ventricular size and contractility with ejection fraction of 60%.  Left main coronary artery is normal.  Left anterior descending artery normal.  Diagonal branch stent is patent with 50% disease in the midsegment of the stent.  Circumflex coronary artery normal.  Right coronary artery is a large and dominant vessel and is normal.     @@Please note RCA could be selectively engaged using JR4 catheter.  In the past 3D RC catheter was used.@@     -Tingling in the left side of the face and neck.      -status post stent to diagonal branch 06/21/2010 .    Stress Cardiolite test 06/15/2017 revealed reproducible chest discomfort 1.5 mm of inferior and anterolateral ST depression and inferior and apical moderate to significant ischemia.     Cardiac catheterization 06/21/2017 revealed normal left ventricular function diagonal branch stent was patent.  No  significant disease was present. Please note RCA was engaged using 3D RC catheter.     - chest discomfort Extreme weakness and tiredness -Improved since lisinopril  was discontinued.      -hypertension and dyslipidemia     -recently diagnosed Solis's esophagitis      -strong family history of coronary artery disease      -former smoker      -allergy to penicillin.  ================    Plan  ============  Status post stent  Patient is not having any angina pectoris or congestive heart failure.  EKG showed sinus bradycardia without any ischemic changes-10/16/2023    @@(please note RCA was injected using 3D RCA catheter.)@@     Hypertension- well-controlled.  130/82  Continue lisinopril 5 mg a day      Dyslipidemia-continue simvastatin     Medications were reviewed and updated.     Follow-up in the office in 6 months.     Further plan will depend on patient's progress.    Reviewed and updated-10/16/2023  [[[[[[[[[[[[[[[[[[[[[[[[[[             Diagnosis Plan   1. S/P primary angioplasty with coronary stent        2. Mixed hyperlipidemia        3. Status post angioplasty with stent        4. Essential hypertension        5. Shortness of breath        6. Tiredness        LAB RESULTS (LAST 7 DAYS)    CBC        BMP        CMP         BNP        TROPONIN        CoAg        Creatinine Clearance  CrCl cannot be calculated (Patient's most recent lab result is older than the maximum 30 days allowed.).    ABG        Radiology  No radiology results for the last day                The following portions of the patient's history were reviewed and updated as appropriate: allergies, current medications, past family history, past medical history, past social history, past surgical history, and problem list.    Review of Systems   Constitutional: Negative for malaise/fatigue.   Cardiovascular:  Negative for chest pain, dyspnea on exertion, leg swelling and palpitations.   Respiratory:  Negative for cough and shortness of breath.   "  Gastrointestinal:  Negative for abdominal pain, nausea and vomiting.   Neurological:  Negative for dizziness, focal weakness, headaches, light-headedness and numbness.   All other systems reviewed and are negative.        Current Outpatient Medications:     aspirin 325 MG tablet, Take 1 tablet by mouth every night at bedtime., Disp: , Rfl:     celecoxib (CeleBREX) 200 MG capsule, Take 1 capsule by mouth Daily., Disp: 30 capsule, Rfl: 2    clopidogrel (Plavix) 75 MG tablet, Take 1 tablet by mouth Daily., Disp: 90 tablet, Rfl: 3    esomeprazole (nexIUM) 20 MG capsule, Take 1 capsule by mouth Every Morning Before Breakfast., Disp: , Rfl:     famotidine (PEPCID) 40 MG tablet, , Disp: , Rfl:     lisinopril (PRINIVIL,ZESTRIL) 5 MG tablet, Take 1 tablet by mouth Daily., Disp: 90 tablet, Rfl: 3    nitroglycerin (NITROSTAT) 0.4 MG SL tablet, Place 1 tablet under the tongue Every 5 (Five) Minutes As Needed for Chest Pain. Take no more than 3 doses in 15 minutes., Disp: , Rfl:     sildenafil (Viagra) 100 MG tablet, Take 1 tablet by mouth Daily As Needed for Erectile Dysfunction., Disp: 10 tablet, Rfl: 2    simvastatin (ZOCOR) 20 MG tablet, Take 1 tablet by mouth Every Night., Disp: 90 tablet, Rfl: 3    Allergies   Allergen Reactions    Morphine Anaphylaxis     Patient states not allergic, had a reaction one time while in the hospital.  \"been 11 years ago\"       Family History   Problem Relation Age of Onset    Heart failure Mother     Heart failure Father     Heart disease Brother     Heart disease Brother     Heart attack Brother     No Known Problems Brother        Past Surgical History:   Procedure Laterality Date    CARDIAC CATHETERIZATION      CARDIAC CATHETERIZATION N/A 7/17/2020    Procedure: Left and Right Heart Cath with Coronary Angiography;  Surgeon: Talia Hickey MD;  Location: [facility] CATH INVASIVE LOC[facility];  Service: Cardiovascular;  Laterality: N/A;    CORONARY ANGIOPLASTY WITH STENT PLACEMENT  06/21/2010 " "   UMBILICAL HERNIA REPAIR         Past Medical History:   Diagnosis Date    Arthritis 2019    Solis esophagus 2018    Biliary dyskinesia 2019    Coronary artery disease     Diverticulosis 2018    Gastric reflux 2019    Hemorrhoids 2019    Hyperlipidemia 2019    Hypertension 2019       Family History   Problem Relation Age of Onset    Heart failure Mother     Heart failure Father     Heart disease Brother     Heart disease Brother     Heart attack Brother     No Known Problems Brother        Social History     Socioeconomic History    Marital status:     Number of children: 2   Tobacco Use    Smoking status: Former     Packs/day: 1.00     Years: 45.00     Additional pack years: 0.00     Total pack years: 45.00     Types: Cigarettes, Pipe     Quit date: 1978     Years since quittin.8     Passive exposure: Past    Smokeless tobacco: Never    Tobacco comments:     Smoked for 20yrs   Vaping Use    Vaping Use: Never used   Substance and Sexual Activity    Alcohol use: Never    Drug use: Never    Sexual activity: Yes     Partners: Female     Birth control/protection: Vasectomy           ECG 12 Lead    Date/Time: 10/16/2023 4:23 PM  Performed by: Talia Hickey MD    Authorized by: Talia Hickey MD  Comparison: compared with previous ECG   Similar to previous ECG  Comparison to previous ECG: Normal sinus rhythm normal ECG 72/min normal axis normal intervals no ectopy no significant change from previous EKG.        Objective:       Physical Exam    /82 (BP Location: Right arm, Patient Position: Sitting, Cuff Size: Adult)   Pulse 72   Ht 172.7 cm (68\")   Wt 90.3 kg (199 lb)   SpO2 95%   BMI 30.26 kg/m²   The patient is alert, oriented and in no distress.    Vital signs as noted above.    Head and neck revealed no carotid bruits or jugular venous distension.  No thyromegaly or lymphadenopathy is present.    Lungs clear.  No wheezing.  Breath sounds are " normal bilaterally.    Heart normal first and second heart sounds.  No murmur..  No pericardial rub is present.  No gallop is present.    Abdomen soft and nontender.  No organomegaly is present.    Extremities revealed good peripheral pulses without any pedal edema.    Skin warm and dry.    Musculoskeletal system is grossly normal.    CNS grossly normal.    Reviewed and updated.

## 2023-10-16 ENCOUNTER — OFFICE VISIT (OUTPATIENT)
Dept: CARDIOLOGY | Facility: CLINIC | Age: 73
End: 2023-10-16
Payer: MEDICARE

## 2023-10-16 VITALS
HEART RATE: 72 BPM | DIASTOLIC BLOOD PRESSURE: 82 MMHG | WEIGHT: 199 LBS | SYSTOLIC BLOOD PRESSURE: 130 MMHG | HEIGHT: 68 IN | OXYGEN SATURATION: 95 % | BODY MASS INDEX: 30.16 KG/M2

## 2023-10-16 DIAGNOSIS — Z95.5 S/P PRIMARY ANGIOPLASTY WITH CORONARY STENT: Primary | ICD-10-CM

## 2023-10-16 DIAGNOSIS — R06.02 SHORTNESS OF BREATH: ICD-10-CM

## 2023-10-16 DIAGNOSIS — E78.2 MIXED HYPERLIPIDEMIA: ICD-10-CM

## 2023-10-16 DIAGNOSIS — R53.83 TIREDNESS: ICD-10-CM

## 2023-10-16 DIAGNOSIS — I10 ESSENTIAL HYPERTENSION: ICD-10-CM

## 2023-10-16 DIAGNOSIS — Z95.820 STATUS POST ANGIOPLASTY WITH STENT: ICD-10-CM

## 2023-10-16 PROCEDURE — 99214 OFFICE O/P EST MOD 30 MIN: CPT | Performed by: INTERNAL MEDICINE

## 2023-10-16 PROCEDURE — 1160F RVW MEDS BY RX/DR IN RCRD: CPT | Performed by: INTERNAL MEDICINE

## 2023-10-16 PROCEDURE — 3075F SYST BP GE 130 - 139MM HG: CPT | Performed by: INTERNAL MEDICINE

## 2023-10-16 PROCEDURE — 93000 ELECTROCARDIOGRAM COMPLETE: CPT | Performed by: INTERNAL MEDICINE

## 2023-10-16 PROCEDURE — 1159F MED LIST DOCD IN RCRD: CPT | Performed by: INTERNAL MEDICINE

## 2023-10-16 PROCEDURE — 3079F DIAST BP 80-89 MM HG: CPT | Performed by: INTERNAL MEDICINE

## 2023-10-19 ENCOUNTER — PATIENT MESSAGE (OUTPATIENT)
Dept: FAMILY MEDICINE CLINIC | Facility: CLINIC | Age: 73
End: 2023-10-19
Payer: MEDICARE

## 2023-10-19 RX ORDER — CHLORCYCLIZINE HYDROCHLORIDE AND PSEUDOEPHEDRINE HYDROCHLORIDE 25; 60 MG/1; MG/1
0.5 TABLET ORAL 2 TIMES DAILY PRN
Qty: 30 TABLET | Refills: 1 | Status: SHIPPED | OUTPATIENT
Start: 2023-10-19 | End: 2023-10-20 | Stop reason: SDUPTHER

## 2023-10-19 RX ORDER — DOXYCYCLINE 100 MG/1
100 TABLET ORAL 2 TIMES DAILY
Qty: 20 TABLET | Refills: 0 | Status: SHIPPED | OUTPATIENT
Start: 2023-10-19 | End: 2023-10-20 | Stop reason: SDUPTHER

## 2023-10-20 ENCOUNTER — TELEPHONE (OUTPATIENT)
Dept: FAMILY MEDICINE CLINIC | Facility: CLINIC | Age: 73
End: 2023-10-20
Payer: MEDICARE

## 2023-10-20 RX ORDER — DOXYCYCLINE 100 MG/1
100 TABLET ORAL 2 TIMES DAILY
Qty: 20 TABLET | Refills: 0 | Status: SHIPPED | OUTPATIENT
Start: 2023-10-20 | End: 2023-10-30

## 2023-10-20 RX ORDER — CHLORCYCLIZINE HYDROCHLORIDE AND PSEUDOEPHEDRINE HYDROCHLORIDE 25; 60 MG/1; MG/1
0.5 TABLET ORAL 2 TIMES DAILY PRN
Qty: 30 TABLET | Refills: 1 | Status: SHIPPED | OUTPATIENT
Start: 2023-10-20

## 2023-10-20 NOTE — TELEPHONE ENCOUNTER
Pt states that [facility] does not have the prescriptions Dr Hanna sent in, can we please resend those to [facility]?  Thank you!

## 2023-11-10 RX ORDER — LISINOPRIL 5 MG/1
5 TABLET ORAL DAILY
Qty: 90 TABLET | Refills: 3 | Status: SHIPPED | OUTPATIENT
Start: 2023-11-10

## 2023-11-10 RX ORDER — CLOPIDOGREL BISULFATE 75 MG/1
75 TABLET ORAL DAILY
Qty: 90 TABLET | Refills: 3 | Status: SHIPPED | OUTPATIENT
Start: 2023-11-10

## 2023-11-10 RX ORDER — SIMVASTATIN 20 MG
20 TABLET ORAL NIGHTLY
Qty: 90 TABLET | Refills: 3 | Status: SHIPPED | OUTPATIENT
Start: 2023-11-10

## 2024-03-18 RX ORDER — SIMVASTATIN 20 MG
20 TABLET ORAL NIGHTLY
Qty: 90 TABLET | Refills: 3 | Status: SHIPPED | OUTPATIENT
Start: 2024-03-18

## 2024-03-18 RX ORDER — LISINOPRIL 5 MG/1
5 TABLET ORAL DAILY
Qty: 90 TABLET | Refills: 3 | Status: SHIPPED | OUTPATIENT
Start: 2024-03-18

## 2024-03-18 NOTE — TELEPHONE ENCOUNTER
Rx Refill Note  Requested Prescriptions     Pending Prescriptions Disp Refills    lisinopril (PRINIVIL,ZESTRIL) 5 MG tablet 90 tablet 3     Sig: Take 1 tablet by mouth Daily.    simvastatin (ZOCOR) 20 MG tablet 90 tablet 3     Sig: Take 1 tablet by mouth Every Night.      Last office visit with prescribing clinician: 10/16/2023   Last telemedicine visit with prescribing clinician: Visit date not found   Next office visit with prescribing clinician: 4/23/2024                         Would you like a call back once the refill request has been completed: [] Yes [] No    If the office needs to give you a call back, can they leave a voicemail: [] Yes [] No    Jasmin Coleman MA  03/18/24, 11:17 EDT

## 2024-03-22 ENCOUNTER — PATIENT MESSAGE (OUTPATIENT)
Dept: FAMILY MEDICINE CLINIC | Facility: CLINIC | Age: 74
End: 2024-03-22

## 2024-04-05 NOTE — PROGRESS NOTES
Encounter Date:04/23/2024  Last seen 10/16/2023      Patient ID: Evaristo Barlow is a 73 y.o. male.    Chief Complaint:     Status post stent  Hypertension  Dyslipidemia        History of Present Illness  Since I have last seen, the patient has been without any chest discomfort ,shortness of breath, palpitations, dizziness or syncope.  Denies having any headache ,abdominal pain ,nausea, vomiting , diarrhea constipation, loss of weight or loss of appetite.  Denies having any excessive bruising ,hematuria or blood in the stool.    Review of all systems negative except as indicated.    Reviewed ROS.  Assessment and Plan         [[[[[[[[[[[[[[[[[[[[[  History  ==============  -Shortness of breath and tiredness-better     Echocardiogram 7/14/2020  Mild aortic valve stenosis.  Gradient of 14 mmHg mean gradient 7 mmHg  Left ventricular size and contractility is normal with ejection fraction of 60%     Stress Cardiolite test-7/14/2020-abnormal with proximal inferior ischemia     Cardiac catheterization 7/17/2020   No evidence for pulmonary hypertension is present.  Normal left ventricular size and contractility with ejection fraction of 60%.  Left main coronary artery is normal.  Left anterior descending artery normal.  Diagonal branch stent is patent with 50% disease in the midsegment of the stent.  Circumflex coronary artery normal.  Right coronary artery is a large and dominant vessel and is normal.     @@Please note RCA could be selectively engaged using JR4 catheter.  In the past 3D RC catheter was used.@@     -Tingling in the left side of the face and neck.      -status post stent to diagonal branch 06/21/2010 .    Stress Cardiolite test 06/15/2017 revealed reproducible chest discomfort 1.5 mm of inferior and anterolateral ST depression and inferior and apical moderate to significant ischemia.     Cardiac catheterization 06/21/2017 revealed normal left ventricular function diagonal branch stent was patent.  No  significant disease was present. Please note RCA was engaged using 3D RC catheter.     - chest discomfort Extreme weakness and tiredness -Improved since lisinopril  was discontinued.      -hypertension and dyslipidemia     -recently diagnosed Solis's esophagitis      -strong family history of coronary artery disease      -former smoker      -allergy to penicillin.  ================    Plan  ============  Status post stent  Patient is not having any angina pectoris or congestive heart failure.  EKG showed sinus bradycardia without any ischemic changes-10/16/2023  EKG 4/23/2024 sinus rhythm premature ventricular contraction     @@(please note RCA was injected using 3D RCA catheter.)@@     Hypertension- well-controlled.  130/82  Continue lisinopril 5 mg a day      Dyslipidemia-continue simvastatin     Medications were reviewed and updated.     Follow-up in the office in 6 months.     Further plan will depend on patient's progress.     Reviewed and updated 4/23/2024.  [[[[[[[[[[[[[[[[[[[[[[[[[[               Diagnosis Plan   1. S/P primary angioplasty with coronary stent        2. Mixed hyperlipidemia        3. Status post angioplasty with stent        4. Shortness of breath        5. Primary hypertension        6. Dyslipidemia        LAB RESULTS (LAST 7 DAYS)    CBC        BMP        CMP         BNP        TROPONIN        CoAg        Creatinine Clearance  CrCl cannot be calculated (Patient's most recent lab result is older than the maximum 30 days allowed.).    ABG        Radiology  No radiology results for the last day                The following portions of the patient's history were reviewed and updated as appropriate: allergies, current medications, past family history, past medical history, past social history, past surgical history, and problem list.    Review of Systems   Constitutional: Negative for malaise/fatigue.   Cardiovascular:  Negative for chest pain, dyspnea on exertion, leg swelling and  "palpitations.   Respiratory:  Negative for cough and shortness of breath.    Gastrointestinal:  Negative for abdominal pain, nausea and vomiting.   Neurological:  Negative for dizziness, focal weakness, headaches, light-headedness and numbness.   All other systems reviewed and are negative.      Current Outpatient Medications:   •  aspirin 325 MG tablet, Take 1 tablet by mouth every night at bedtime., Disp: , Rfl:   •  celecoxib (CeleBREX) 200 MG capsule, Take 1 capsule by mouth Daily., Disp: 30 capsule, Rfl: 2  •  Chlorcyclizine-Pseudoephed (Stahist AD) 25-60 MG tablet, Take 0.5 tablets by mouth 2 (Two) Times a Day As Needed (congestion, drainage)., Disp: 30 tablet, Rfl: 1  •  clopidogrel (PLAVIX) 75 MG tablet, TAKE 1 TABLET DAILY, Disp: 90 tablet, Rfl: 3  •  esomeprazole (nexIUM) 20 MG capsule, Take 1 capsule by mouth Every Morning Before Breakfast., Disp: , Rfl:   •  famotidine (PEPCID) 40 MG tablet, , Disp: , Rfl:   •  lisinopril (PRINIVIL,ZESTRIL) 5 MG tablet, Take 1 tablet by mouth Daily., Disp: 90 tablet, Rfl: 3  •  nitroglycerin (NITROSTAT) 0.4 MG SL tablet, Place 1 tablet under the tongue Every 5 (Five) Minutes As Needed for Chest Pain. Take no more than 3 doses in 15 minutes., Disp: , Rfl:   •  sildenafil (Viagra) 100 MG tablet, Take 1 tablet by mouth Daily As Needed for Erectile Dysfunction., Disp: 10 tablet, Rfl: 2  •  simvastatin (ZOCOR) 20 MG tablet, Take 1 tablet by mouth Every Night., Disp: 90 tablet, Rfl: 3    Allergies   Allergen Reactions   • Morphine Anaphylaxis     Patient states not allergic, had a reaction one time while in the hospital.  \"been 11 years ago\"       Family History   Problem Relation Age of Onset   • Heart failure Mother    • Heart failure Father    • Heart disease Brother    • Heart disease Brother    • Heart attack Brother    • No Known Problems Brother        Past Surgical History:   Procedure Laterality Date   • CARDIAC CATHETERIZATION     • CARDIAC CATHETERIZATION N/A " 2020    Procedure: Left and Right Heart Cath with Coronary Angiography;  Surgeon: Talia Hickey MD;  Location: [facility] CATH INVASIVE LOC[facility];  Service: Cardiovascular;  Laterality: N/A;   • CORONARY ANGIOPLASTY WITH STENT PLACEMENT  2010   • UMBILICAL HERNIA REPAIR         Past Medical History:   Diagnosis Date   • Arthritis 2019   • Solis esophagus 2018   • Biliary dyskinesia 2019   • Coronary artery disease    • Diverticulosis 2018   • Gastric reflux 2019   • Hemorrhoids 2019   • Hyperlipidemia 2019   • Hypertension 2019       Family History   Problem Relation Age of Onset   • Heart failure Mother    • Heart failure Father    • Heart disease Brother    • Heart disease Brother    • Heart attack Brother    • No Known Problems Brother        Social History     Socioeconomic History   • Marital status:    • Number of children: 2   Tobacco Use   • Smoking status: Former     Current packs/day: 0.00     Average packs/day: 1 pack/day for 45.0 years (45.0 ttl pk-yrs)     Types: Cigarettes, Pipe     Start date: 1933     Quit date: 1978     Years since quittin.2     Passive exposure: Past   • Smokeless tobacco: Never   • Tobacco comments:     Smoked for 20yrs   Vaping Use   • Vaping status: Never Used   Substance and Sexual Activity   • Alcohol use: Never   • Drug use: Never   • Sexual activity: Yes     Partners: Female     Birth control/protection: Vasectomy           ECG 12 Lead    Date/Time: 2024 11:02 AM  Performed by: Talia Hickey MD    Authorized by: Talia Hickey MD  Comparison: compared with previous ECG   Similar to previous ECG  Comparison to previous ECG: Sinus bradycardia premature ventricular contraction normal axis normal intervals 59/min no significant change from previous EKG.        Objective:       Physical Exam    There were no vitals taken for this visit.  The patient is alert, oriented and in no distress.    Vital  signs as noted above.    Head and neck revealed no carotid bruits or jugular venous distension.  No thyromegaly or lymphadenopathy is present.    Lungs clear.  No wheezing.  Breath sounds are normal bilaterally.    Heart normal first and second heart sounds.  No murmur..  No pericardial rub is present.  No gallop is present.    Abdomen soft and nontender.  No organomegaly is present.    Extremities revealed good peripheral pulses without any pedal edema.    Skin warm and dry.    Musculoskeletal system is grossly normal.    CNS grossly normal.    Reviewed and updated.

## 2024-04-23 ENCOUNTER — OFFICE VISIT (OUTPATIENT)
Dept: CARDIOLOGY | Facility: CLINIC | Age: 74
End: 2024-04-23
Payer: MEDICARE

## 2024-04-23 VITALS
BODY MASS INDEX: 31.07 KG/M2 | OXYGEN SATURATION: 97 % | HEIGHT: 68 IN | HEART RATE: 77 BPM | WEIGHT: 205 LBS | DIASTOLIC BLOOD PRESSURE: 80 MMHG | SYSTOLIC BLOOD PRESSURE: 132 MMHG

## 2024-04-23 DIAGNOSIS — Z95.5 S/P PRIMARY ANGIOPLASTY WITH CORONARY STENT: Primary | Chronic | ICD-10-CM

## 2024-04-23 DIAGNOSIS — I10 PRIMARY HYPERTENSION: ICD-10-CM

## 2024-04-23 DIAGNOSIS — E78.2 MIXED HYPERLIPIDEMIA: Chronic | ICD-10-CM

## 2024-04-23 DIAGNOSIS — E78.5 DYSLIPIDEMIA: ICD-10-CM

## 2024-04-23 DIAGNOSIS — Z95.820 STATUS POST ANGIOPLASTY WITH STENT: ICD-10-CM

## 2024-04-23 DIAGNOSIS — R06.02 SHORTNESS OF BREATH: ICD-10-CM

## 2024-04-23 RX ORDER — CLOPIDOGREL BISULFATE 75 MG/1
75 TABLET ORAL DAILY
Qty: 90 TABLET | Refills: 3 | Status: SHIPPED | OUTPATIENT
Start: 2024-04-23

## 2024-04-23 RX ORDER — SIMVASTATIN 20 MG
20 TABLET ORAL NIGHTLY
Qty: 90 TABLET | Refills: 3 | Status: SHIPPED | OUTPATIENT
Start: 2024-04-23

## 2024-04-23 RX ORDER — CLOBETASOL PROPIONATE 0.5 MG/G
OINTMENT TOPICAL
COMMUNITY
Start: 2024-04-04

## 2024-04-23 RX ORDER — LISINOPRIL 5 MG/1
5 TABLET ORAL DAILY
Qty: 90 TABLET | Refills: 3 | Status: SHIPPED | OUTPATIENT
Start: 2024-04-23

## 2024-05-19 ENCOUNTER — PATIENT MESSAGE (OUTPATIENT)
Dept: FAMILY MEDICINE CLINIC | Facility: CLINIC | Age: 74
End: 2024-05-19

## 2024-07-11 RX ORDER — SILDENAFIL 100 MG/1
100 TABLET, FILM COATED ORAL DAILY PRN
Qty: 10 TABLET | Refills: 2 | Status: SHIPPED | OUTPATIENT
Start: 2024-07-11

## 2024-08-22 ENCOUNTER — OFFICE VISIT (OUTPATIENT)
Dept: FAMILY MEDICINE CLINIC | Facility: CLINIC | Age: 74
End: 2024-08-22
Payer: MEDICARE

## 2024-08-22 ENCOUNTER — LAB (OUTPATIENT)
Dept: LAB | Facility: HOSPITAL | Age: 74
End: 2024-08-22
Payer: MEDICARE

## 2024-08-22 VITALS
SYSTOLIC BLOOD PRESSURE: 163 MMHG | WEIGHT: 213 LBS | HEIGHT: 68 IN | HEART RATE: 65 BPM | DIASTOLIC BLOOD PRESSURE: 86 MMHG | OXYGEN SATURATION: 95 % | BODY MASS INDEX: 32.28 KG/M2

## 2024-08-22 DIAGNOSIS — M19.90 ARTHRITIS: Chronic | ICD-10-CM

## 2024-08-22 DIAGNOSIS — Z12.5 ENCOUNTER FOR SPECIAL SCREENING EXAMINATION FOR NEOPLASM OF PROSTATE: ICD-10-CM

## 2024-08-22 DIAGNOSIS — E78.5 DYSLIPIDEMIA: ICD-10-CM

## 2024-08-22 DIAGNOSIS — I10 PRIMARY HYPERTENSION: ICD-10-CM

## 2024-08-22 DIAGNOSIS — Z00.00 MEDICARE ANNUAL WELLNESS VISIT, SUBSEQUENT: Primary | ICD-10-CM

## 2024-08-22 LAB
ALBUMIN SERPL-MCNC: 4.5 G/DL (ref 3.5–5.2)
ALBUMIN/GLOB SERPL: 2.4 G/DL
ALP SERPL-CCNC: 70 U/L (ref 39–117)
ALT SERPL W P-5'-P-CCNC: 22 U/L (ref 1–41)
ANION GAP SERPL CALCULATED.3IONS-SCNC: 10 MMOL/L (ref 5–15)
AST SERPL-CCNC: 21 U/L (ref 1–40)
BASOPHILS # BLD AUTO: 0.04 10*3/MM3 (ref 0–0.2)
BASOPHILS NFR BLD AUTO: 0.7 % (ref 0–1.5)
BILIRUB SERPL-MCNC: 1.2 MG/DL (ref 0–1.2)
BUN SERPL-MCNC: 15 MG/DL (ref 8–23)
BUN/CREAT SERPL: 17.6 (ref 7–25)
CALCIUM SPEC-SCNC: 9.6 MG/DL (ref 8.6–10.5)
CHLORIDE SERPL-SCNC: 102 MMOL/L (ref 98–107)
CHOLEST SERPL-MCNC: 126 MG/DL (ref 0–200)
CO2 SERPL-SCNC: 26 MMOL/L (ref 22–29)
CREAT SERPL-MCNC: 0.85 MG/DL (ref 0.76–1.27)
DEPRECATED RDW RBC AUTO: 40.1 FL (ref 37–54)
EGFRCR SERPLBLD CKD-EPI 2021: 90.6 ML/MIN/1.73
EOSINOPHIL # BLD AUTO: 0.06 10*3/MM3 (ref 0–0.4)
EOSINOPHIL NFR BLD AUTO: 1.1 % (ref 0.3–6.2)
ERYTHROCYTE [DISTWIDTH] IN BLOOD BY AUTOMATED COUNT: 13.3 % (ref 12.3–15.4)
GLOBULIN UR ELPH-MCNC: 1.9 GM/DL
GLUCOSE SERPL-MCNC: 96 MG/DL (ref 65–99)
HCT VFR BLD AUTO: 48.8 % (ref 37.5–51)
HDLC SERPL-MCNC: 32 MG/DL (ref 40–60)
HGB BLD-MCNC: 16 G/DL (ref 13–17.7)
IMM GRANULOCYTES # BLD AUTO: 0.03 10*3/MM3 (ref 0–0.05)
IMM GRANULOCYTES NFR BLD AUTO: 0.6 % (ref 0–0.5)
LDLC SERPL CALC-MCNC: 73 MG/DL (ref 0–100)
LDLC/HDLC SERPL: 2.24 {RATIO}
LYMPHOCYTES # BLD AUTO: 1.21 10*3/MM3 (ref 0.7–3.1)
LYMPHOCYTES NFR BLD AUTO: 22.2 % (ref 19.6–45.3)
MCH RBC QN AUTO: 27.2 PG (ref 26.6–33)
MCHC RBC AUTO-ENTMCNC: 32.8 G/DL (ref 31.5–35.7)
MCV RBC AUTO: 82.9 FL (ref 79–97)
MONOCYTES # BLD AUTO: 0.55 10*3/MM3 (ref 0.1–0.9)
MONOCYTES NFR BLD AUTO: 10.1 % (ref 5–12)
NEUTROPHILS NFR BLD AUTO: 3.56 10*3/MM3 (ref 1.7–7)
NEUTROPHILS NFR BLD AUTO: 65.3 % (ref 42.7–76)
NRBC BLD AUTO-RTO: 0 /100 WBC (ref 0–0.2)
PLATELET # BLD AUTO: 133 10*3/MM3 (ref 140–450)
PMV BLD AUTO: 12.3 FL (ref 6–12)
POTASSIUM SERPL-SCNC: 4.1 MMOL/L (ref 3.5–5.2)
PROT SERPL-MCNC: 6.4 G/DL (ref 6–8.5)
PSA SERPL-MCNC: 1.08 NG/ML (ref 0–4)
RBC # BLD AUTO: 5.89 10*6/MM3 (ref 4.14–5.8)
SODIUM SERPL-SCNC: 138 MMOL/L (ref 136–145)
TRIGL SERPL-MCNC: 112 MG/DL (ref 0–150)
VLDLC SERPL-MCNC: 21 MG/DL (ref 5–40)
WBC NRBC COR # BLD AUTO: 5.45 10*3/MM3 (ref 3.4–10.8)

## 2024-08-22 PROCEDURE — 36415 COLL VENOUS BLD VENIPUNCTURE: CPT | Performed by: FAMILY MEDICINE

## 2024-08-22 PROCEDURE — 80053 COMPREHEN METABOLIC PANEL: CPT | Performed by: FAMILY MEDICINE

## 2024-08-22 PROCEDURE — 85025 COMPLETE CBC W/AUTO DIFF WBC: CPT | Performed by: FAMILY MEDICINE

## 2024-08-22 PROCEDURE — 80061 LIPID PANEL: CPT | Performed by: FAMILY MEDICINE

## 2024-08-22 PROCEDURE — G0103 PSA SCREENING: HCPCS | Performed by: FAMILY MEDICINE

## 2024-08-22 NOTE — PROGRESS NOTES
Subjective   The ABCs of the Annual Wellness Visit  Medicare Wellness Visit      Evaristo Barlow is a 73 y.o. patient who presents for a Medicare Wellness Visit.    The following portions of the patient's history were reviewed and   updated as appropriate: allergies, current medications, past family history, past medical history, past social history, past surgical history, and problem list.    Compared to one year ago, the patient's physical   health is worse.  Compared to one year ago, the patient's mental   health is the same.    Recent Hospitalizations:  He was not admitted to the hospital during the last year.     Current Medical Providers:  Patient Care Team:  Denzel Rashid MD as PCP - General (Family Medicine)  Talia Hickey MD as Consulting Physician (Cardiology)  Joann Aparicio APRN as Nurse Practitioner (Family Medicine)    Outpatient Medications Prior to Visit   Medication Sig Dispense Refill    clopidogrel (PLAVIX) 75 MG tablet Take 1 tablet by mouth Daily. 90 tablet 3    esomeprazole (nexIUM) 20 MG capsule Take 1 capsule by mouth Every Morning Before Breakfast.      lisinopril (PRINIVIL,ZESTRIL) 5 MG tablet Take 1 tablet by mouth Daily. 90 tablet 3    simvastatin (ZOCOR) 20 MG tablet Take 1 tablet by mouth Every Night. 90 tablet 3    nitroglycerin (NITROSTAT) 0.4 MG SL tablet Place 1 tablet under the tongue Every 5 (Five) Minutes As Needed for Chest Pain. Take no more than 3 doses in 15 minutes.      aspirin 325 MG tablet Take 1 tablet by mouth every night at bedtime.      celecoxib (CeleBREX) 200 MG capsule Take 1 capsule by mouth Daily. 30 capsule 2    Chlorcyclizine-Pseudoephed (Stahist AD) 25-60 MG tablet Take 0.5 tablets by mouth 2 (Two) Times a Day As Needed (congestion, drainage). 30 tablet 1    clobetasol (TEMOVATE) 0.05 % ointment       famotidine (PEPCID) 40 MG tablet       sildenafil (VIAGRA) 100 MG tablet TAKE ONE TABLET BY MOUTH DAILY AS NEEDED FOR ERECTILE DYSFUNCTION 10  "tablet 2     No facility-administered medications prior to visit.     No opioid medication identified on active medication list. I have reviewed chart for other potential  high risk medication/s and harmful drug interactions in the elderly.      Aspirin is on active medication list. Aspirin use is indicated based on review of current medical condition/s. Pros and cons of this therapy have been discussed today. Benefits of this medication outweigh potential harm.  Patient has been encouraged to continue taking this medication.  .      Patient Active Problem List   Diagnosis    High blood pressure    Arthritis    S/P primary angioplasty with coronary stent    Hyperlipidemia    Gastric reflux    CAD (coronary artery disease)    Solis's esophagus without dysplasia    Diverticulosis    Hemorrhoids    Precordial pain    Abnormal cardiovascular stress test    Anemia    Dizziness    Dyslipidemia    Elevated blood pressure reading without diagnosis of hypertension    Family history of coronary artery disease    Impaired glucose tolerance    Abnormal nuclear stress test    Shortness of breath    Status post angioplasty with stent    Epigastric pain    Eructation    Functional dyspepsia    Hyperbilirubinemia    Personal history of colonic polyps     Advance Care Planning Advance Directive is not on file.  ACP discussion was held with the patient during this visit. Patient has an advance directive (not in EMR), copy requested.            Objective   Vitals:    08/22/24 0923   BP: 163/86   BP Location: Left arm   Patient Position: Sitting   Cuff Size: Large Adult   Pulse: 65   SpO2: 95%   Weight: 96.6 kg (213 lb)   Height: 172.7 cm (68\")   PainSc: 0-No pain       Estimated body mass index is 32.39 kg/m² as calculated from the following:    Height as of this encounter: 172.7 cm (68\").    Weight as of this encounter: 96.6 kg (213 lb).    BMI is >= 30 and <35. (Class 1 Obesity). The following options were offered after " discussion;: exercise counseling/recommendations and nutrition counseling/recommendations       Does the patient have evidence of cognitive impairment? No  Lab Results   Component Value Date    TRIG 112 2024    HDL 32 (L) 2024    LDL 73 2024    VLDL 21 2024                                                                                                Health  Risk Assessment    Smoking Status:  Social History     Tobacco Use   Smoking Status Former    Current packs/day: 0.00    Types: Cigarettes, Pipe    Quit date: 1978    Years since quittin.6    Passive exposure: Past   Smokeless Tobacco Never   Tobacco Comments    Smoked for 20yrs     Alcohol Consumption:  Social History     Substance and Sexual Activity   Alcohol Use Never       Fall Risk Screen  STEADI Fall Risk Assessment was completed, and patient is at HIGH risk for falls. Assessment completed on:2024    Depression Screenin/22/2024     9:27 AM   PHQ-2/PHQ-9 Depression Screening   Little Interest or Pleasure in Doing Things 0-->not at all   Feeling Down, Depressed or Hopeless 0-->not at all   PHQ-9: Brief Depression Severity Measure Score 0     Health Habits and Functional and Cognitive Screenin/22/2024     9:25 AM   Functional & Cognitive Status   Do you have difficulty preparing food and eating? No   Do you have difficulty bathing yourself, getting dressed or grooming yourself? No   Do you have difficulty using the toilet? No   Do you have difficulty moving around from place to place? No   Do you have trouble with steps or getting out of a bed or a chair? Yes   Current Diet Unhealthy Diet   Dental Exam Not up to date   Eye Exam Up to date   Do you need help using the phone?  No   Are you deaf or do you have serious difficulty hearing?  Yes   Do you need help to go to places out of walking distance? No   Do you need help shopping? No   Do you need help preparing meals?  No   Do you need help with  housework?  No   Do you need help with laundry? No   Do you need help taking your medications? No   Do you need help managing money? No   Do you ever drive or ride in a car without wearing a seat belt? No   Have you felt unusual stress, anger or loneliness in the last month? No   Who do you live with? Spouse   If you need help, do you have trouble finding someone available to you? No   Have you been bothered in the last four weeks by sexual problems? No   Do you have difficulty concentrating, remembering or making decisions? No           Age-appropriate Screening Schedule:  Refer to the list below for future screening recommendations based on patient's age, sex and/or medical conditions. Orders for these recommended tests are listed in the plan section. The patient has been provided with a written plan.    Health Maintenance List  Health Maintenance   Topic Date Due    COVID-19 Vaccine (7 - 2023-24 season) 03/01/2024    ANNUAL WELLNESS VISIT  08/22/2024    BMI FOLLOWUP  08/22/2024    INFLUENZA VACCINE  08/01/2024    TDAP/TD VACCINES (1 - Tdap) 08/22/2025 (Originally 11/4/1969)    LIPID PANEL  08/22/2025    COLORECTAL CANCER SCREENING  04/25/2033    RSV Vaccine - Adults  Completed    Pneumococcal Vaccine 65+  Completed    AAA SCREEN (ONE-TIME)  Completed    ZOSTER VACCINE  Completed    HEPATITIS C SCREENING  Addressed                                                                                                                                                CMS Preventative Services Quick Reference  Risk Factors Identified During Encounter  Immunizations Discussed/Encouraged: Influenza and COVID19  Dental Screening Recommended  Vision Screening Recommended    The above risks/problems have been discussed with the patient.  Pertinent information has been shared with the patient in the After Visit Summary.  An After Visit Summary and PPPS were made available to the patient.    Follow Up:   Next Medicare Wellness visit  "to be scheduled in 1 year.         Additional E&M Note during same encounter follows:  Patient has additional, significant, and separately identifiable condition(s)/problem(s) that require work above and beyond the Medicare Wellness Visit     Chief Complaint  Medicare Wellness-subsequent    Subjective   HPI  Evaristo is also being seen today for an annual adult preventative physical exam.     Review of Systems   Constitutional:  Positive for activity change and fatigue.   HENT:  Negative for congestion, facial swelling, nosebleeds, postnasal drip, rhinorrhea, tinnitus and trouble swallowing.    Eyes:  Negative for visual disturbance.   Respiratory:  Negative for cough, shortness of breath and wheezing.    Cardiovascular:  Negative for chest pain and leg swelling.   Gastrointestinal:  Negative for abdominal pain, constipation, diarrhea, nausea and vomiting.   Genitourinary:  Negative for difficulty urinating, frequency and urgency.   Musculoskeletal:  Positive for arthralgias. Negative for back pain and neck pain.   Skin:  Negative for rash.   Neurological:  Negative for dizziness, syncope, weakness, light-headedness, numbness and confusion.   Hematological:  Does not bruise/bleed easily.   Psychiatric/Behavioral:  Negative for decreased concentration, sleep disturbance and suicidal ideas. The patient is not nervous/anxious.               Objective   Vital Signs:  /86 (BP Location: Left arm, Patient Position: Sitting, Cuff Size: Large Adult)   Pulse 65   Ht 172.7 cm (68\")   Wt 96.6 kg (213 lb)   SpO2 95%   BMI 32.39 kg/m²   Physical Exam  Vitals and nursing note reviewed.   Constitutional:       Appearance: He is obese.   HENT:      Right Ear: Tympanic membrane and ear canal normal.      Left Ear: Tympanic membrane and ear canal normal.   Cardiovascular:      Rate and Rhythm: Normal rate and regular rhythm.      Heart sounds: Normal heart sounds. No murmur heard.  Pulmonary:      Effort: Pulmonary effort is " normal.      Breath sounds: No wheezing or rales.   Abdominal:      General: Bowel sounds are normal.      Palpations: Abdomen is soft.      Tenderness: There is no abdominal tenderness. There is no guarding.   Musculoskeletal:      Cervical back: Neck supple.      Right lower leg: No edema.      Left lower leg: No edema.   Lymphadenopathy:      Cervical: No cervical adenopathy.   Neurological:      General: No focal deficit present.      Mental Status: He is alert and oriented to person, place, and time.   Psychiatric:         Mood and Affect: Mood normal.         The following data was reviewed by: Denzel Rashid MD on 08/22/2024:        Assessment and Plan Additional age appropriate preventative wellness advice topics were discussed during today's preventative wellness exam(some topics already addressed during AWV portion of the note above):    Physical Activity: Advised cardiovascular activity 150 minutes per week as tolerated. (example brisk walk for 30 minutes, 5 days a week).     Nutrition: Discussed nutrition plan with patient. Information shared in after visit summary. Goal is for a well balanced diet to enhance overall health.     Healthy Weight: Discussed current and goal BMI with patient. Steps to attain this goal discussed. Information shared in after visit summary.              Encounter for special screening examination for neoplasm of prostate    Medicare annual wellness visit, subsequent    Primary hypertension  Hypertension is stable and controlled  Continue current treatment regimen.  Blood pressure will be reassessed in 6 months.  Dyslipidemia    Arthritis      Orders Placed This Encounter   Procedures    Comprehensive Metabolic Panel     Order Specific Question:   Release to patient     Answer:   Routine Release [[id number]]    Lipid Panel     Order Specific Question:   Release to patient     Answer:   Routine Release [[id number]]    PSA Screen     Order Specific Question:   Release to  patient     Answer:   Routine Release [[id number]]    CBC Auto Differential     Order Specific Question:   Release to patient     Answer:   Routine Release [[id number]]    COGNITIVE ASSESSMENT SCAN    CBC & Differential     Order Specific Question:   Manual Differential     Answer:   No     Order Specific Question:   Release to patient     Answer:   Routine Release [[id number]]           I spent 30 minutes caring for Evaristo on this date of service. This time includes time spent by me in the following activities:preparing for the visit, obtaining and/or reviewing a separately obtained history, performing a medically appropriate examination and/or evaluation , counseling and educating the patient/family/caregiver, ordering medications, tests, or procedures, and documenting information in the medical record  Follow Up   Return in about 6 months (around 2/22/2025) for Recheck.  Patient was given instructions and counseling regarding his condition or for health maintenance advice. Please see specific information pulled into the AVS if appropriate.    We discussed changes in diet and exercise  He needs to be more active to build his energy  I will check some labs to make sure there is nothing holding him back medically  I asked him to see me again in 6 mos   I advised flu and Covid vaccines this fall

## 2024-10-21 NOTE — PROGRESS NOTES
Encounter Date:10/24/2024  Last seen 4/23/2024      Patient ID: Evaristo Barlow is a 73 y.o. male.      Chief Complaint:     Status post stent  Hypertension  Dyslipidemia        History of Present Illness  Since I have last seen, the patient has been without any chest discomfort ,shortness of breath, palpitations, dizziness or syncope.  Denies having any headache ,abdominal pain ,nausea, vomiting , diarrhea constipation, loss of weight or loss of appetite.  Denies having any excessive bruising ,hematuria or blood in the stool.    Review of all systems negative except as indicated.    Reviewed ROS..  Assessment and Plan         [[[[[[[[[[[[[[[[[[[[[  History  ==============   -status post stent to diagonal branch 06/21/2010 .    -Shortness of breath and tiredness-better     Echocardiogram 7/14/2020  Mild aortic valve stenosis.  Gradient of 14 mmHg mean gradient 7 mmHg  Left ventricular size and contractility is normal with ejection fraction of 60%     Stress Cardiolite test-7/14/2020-abnormal with proximal inferior ischemia     Cardiac catheterization 7/17/2020   No evidence for pulmonary hypertension is present.  Normal left ventricular size and contractility with ejection fraction of 60%.  Left main coronary artery is normal.  Left anterior descending artery normal.  Diagonal branch stent is patent with 50% disease in the midsegment of the stent.  Circumflex coronary artery normal.  Right coronary artery is a large and dominant vessel and is normal.     @@Please note RCA could be selectively engaged using JR4 catheter.  In the past 3D RC catheter was used.@@       Cardiac catheterization 06/21/2017 revealed normal left ventricular function diagonal branch stent was patent.  No significant disease was present. Please note RCA was engaged using 3D RC catheter.     - chest discomfort Extreme weakness and tiredness -Improved since lisinopril  was discontinued.      -hypertension and dyslipidemia     -recently  diagnosed Solis's esophagitis      -strong family history of coronary artery disease      -former smoker      -allergy to penicillin.  ================    Plan  ============  Status post stent  Patient is not having any angina pectoris or congestive heart failure.  EKG showed sinus bradycardia without any ischemic changes-10/16/2023  EKG 4/23/2024 sinus rhythm premature ventricular contraction  EKG 10/24/2024-sinus rhythm without ischemic changes      Hypertension  145/84.  Mild elevation of systolic blood pressure.  Maintain blood pressure log.  Continue lisinopril 5 mg a day    Weight reduction.      Dyslipidemia-continue simvastatin     Medications were reviewed and updated.     Follow-up in the office in 6 months.     Further plan will depend on patient's progress.     Reviewed and updated 10/24/2024.  [[[[[[[[[[[[[[[[[[[[[[[[[[                 Diagnosis Plan   1. S/P primary angioplasty with coronary stent        2. Shortness of breath        3. Mixed hyperlipidemia        4. Status post angioplasty with stent        5. Dyslipidemia        6. Primary hypertension        7. Essential hypertension        LAB RESULTS (LAST 7 DAYS)    CBC        BMP        CMP         BNP        TROPONIN        CoAg        Creatinine Clearance  CrCl cannot be calculated (Patient's most recent lab result is older than the maximum 30 days allowed.).    ABG        Radiology  No radiology results for the last day                The following portions of the patient's history were reviewed and updated as appropriate: allergies, current medications, past family history, past medical history, past social history, past surgical history, and problem list.    Review of Systems   Constitutional: Negative for malaise/fatigue.   Cardiovascular:  Negative for chest pain, dyspnea on exertion, leg swelling and palpitations.   Respiratory:  Negative for cough and shortness of breath.    Gastrointestinal:  Negative for abdominal pain, nausea and  "vomiting.   Neurological:  Positive for dizziness and light-headedness. Negative for focal weakness, headaches and numbness.   All other systems reviewed and are negative.      Current Outpatient Medications:     clopidogrel (PLAVIX) 75 MG tablet, Take 1 tablet by mouth Daily., Disp: 90 tablet, Rfl: 3    esomeprazole (nexIUM) 20 MG capsule, Take 1 capsule by mouth Every Morning Before Breakfast., Disp: , Rfl:     lisinopril (PRINIVIL,ZESTRIL) 5 MG tablet, Take 1 tablet by mouth Daily., Disp: 90 tablet, Rfl: 3    nitroglycerin (NITROSTAT) 0.4 MG SL tablet, Place 1 tablet under the tongue Every 5 (Five) Minutes As Needed for Chest Pain. Take no more than 3 doses in 15 minutes., Disp: , Rfl:     simvastatin (ZOCOR) 20 MG tablet, Take 1 tablet by mouth Every Night., Disp: 90 tablet, Rfl: 3    Allergies   Allergen Reactions    Morphine Anaphylaxis     Patient states not allergic, had a reaction one time while in the hospital.  \"been 11 years ago\"       Family History   Problem Relation Age of Onset    Heart failure Mother     Heart failure Father     Heart disease Brother     Heart disease Brother     Heart attack Brother     No Known Problems Brother        Past Surgical History:   Procedure Laterality Date    CARDIAC CATHETERIZATION      CARDIAC CATHETERIZATION N/A 7/17/2020    Procedure: Left and Right Heart Cath with Coronary Angiography;  Surgeon: Talia Hickey MD;  Location: [facility] CATH INVASIVE LOCATION;  Service: Cardiovascular;  Laterality: N/A;    CORONARY ANGIOPLASTY WITH STENT PLACEMENT  06/21/2010    UMBILICAL HERNIA REPAIR         Past Medical History:   Diagnosis Date    Arthritis 6/17/2019    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    Biliary dyskinesia 6/17/2019    Coronary artery disease     Diverticulosis 8/7/2018    Gastric reflux 6/17/2019    Hemorrhoids 6/17/2019    Hyperlipidemia 6/17/2019    Hypertension 6/17/2019       Family History   Problem Relation Age of Onset    Heart failure Mother     Heart " failure Father     Heart disease Brother     Heart disease Brother     Heart attack Brother     No Known Problems Brother        Social History     Socioeconomic History    Marital status:     Number of children: 2   Tobacco Use    Smoking status: Former     Current packs/day: 0.00     Types: Cigarettes, Pipe     Quit date: 1978     Years since quittin.8     Passive exposure: Past    Smokeless tobacco: Never    Tobacco comments:     Smoked for 20yrs   Vaping Use    Vaping status: Never Used   Substance and Sexual Activity    Alcohol use: Never    Drug use: Never    Sexual activity: Yes     Partners: Female     Birth control/protection: Vasectomy           ECG 12 Lead    Date/Time: 10/24/2024 11:02 AM  Performed by: Talia Hickey MD    Authorized by: Talia Hickey MD  Comparison: compared with previous ECG   Similar to previous ECG  Comparison to previous ECG: Sinus pericardia 56/min normal axis normal intervals no ectopy no significant change from previous EKG.        Objective:       Physical Exam    There were no vitals taken for this visit.  The patient is alert, oriented and in no distress.    Vital signs as noted above.  Exogenous obesity (BMI 32)    Head and neck revealed no carotid bruits or jugular venous distension.  No thyromegaly or lymphadenopathy is present.    Lungs clear.  No wheezing.  Breath sounds are normal bilaterally.    Heart normal first and second heart sounds.  No murmur..  No pericardial rub is present.  No gallop is present.    Abdomen soft and nontender.  No organomegaly is present.    Extremities revealed good peripheral pulses without any pedal edema.    Skin warm and dry.    Musculoskeletal system is grossly normal.    CNS grossly normal.    Reviewed and updated.

## 2024-10-24 ENCOUNTER — OFFICE VISIT (OUTPATIENT)
Dept: CARDIOLOGY | Facility: CLINIC | Age: 74
End: 2024-10-24
Payer: MEDICARE

## 2024-10-24 VITALS
SYSTOLIC BLOOD PRESSURE: 145 MMHG | HEART RATE: 60 BPM | WEIGHT: 211 LBS | DIASTOLIC BLOOD PRESSURE: 84 MMHG | BODY MASS INDEX: 31.98 KG/M2 | HEIGHT: 68 IN | OXYGEN SATURATION: 95 %

## 2024-10-24 DIAGNOSIS — I10 PRIMARY HYPERTENSION: ICD-10-CM

## 2024-10-24 DIAGNOSIS — I10 ESSENTIAL HYPERTENSION: ICD-10-CM

## 2024-10-24 DIAGNOSIS — E78.2 MIXED HYPERLIPIDEMIA: ICD-10-CM

## 2024-10-24 DIAGNOSIS — Z95.820 STATUS POST ANGIOPLASTY WITH STENT: ICD-10-CM

## 2024-10-24 DIAGNOSIS — E78.5 DYSLIPIDEMIA: ICD-10-CM

## 2024-10-24 DIAGNOSIS — Z95.5 S/P PRIMARY ANGIOPLASTY WITH CORONARY STENT: Primary | ICD-10-CM

## 2024-10-24 DIAGNOSIS — R06.02 SHORTNESS OF BREATH: ICD-10-CM

## 2024-10-24 RX ORDER — CLOPIDOGREL BISULFATE 75 MG/1
75 TABLET ORAL DAILY
Qty: 90 TABLET | Refills: 3 | Status: SHIPPED | OUTPATIENT
Start: 2024-10-24

## 2024-10-24 RX ORDER — LISINOPRIL 5 MG/1
5 TABLET ORAL DAILY
Qty: 90 TABLET | Refills: 3 | Status: SHIPPED | OUTPATIENT
Start: 2024-10-24

## 2024-10-24 RX ORDER — SIMVASTATIN 20 MG
20 TABLET ORAL NIGHTLY
Qty: 90 TABLET | Refills: 3 | Status: SHIPPED | OUTPATIENT
Start: 2024-10-24

## 2025-01-21 ENCOUNTER — TELEPHONE (OUTPATIENT)
Dept: FAMILY MEDICINE CLINIC | Facility: CLINIC | Age: 75
End: 2025-01-21
Payer: MEDICARE

## 2025-01-21 RX ORDER — DOXYCYCLINE 100 MG/1
100 CAPSULE ORAL 2 TIMES DAILY
Qty: 20 CAPSULE | Refills: 0 | Status: SHIPPED | OUTPATIENT
Start: 2025-01-21 | End: 2025-01-31

## 2025-01-21 NOTE — TELEPHONE ENCOUNTER
Caller: Evaristo Barlow    Relationship: Self    Best call back number:     [phone] (Mobile)       What medication are you requesting: DOXYCYCLINE       What are your current symptoms: SINUS ISSUES AND BRONCHITIS SYMPTOMS   COUGH     How long have you been experiencing symptoms:     Have you had these symptoms before:    [x] Yes  [] No    Have you been treated for these symptoms before:   [x] Yes  [] No    If a prescription is needed, what is your preferred pharmacy and phone number: [id number] - [place], [place] - [address] - [phone] [facility] [phone]      Additional notes:   PLEASE ADVISE

## 2025-01-23 RX ORDER — CLOPIDOGREL BISULFATE 75 MG/1
75 TABLET ORAL DAILY
Qty: 90 TABLET | Refills: 3 | Status: SHIPPED | OUTPATIENT
Start: 2025-01-23

## 2025-01-23 RX ORDER — SIMVASTATIN 20 MG
20 TABLET ORAL NIGHTLY
Qty: 90 TABLET | Refills: 3 | Status: SHIPPED | OUTPATIENT
Start: 2025-01-23

## 2025-01-23 RX ORDER — LISINOPRIL 5 MG/1
5 TABLET ORAL DAILY
Qty: 90 TABLET | Refills: 3 | Status: SHIPPED | OUTPATIENT
Start: 2025-01-23

## 2025-01-23 NOTE — TELEPHONE ENCOUNTER
Rx Refill Note  Requested Prescriptions     Pending Prescriptions Disp Refills    clopidogrel (PLAVIX) 75 MG tablet 90 tablet 3     Sig: Take 1 tablet by mouth Daily.    lisinopril (PRINIVIL,ZESTRIL) 5 MG tablet 90 tablet 3     Sig: Take 1 tablet by mouth Daily.    simvastatin (ZOCOR) 20 MG tablet 90 tablet 3     Sig: Take 1 tablet by mouth Every Night.      Last office visit with prescribing clinician: 10/24/2024   Last telemedicine visit with prescribing clinician: Visit date not found   Next office visit with prescribing clinician: 4/25/2025                         Would you like a call back once the refill request has been completed: [] Yes [] No    If the office needs to give you a call back, can they leave a voicemail: [] Yes [] No    Francesca Teran MA  01/23/25, 12:14 EST

## 2025-02-25 ENCOUNTER — OFFICE VISIT (OUTPATIENT)
Dept: FAMILY MEDICINE CLINIC | Facility: CLINIC | Age: 75
End: 2025-02-25
Payer: MEDICARE

## 2025-02-25 ENCOUNTER — LAB (OUTPATIENT)
Dept: LAB | Facility: HOSPITAL | Age: 75
End: 2025-02-25
Payer: MEDICARE

## 2025-02-25 ENCOUNTER — LAB (OUTPATIENT)
Dept: FAMILY MEDICINE CLINIC | Facility: CLINIC | Age: 75
End: 2025-02-25
Payer: MEDICARE

## 2025-02-25 VITALS
OXYGEN SATURATION: 97 % | HEART RATE: 66 BPM | HEIGHT: 68 IN | WEIGHT: 209 LBS | BODY MASS INDEX: 31.67 KG/M2 | SYSTOLIC BLOOD PRESSURE: 131 MMHG | TEMPERATURE: 97.8 F | DIASTOLIC BLOOD PRESSURE: 81 MMHG

## 2025-02-25 DIAGNOSIS — I10 PRIMARY HYPERTENSION: Primary | ICD-10-CM

## 2025-02-25 DIAGNOSIS — E78.2 MIXED HYPERLIPIDEMIA: ICD-10-CM

## 2025-02-25 DIAGNOSIS — Z29.9 PREVENTIVE MEASURE: ICD-10-CM

## 2025-02-25 LAB
ABO GROUP BLD: NORMAL
ALBUMIN SERPL-MCNC: 4.4 G/DL (ref 3.5–5.2)
ALBUMIN/GLOB SERPL: 2 G/DL
ALP SERPL-CCNC: 75 U/L (ref 39–117)
ALT SERPL W P-5'-P-CCNC: 21 U/L (ref 1–41)
ANION GAP SERPL CALCULATED.3IONS-SCNC: 7.7 MMOL/L (ref 5–15)
AST SERPL-CCNC: 25 U/L (ref 1–40)
BASOPHILS # BLD AUTO: 0.04 10*3/MM3 (ref 0–0.2)
BASOPHILS NFR BLD AUTO: 0.6 % (ref 0–1.5)
BILIRUB SERPL-MCNC: 1.9 MG/DL (ref 0–1.2)
BUN SERPL-MCNC: 16 MG/DL (ref 8–23)
BUN/CREAT SERPL: 19 (ref 7–25)
CALCIUM SPEC-SCNC: 9.4 MG/DL (ref 8.6–10.5)
CHLORIDE SERPL-SCNC: 103 MMOL/L (ref 98–107)
CHOLEST SERPL-MCNC: 132 MG/DL (ref 0–200)
CO2 SERPL-SCNC: 28.3 MMOL/L (ref 22–29)
CREAT SERPL-MCNC: 0.84 MG/DL (ref 0.76–1.27)
DEPRECATED RDW RBC AUTO: 39.1 FL (ref 37–54)
EGFRCR SERPLBLD CKD-EPI 2021: 90.4 ML/MIN/1.73
EOSINOPHIL # BLD AUTO: 0.08 10*3/MM3 (ref 0–0.4)
EOSINOPHIL NFR BLD AUTO: 1.2 % (ref 0.3–6.2)
ERYTHROCYTE [DISTWIDTH] IN BLOOD BY AUTOMATED COUNT: 12.9 % (ref 12.3–15.4)
GLOBULIN UR ELPH-MCNC: 2.2 GM/DL
GLUCOSE SERPL-MCNC: 96 MG/DL (ref 65–99)
HCT VFR BLD AUTO: 49.7 % (ref 37.5–51)
HDLC SERPL-MCNC: 28 MG/DL (ref 40–60)
HGB BLD-MCNC: 16.6 G/DL (ref 13–17.7)
IMM GRANULOCYTES # BLD AUTO: 0.03 10*3/MM3 (ref 0–0.05)
IMM GRANULOCYTES NFR BLD AUTO: 0.4 % (ref 0–0.5)
LDLC SERPL CALC-MCNC: 79 MG/DL (ref 0–100)
LDLC/HDLC SERPL: 2.7 {RATIO}
LYMPHOCYTES # BLD AUTO: 1.34 10*3/MM3 (ref 0.7–3.1)
LYMPHOCYTES NFR BLD AUTO: 20 % (ref 19.6–45.3)
MCH RBC QN AUTO: 28 PG (ref 26.6–33)
MCHC RBC AUTO-ENTMCNC: 33.4 G/DL (ref 31.5–35.7)
MCV RBC AUTO: 84 FL (ref 79–97)
MONOCYTES # BLD AUTO: 0.52 10*3/MM3 (ref 0.1–0.9)
MONOCYTES NFR BLD AUTO: 7.7 % (ref 5–12)
NEUTROPHILS NFR BLD AUTO: 4.7 10*3/MM3 (ref 1.7–7)
NEUTROPHILS NFR BLD AUTO: 70.1 % (ref 42.7–76)
NRBC BLD AUTO-RTO: 0 /100 WBC (ref 0–0.2)
PLATELET # BLD AUTO: 142 10*3/MM3 (ref 140–450)
PMV BLD AUTO: 11.6 FL (ref 6–12)
POTASSIUM SERPL-SCNC: 4.3 MMOL/L (ref 3.5–5.2)
PROT SERPL-MCNC: 6.6 G/DL (ref 6–8.5)
RBC # BLD AUTO: 5.92 10*6/MM3 (ref 4.14–5.8)
RH BLD: POSITIVE
SODIUM SERPL-SCNC: 139 MMOL/L (ref 136–145)
TRIGL SERPL-MCNC: 142 MG/DL (ref 0–150)
VLDLC SERPL-MCNC: 25 MG/DL (ref 5–40)
WBC NRBC COR # BLD AUTO: 6.71 10*3/MM3 (ref 3.4–10.8)

## 2025-02-25 PROCEDURE — 86900 BLOOD TYPING SEROLOGIC ABO: CPT

## 2025-02-25 PROCEDURE — 36415 COLL VENOUS BLD VENIPUNCTURE: CPT | Performed by: FAMILY MEDICINE

## 2025-02-25 PROCEDURE — 85025 COMPLETE CBC W/AUTO DIFF WBC: CPT | Performed by: FAMILY MEDICINE

## 2025-02-25 PROCEDURE — 86900 BLOOD TYPING SEROLOGIC ABO: CPT | Performed by: FAMILY MEDICINE

## 2025-02-25 PROCEDURE — 86901 BLOOD TYPING SEROLOGIC RH(D): CPT | Performed by: FAMILY MEDICINE

## 2025-02-25 PROCEDURE — 99214 OFFICE O/P EST MOD 30 MIN: CPT | Performed by: FAMILY MEDICINE

## 2025-02-25 PROCEDURE — 3079F DIAST BP 80-89 MM HG: CPT | Performed by: FAMILY MEDICINE

## 2025-02-25 PROCEDURE — 80053 COMPREHEN METABOLIC PANEL: CPT | Performed by: FAMILY MEDICINE

## 2025-02-25 PROCEDURE — 3075F SYST BP GE 130 - 139MM HG: CPT | Performed by: FAMILY MEDICINE

## 2025-02-25 PROCEDURE — 80061 LIPID PANEL: CPT | Performed by: FAMILY MEDICINE

## 2025-02-25 PROCEDURE — 1126F AMNT PAIN NOTED NONE PRSNT: CPT | Performed by: FAMILY MEDICINE

## 2025-02-25 RX ORDER — CHLORCYCLIZINE HYDROCHLORIDE AND PSEUDOEPHEDRINE HYDROCHLORIDE 25; 60 MG/1; MG/1
0.5 TABLET ORAL 3 TIMES DAILY PRN
Qty: 42 TABLET | Refills: 3 | Status: SHIPPED | OUTPATIENT
Start: 2025-02-25

## 2025-02-25 RX ORDER — MULTIPLE VITAMINS W/ MINERALS TAB 9MG-400MCG
1 TAB ORAL DAILY
COMMUNITY

## 2025-02-25 RX ORDER — NITROGLYCERIN 0.4 MG/1
0.4 TABLET SUBLINGUAL
Qty: 30 TABLET | Refills: 1 | Status: SHIPPED | OUTPATIENT
Start: 2025-02-25

## 2025-02-25 NOTE — PROGRESS NOTES
"Subjective   Evaristo Barlow is a 74 y.o. male.     History of Present Illness  Evaristo Barlow is in for follow up on his high blood pressure and high cholesterol.  He has been finding some lingering head congestion despite having some treatment recently.  He did have an isolated episode of chest pain and did not have any nitro on hand to treat it, so he just laid down and the episode went away.  He has been prone to some dyspnea if he exerts himself but he is capable of doing all of his daily activities without having to do more than just a minor pause.  There is no history of issue with bowel or bladder dysfunction. There is no history of dizziness or lightheadedness. There is no history of issue with sleep or mood. There is no history of issue with present medication.       Hypertension  Associated symptoms include chest pain and shortness of breath. Pertinent negatives include no headaches or neck pain.   URI   Associated symptoms include chest pain and congestion. Pertinent negatives include no abdominal pain, diarrhea, dysuria, headaches, nausea, neck pain, sinus pain, sore throat, vomiting or wheezing.          /81 (BP Location: Left arm, Patient Position: Sitting, Cuff Size: Large Adult)   Pulse 66   Temp 97.8 °F (36.6 °C) (Temporal)   Ht 172.7 cm (67.99\")   Wt 94.8 kg (209 lb)   SpO2 97%   BMI 31.79 kg/m²       Chief Complaint   Patient presents with    Hypertension     6 month f/u     URI     Finished the doxycyline but wants another round of it and the [employer] - [employer] if possible            Current Outpatient Medications:     clopidogrel (PLAVIX) 75 MG tablet, Take 1 tablet by mouth Daily., Disp: 90 tablet, Rfl: 3    esomeprazole (nexIUM) 20 MG capsule, Take 1 capsule by mouth Every Morning Before Breakfast., Disp: , Rfl:     lisinopril (PRINIVIL,ZESTRIL) 5 MG tablet, Take 1 tablet by mouth Daily., Disp: 90 tablet, Rfl: 3    multivitamin with minerals tablet tablet, Take 1 tablet by " mouth Daily. Super Beta Prostate Advanced, Disp: , Rfl:     nitroglycerin (NITROSTAT) 0.4 MG SL tablet, Place 1 tablet under the tongue Every 5 (Five) Minutes As Needed for Chest Pain. Take no more than 3 doses in 15 minutes., Disp: 30 tablet, Rfl: 1    simvastatin (ZOCOR) 20 MG tablet, Take 1 tablet by mouth Every Night., Disp: 90 tablet, Rfl: 3    amoxicillin-clavulanate (AUGMENTIN) 875-125 MG per tablet, Take 1 tablet by mouth 2 (Two) Times a Day for 7 days., Disp: 14 tablet, Rfl: 0    Chlorcyclizine-Pseudoephed (Stahist AD) 25-60 MG tablet, Take 0.5 tablets by mouth 3 (Three) Times a Day As Needed (congestion, allergies)., Disp: 42 tablet, Rfl: 3            The following portions of the patient's history were reviewed and updated as appropriate: allergies, current medications, past family history, past medical history, past social history, past surgical history, and problem list.    Review of Systems   Constitutional:  Negative for activity change, fatigue and fever.   HENT:  Positive for congestion, postnasal drip and sinus pressure. Negative for sinus pain, sore throat and trouble swallowing.    Eyes:  Negative for visual disturbance.   Respiratory:  Positive for shortness of breath. Negative for chest tightness and wheezing.    Cardiovascular:  Positive for chest pain.   Gastrointestinal:  Negative for abdominal distention, abdominal pain, constipation, diarrhea, nausea and vomiting.   Genitourinary:  Negative for difficulty urinating and dysuria.   Musculoskeletal:  Negative for arthralgias, back pain, myalgias and neck pain.   Neurological:  Negative for dizziness and headaches.   Psychiatric/Behavioral:  Negative for agitation, hallucinations, sleep disturbance and suicidal ideas.        Objective   Physical Exam  Vitals and nursing note reviewed.   Constitutional:       Appearance: He is obese.   HENT:      Right Ear: Tympanic membrane and ear canal normal.      Left Ear: Tympanic membrane and ear canal  normal.   Cardiovascular:      Rate and Rhythm: Normal rate and regular rhythm.      Heart sounds: Normal heart sounds. No murmur heard.  Pulmonary:      Effort: Pulmonary effort is normal.      Breath sounds: No wheezing or rales.   Abdominal:      General: Bowel sounds are normal.      Palpations: Abdomen is soft.      Tenderness: There is no abdominal tenderness. There is no guarding or rebound.      Hernia: No hernia is present.   Musculoskeletal:      Cervical back: Neck supple. No rigidity.      Right lower leg: No edema.      Left lower leg: No edema.   Lymphadenopathy:      Cervical: No cervical adenopathy.   Neurological:      Mental Status: He is alert and oriented to person, place, and time. Mental status is at baseline.   Psychiatric:         Mood and Affect: Mood normal.           Assessment & Plan   Problems Addressed this Visit          Cardiac and Vasculature    Hyperlipidemia (Chronic)    High blood pressure - Primary    Relevant Orders    Comprehensive metabolic panel    Lipid panel    CBC w AUTO Differential     Other Visit Diagnoses       Preventive measure        Relevant Orders    ABO/Rh          Diagnoses         Codes Comments    Primary hypertension    -  Primary ICD-10-CM: I10  ICD-9-CM: 401.9     Mixed hyperlipidemia     ICD-10-CM: E78.2  ICD-9-CM: 272.2     Preventive measure     ICD-10-CM: Z29.9  ICD-9-CM: V07.9           I did advise him to let his cardiologist know about the chest pain episode  I did renew his nitro  I did send out a little bit of medication for some lingering head congestion  I did order labs including a blood type, because he wants to donate blood to help a woman at [employer]  I did ask him to go off of his Plavix for a few days prior to the blood transfusion just to make the process less complicated  I do plan to see him back later in the year for his Medicare wellness

## 2025-04-09 ENCOUNTER — TELEPHONE (OUTPATIENT)
Dept: FAMILY MEDICINE CLINIC | Facility: CLINIC | Age: 75
End: 2025-04-09

## 2025-04-09 DIAGNOSIS — R91.1 LUNG NODULE: Primary | ICD-10-CM

## 2025-04-09 NOTE — TELEPHONE ENCOUNTER
Pt received a letter from [employer] about getting a CT of his chest.  He just wanted to run that by Dr Hanna to be sure it is needed.  Please call pt back.  Thank you!

## 2025-05-14 ENCOUNTER — OFFICE VISIT (OUTPATIENT)
Dept: CARDIOLOGY | Facility: CLINIC | Age: 75
End: 2025-05-14
Payer: MEDICARE

## 2025-05-14 VITALS
SYSTOLIC BLOOD PRESSURE: 131 MMHG | BODY MASS INDEX: 32.13 KG/M2 | OXYGEN SATURATION: 96 % | WEIGHT: 212 LBS | HEART RATE: 70 BPM | DIASTOLIC BLOOD PRESSURE: 78 MMHG | HEIGHT: 68 IN

## 2025-05-14 DIAGNOSIS — E78.5 DYSLIPIDEMIA: ICD-10-CM

## 2025-05-14 DIAGNOSIS — Z95.5 S/P PRIMARY ANGIOPLASTY WITH CORONARY STENT: Primary | ICD-10-CM

## 2025-05-14 DIAGNOSIS — I10 PRIMARY HYPERTENSION: ICD-10-CM

## 2025-05-14 DIAGNOSIS — I10 ESSENTIAL HYPERTENSION: ICD-10-CM

## 2025-05-14 DIAGNOSIS — E78.2 MIXED HYPERLIPIDEMIA: ICD-10-CM

## 2025-05-14 DIAGNOSIS — R06.02 SHORTNESS OF BREATH: ICD-10-CM

## 2025-05-14 DIAGNOSIS — Z95.820 STATUS POST ANGIOPLASTY WITH STENT: ICD-10-CM

## 2025-05-14 NOTE — PROGRESS NOTES
Encounter Date:05/14/2025  Last seen 10/24/2024.      Patient ID: Evaristo Barlow is a 74 y.o. male.        Chief Complaint:     Status post stent  Hypertension  Dyslipidemia    History of Present Illness  Patient has tiredness.  No cardiac symptoms.  Since I have last seen, the patient has been without any chest discomfort ,shortness of breath, palpitations, dizziness or syncope.  Denies having any headache ,abdominal pain ,nausea, vomiting , diarrhea constipation, loss of weight or loss of appetite.  Denies having any excessive bruising ,hematuria or blood in the stool.    Review of all systems negative except as indicated.    Reviewed ROS..  Assessment and Plan         [[[[[[[[[[[[[[[[[[[[[  History  ==============   -status post stent to diagonal branch 06/21/2010 .     -Shortness of breath and tiredness-better     Echocardiogram 7/14/2020  Mild aortic valve stenosis.  Gradient of 14 mmHg mean gradient 7 mmHg  Left ventricular size and contractility is normal with ejection fraction of 60%     Stress Cardiolite test-7/14/2020-abnormal with proximal inferior ischemia     Cardiac catheterization 7/17/2020   No evidence for pulmonary hypertension is present.  Normal left ventricular size and contractility with ejection fraction of 60%.  Left main coronary artery is normal.  Left anterior descending artery normal.  Diagonal branch stent is patent with 50% disease in the midsegment of the stent.  Circumflex coronary artery normal.  Right coronary artery is a large and dominant vessel and is normal.     @@Please note RCA could be selectively engaged using JR4 catheter.  In the past 3D RC catheter was used.@@        Cardiac catheterization 06/21/2017 revealed normal left ventricular function diagonal branch stent was patent.  No significant disease was present. Please note RCA was engaged using 3D RC catheter.     - chest discomfort Extreme weakness and tiredness -Improved since lisinopril  was discontinued.       -hypertension and dyslipidemia     -recently diagnosed Solis's esophagitis      -strong family history of coronary artery disease      -former smoker      -allergy to penicillin.  ================    Plan  ============  Status post stent  Patient is not having any angina pectoris or congestive heart failure.  .  EKG 5/14/2025-normal sinus rhythm normal ECG  EKG showed sinus bradycardia without any ischemic changes-10/16/2023  EKG 4/23/2024 sinus rhythm premature ventricular contraction  EKG 10/24/2024-sinus rhythm without ischemic changes      Hypertension-well-controlled  130/80.    Continue lisinopril 5 mg a day     Dyslipidemia-continue simvastatin    Patient has tiredness.  Does not have any cardiac symptoms.    Medications were reviewed and updated.    Follow-up with office in 6 months.    Further plan will depend on patient's progress.    Reviewed and updated 5/14/2025.    [[[[[[[[[[[[[[[[[[[[[[[[[[         Diagnosis Plan   1. S/P primary angioplasty with coronary stent        2. Status post angioplasty with stent        3. Essential hypertension        4. Shortness of breath        5. Dyslipidemia        6. Mixed hyperlipidemia        7. Primary hypertension        LAB RESULTS (LAST 7 DAYS)    CBC        BMP        CMP         BNP        TROPONIN        CoAg        Creatinine Clearance  CrCl cannot be calculated (Patient's most recent lab result is older than the maximum 30 days allowed.).    ABG        Radiology  No radiology results for the last day                The following portions of the patient's history were reviewed and updated as appropriate: allergies, current medications, past family history, past medical history, past social history, past surgical history, and problem list.    Review of Systems   Constitutional: Positive for malaise/fatigue.   Cardiovascular:  Negative for chest pain, leg swelling, palpitations and syncope.   Respiratory:  Negative for shortness of breath.    Skin:  Negative  "for rash.   Gastrointestinal:  Negative for nausea and vomiting.   Neurological:  Negative for dizziness, light-headedness and numbness.   All other systems reviewed and are negative.        Current Outpatient Medications:     Chlorcyclizine-Pseudoephed (Stahist AD) 25-60 MG tablet, Take 0.5 tablets by mouth 3 (Three) Times a Day As Needed (congestion, allergies)., Disp: 42 tablet, Rfl: 3    clopidogrel (PLAVIX) 75 MG tablet, Take 1 tablet by mouth Daily., Disp: 90 tablet, Rfl: 3    esomeprazole (nexIUM) 20 MG capsule, Take 1 capsule by mouth Every Morning Before Breakfast., Disp: , Rfl:     lisinopril (PRINIVIL,ZESTRIL) 5 MG tablet, Take 1 tablet by mouth Daily., Disp: 90 tablet, Rfl: 3    multivitamin with minerals tablet tablet, Take 1 tablet by mouth Daily. Super Beta Prostate Advanced, Disp: , Rfl:     nitroglycerin (NITROSTAT) 0.4 MG SL tablet, Place 1 tablet under the tongue Every 5 (Five) Minutes As Needed for Chest Pain. Take no more than 3 doses in 15 minutes., Disp: 30 tablet, Rfl: 1    simvastatin (ZOCOR) 20 MG tablet, Take 1 tablet by mouth Every Night., Disp: 90 tablet, Rfl: 3    Allergies   Allergen Reactions    Morphine Anaphylaxis     Patient states not allergic, had a reaction one time while in the hospital.  \"been 11 years ago\"       Family History   Problem Relation Age of Onset    Heart failure Mother     Heart failure Father     Heart disease Brother     Heart disease Brother     Heart attack Brother     No Known Problems Brother        Past Surgical History:   Procedure Laterality Date    CARDIAC CATHETERIZATION      CARDIAC CATHETERIZATION N/A 7/17/2020    Procedure: Left and Right Heart Cath with Coronary Angiography;  Surgeon: Talia Hickey MD;  Location: [facility] CATH INVASIVE LOCATION;  Service: Cardiovascular;  Laterality: N/A;    CORONARY ANGIOPLASTY WITH STENT PLACEMENT  06/21/2010    UMBILICAL HERNIA REPAIR         Past Medical History:   Diagnosis Date    Arthritis 6/17/2019    " "Solis esophagus 2018    Biliary dyskinesia 2019    Coronary artery disease     Diverticulosis 2018    Gastric reflux 2019    Hemorrhoids 2019    Hyperlipidemia 2019    Hypertension 2019       Family History   Problem Relation Age of Onset    Heart failure Mother     Heart failure Father     Heart disease Brother     Heart disease Brother     Heart attack Brother     No Known Problems Brother        Social History     Socioeconomic History    Marital status:     Number of children: 2   Tobacco Use    Smoking status: Former     Current packs/day: 0.00     Average packs/day: 1 pack/day for 16.0 years (16.0 ttl pk-yrs)     Types: Cigarettes, Pipe     Start date:      Quit date: 1978     Years since quittin.3     Passive exposure: Past    Smokeless tobacco: Never    Tobacco comments:     Smoked for 20yrs   Vaping Use    Vaping status: Never Used   Substance and Sexual Activity    Alcohol use: Never    Drug use: Never    Sexual activity: Yes     Partners: Female     Birth control/protection: Vasectomy           ECG 12 Lead    Date/Time: 2025 3:37 PM  Performed by: Talia Hickey MD    Authorized by: Talia Hickey MD  Comparison: compared with previous ECG   Similar to previous ECG  Comparison to previous ECG: Normal sinus rhythm nonspecific ST-T wave changes 70/min normal axis normal intervals no ectopy no significant change from previous EKG.        Objective:       Physical Exam    /78 (BP Location: Left arm, Patient Position: Sitting, Cuff Size: Adult)   Pulse 70   Ht 172.7 cm (68\")   Wt 96.2 kg (212 lb)   SpO2 96%   BMI 32.23 kg/m²   The patient is alert, oriented and in no distress.    Vital signs as noted above.    Head and neck revealed no carotid bruits or jugular venous distension.  No thyromegaly or lymphadenopathy is present.    Lungs clear.  No wheezing.  Breath sounds are normal bilaterally.    Heart normal first and second heart " sounds.  No murmur..  No pericardial rub is present.  No gallop is present.    Abdomen soft and nontender.  No organomegaly is present.    Extremities revealed good peripheral pulses without any pedal edema.    Skin warm and dry.    Musculoskeletal system is grossly normal.    CNS grossly normal.    Reviewed and updated.

## 2025-05-26 ENCOUNTER — HOSPITAL ENCOUNTER (EMERGENCY)
Facility: HOSPITAL | Age: 75
Discharge: HOME | End: 2025-05-26
Admitting: EMERGENCY MEDICINE
Payer: MEDICARE

## 2025-05-26 VITALS
DIASTOLIC BLOOD PRESSURE: 82 MMHG | OXYGEN SATURATION: 97 % | RESPIRATION RATE: 16 BRPM | TEMPERATURE: 98.1 F | HEART RATE: 82 BPM | SYSTOLIC BLOOD PRESSURE: 146 MMHG | WEIGHT: 211.2 LBS | HEIGHT: 68 IN | BODY MASS INDEX: 32.01 KG/M2

## 2025-05-26 DIAGNOSIS — R33.9 URINARY RETENTION: Primary | ICD-10-CM

## 2025-05-26 DIAGNOSIS — K59.00 CONSTIPATION, UNSPECIFIED CONSTIPATION TYPE: ICD-10-CM

## 2025-05-26 LAB
ANION GAP SERPL CALCULATED.3IONS-SCNC: 13.6 MMOL/L (ref 5–15)
BACTERIA UR QL AUTO: NORMAL /HPF
BASOPHILS # BLD AUTO: 0.02 10*3/MM3 (ref 0–0.2)
BASOPHILS NFR BLD AUTO: 0.2 % (ref 0–1.5)
BILIRUB UR QL STRIP: NEGATIVE
BUN SERPL-MCNC: 15 MG/DL (ref 8–23)
BUN/CREAT SERPL: 18.8 (ref 7–25)
CALCIUM SPEC-SCNC: 9.4 MG/DL (ref 8.6–10.5)
CHLORIDE SERPL-SCNC: 100 MMOL/L (ref 98–107)
CLARITY UR: CLEAR
CO2 SERPL-SCNC: 22.4 MMOL/L (ref 22–29)
COLOR UR: YELLOW
CREAT SERPL-MCNC: 0.8 MG/DL (ref 0.76–1.27)
DEPRECATED RDW RBC AUTO: 39 FL (ref 37–54)
EGFRCR SERPLBLD CKD-EPI 2021: 91.7 ML/MIN/1.73
EOSINOPHIL # BLD AUTO: 0.04 10*3/MM3 (ref 0–0.4)
EOSINOPHIL NFR BLD AUTO: 0.5 % (ref 0.3–6.2)
ERYTHROCYTE [DISTWIDTH] IN BLOOD BY AUTOMATED COUNT: 12.6 % (ref 12.3–15.4)
GLUCOSE SERPL-MCNC: 120 MG/DL (ref 65–99)
GLUCOSE UR STRIP-MCNC: NEGATIVE MG/DL
HCT VFR BLD AUTO: 49.7 % (ref 37.5–51)
HGB BLD-MCNC: 16.6 G/DL (ref 13–17.7)
HGB UR QL STRIP.AUTO: NEGATIVE
HYALINE CASTS UR QL AUTO: NORMAL /LPF
IMM GRANULOCYTES # BLD AUTO: 0.03 10*3/MM3 (ref 0–0.05)
IMM GRANULOCYTES NFR BLD AUTO: 0.4 % (ref 0–0.5)
KETONES UR QL STRIP: NEGATIVE
LEUKOCYTE ESTERASE UR QL STRIP.AUTO: ABNORMAL
LYMPHOCYTES # BLD AUTO: 0.83 10*3/MM3 (ref 0.7–3.1)
LYMPHOCYTES NFR BLD AUTO: 9.7 % (ref 19.6–45.3)
MCH RBC QN AUTO: 28.2 PG (ref 26.6–33)
MCHC RBC AUTO-ENTMCNC: 33.4 G/DL (ref 31.5–35.7)
MCV RBC AUTO: 84.4 FL (ref 79–97)
MONOCYTES # BLD AUTO: 0.54 10*3/MM3 (ref 0.1–0.9)
MONOCYTES NFR BLD AUTO: 6.3 % (ref 5–12)
NEUTROPHILS NFR BLD AUTO: 7.1 10*3/MM3 (ref 1.7–7)
NEUTROPHILS NFR BLD AUTO: 82.9 % (ref 42.7–76)
NITRITE UR QL STRIP: NEGATIVE
NRBC BLD AUTO-RTO: 0 /100 WBC (ref 0–0.2)
PH UR STRIP.AUTO: 7 [PH] (ref 5–8)
PLATELET # BLD AUTO: 117 10*3/MM3 (ref 140–450)
PMV BLD AUTO: 11.7 FL (ref 6–12)
POTASSIUM SERPL-SCNC: 4.2 MMOL/L (ref 3.5–5.2)
PROT UR QL STRIP: NEGATIVE
RBC # BLD AUTO: 5.89 10*6/MM3 (ref 4.14–5.8)
RBC # UR STRIP: NORMAL /HPF
RBC MORPH BLD: NORMAL
REF LAB TEST METHOD: NORMAL
SMALL PLATELETS BLD QL SMEAR: NORMAL
SODIUM SERPL-SCNC: 136 MMOL/L (ref 136–145)
SP GR UR STRIP: 1.02 (ref 1–1.03)
SQUAMOUS #/AREA URNS HPF: NORMAL /HPF
UROBILINOGEN UR QL STRIP: ABNORMAL
WBC # UR STRIP: NORMAL /HPF
WBC MORPH BLD: NORMAL
WBC NRBC COR # BLD AUTO: 8.56 10*3/MM3 (ref 3.4–10.8)

## 2025-05-26 PROCEDURE — 80048 BASIC METABOLIC PNL TOTAL CA: CPT | Performed by: NURSE PRACTITIONER

## 2025-05-26 PROCEDURE — 81001 URINALYSIS AUTO W/SCOPE: CPT | Performed by: NURSE PRACTITIONER

## 2025-05-26 PROCEDURE — 99283 EMERGENCY DEPT VISIT LOW MDM: CPT

## 2025-05-26 PROCEDURE — 85007 BL SMEAR W/DIFF WBC COUNT: CPT | Performed by: NURSE PRACTITIONER

## 2025-05-26 PROCEDURE — 85025 COMPLETE CBC W/AUTO DIFF WBC: CPT | Performed by: NURSE PRACTITIONER

## 2025-05-26 RX ORDER — SODIUM CHLORIDE 0.9 % (FLUSH) 0.9 %
10 SYRINGE (ML) INJECTION AS NEEDED
Status: DISCONTINUED | OUTPATIENT
Start: 2025-05-26 | End: 2025-05-26 | Stop reason: HOSPADM

## 2025-05-26 RX ORDER — SORBITOL SOLUTION 70 %
45 SOLUTION, ORAL MISCELLANEOUS ONCE
Status: COMPLETED | OUTPATIENT
Start: 2025-05-26 | End: 2025-05-26

## 2025-05-26 RX ADMIN — SORBITOL SOLUTION (BULK) 45 ML: 70 SOLUTION at 20:58

## 2025-05-26 NOTE — ED NOTES
Report given to Kacy De, care transferred. Pt. Given large amount of PO fluids to drink and tolerating well. Attempt to fill bladder and see if he can void post in and out cath. Family at bedside.

## 2025-05-26 NOTE — ED PROVIDER NOTES
"Subjective   History of Present Illness  Patient is a 74-year-old white male who presents today with complaints of difficulty urinating.  States last night he was up in the middle the night trying to urinate.  He states he thinks he did urinate some however has not urinated since early this morning.  He reports an urgency.  He states he has been constipated.  He denies any abdominal pain back pain or flank pain.  No fever chills nausea or vomiting.      Review of Systems   Constitutional:  Negative for fever.   Gastrointestinal:  Positive for constipation. Negative for abdominal pain.   Genitourinary:  Positive for difficulty urinating. Negative for flank pain.       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  \"been 11 years ago\"       Past Surgical History:   Procedure Laterality Date    CARDIAC CATHETERIZATION      CARDIAC CATHETERIZATION N/A 7/17/2020    Procedure: Left and Right Heart Cath with Coronary Angiography;  Surgeon: Talia Hickey MD;  Location: [facility] CATH INVASIVE LOCATION;  Service: Cardiovascular;  Laterality: N/A;    CORONARY ANGIOPLASTY WITH STENT PLACEMENT  06/21/2010    UMBILICAL HERNIA REPAIR         Family History   Problem Relation Age of Onset    Heart failure Mother     Heart failure Father     Heart disease Brother     Heart disease Brother     Heart attack Brother     No Known Problems Brother        Social History     Socioeconomic History    Marital status:     Number of children: 2   Tobacco Use    Smoking status: Former     Current packs/day: 0.00     Average packs/day: 1 pack/day for 16.0 years " (16.0 ttl pk-yrs)     Types: Cigarettes, Pipe     Start date:      Quit date: 1978     Years since quittin.4     Passive exposure: Past    Smokeless tobacco: Never    Tobacco comments:     Smoked for 20yrs   Vaping Use    Vaping status: Never Used   Substance and Sexual Activity    Alcohol use: Never    Drug use: Never    Sexual activity: Yes     Partners: Female     Birth control/protection: Vasectomy           Objective   Physical Exam  Vital signs and triage nurse note reviewed.  Constitutional: Awake, alert; well-developed and well-nourished. No acute distress is noted.  Neck: Supple  Cardiovascular: Regular rate and rhythm, normal S1-S2.    Pulmonary: Respiratory effort regular nonlabored, breath sounds clear to auscultation all fields.  Abdomen: Soft, nontender, nondistended with normoactive bowel sounds; no rebound or guarding.  No CVAT.  Musculoskeletal: Independent range of motion of all extremities with no palpable tenderness or edema.  Skin: Flesh tone, warm, dry, intact; no erythematous or petechial rash or lesion.    Procedures           ED Course      Labs Reviewed   BASIC METABOLIC PANEL - Abnormal; Notable for the following components:       Result Value    Glucose 120 (*)     All other components within normal limits    Narrative:     GFR Categories in Chronic Kidney Disease (CKD)              GFR Category          GFR (mL/min/1.73)    Interpretation  G1                    90 or greater        Normal or high (1)  G2                    60-89                Mild decrease (1)  G3a                   45-59                Mild to moderate decrease  G3b                   30-44                Moderate to severe decrease  G4                    15-29                Severe decrease  G5                    14 or less           Kidney failure    (1)In the absence of evidence of kidney disease, neither GFR category G1 or G2 fulfill the criteria for CKD.    eGFR calculation  CKD-EPI creatinine  equation, which does not include race as a factor   URINALYSIS W/ CULTURE IF INDICATED - Abnormal; Notable for the following components:    Leuk Esterase, UA Trace (*)     All other components within normal limits    Narrative:     In absence of clinical symptoms, the presence of pyuria, bacteria, and/or nitrites on the urinalysis result does not correlate with infection.   CBC WITH AUTO DIFFERENTIAL - Abnormal; Notable for the following components:    RBC 5.89 (*)     Platelets 117 (*)     Neutrophil % 82.9 (*)     Lymphocyte % 9.7 (*)     Neutrophils, Absolute 7.10 (*)     All other components within normal limits   SCAN SLIDE   URINALYSIS, MICROSCOPIC ONLY   CBC AND DIFFERENTIAL    Narrative:     The following orders were created for panel order CBC & Differential.  Procedure                               Abnormality         Status                     ---------                               -----------         ------                     CBC Auto Differential[[id number]]        Abnormal            Final result               Scan Slide[[id number]]                                       Final result                 Please view results for these tests on the individual orders.     No radiology results for the last day  Medications   sodium chloride 0.9 % flush 10 mL (has no administration in time range)   sorbitol solution 45 mL (45 mL Oral Given 5/26/25 2058)                                                      Medical Decision Making  Patient presents today with the above complaint.    He had the above exam and evaluation.  IV was established.  Labs were obtained.  Bladder scan revealed nearly 400 cc of urine.  In-N-Out cath was performed with immediate return of approximately 400 mL of clear yellow urine.  Workup: CBC was significant for platelets of 117 which appears near previous.  Metabolic panel is unremarkable.  Urinalysis showed trace leukocytes but a normal microscopic.    On reexamination patient is resting  quietly no distress.  No new complaints.  He has been able to urinate several times throughout his ED stay without difficulty.  He reports continued complaints of feeling the need to have a bowel movement has not been able to produce much.  Was given a dose of sorbitol.  No abdominal pain back pain or flank pain.    Findings were discussed with him.  He will be discharged home and instructed follow-up with his primary care provider and urology.  He was given return precautions and he voiced understanding.    Amount and/or Complexity of Data Reviewed  Labs: ordered.    Risk  Prescription drug management.        Final diagnoses:   Urinary retention   Constipation, unspecified constipation type       ED Disposition  ED Disposition       ED Disposition   Discharge    Condition   Stable    Comment   --               Denzel Rashid MD  [address]  [facility] [address] [phone]          [facility] UROLOGY - [facility]  [address] [place] [id number] [phone]             Medication List      No changes were made to your prescriptions during this visit.            Sarah Molina, MICHELLE  05/26/25 7316

## 2025-05-27 NOTE — ED NOTES
Pt ambulated to bathroom to have a BM and stated he was able to urinate again. Pt states it was still slightly bloody, but was able to go more.

## 2025-05-27 NOTE — DISCHARGE INSTRUCTIONS
Take sorbitol as prescribed.  Drink plenty of fluids.  Increase fiber in your diet.  Follow-up with your primary care provider and urology.  Return for new or worsening symptoms.

## 2025-05-29 ENCOUNTER — PATIENT OUTREACH (OUTPATIENT)
Dept: CASE MANAGEMENT | Facility: CLINIC | Age: 75
End: 2025-05-29
Payer: MEDICARE

## 2025-05-29 DIAGNOSIS — I25.10 CORONARY ARTERY DISEASE INVOLVING NATIVE CORONARY ARTERY OF NATIVE HEART WITHOUT ANGINA PECTORIS: Primary | Chronic | ICD-10-CM

## 2025-05-29 NOTE — OUTREACH NOTE
AMBULATORY CASE MANAGEMENT NOTE    Names and Relationships of Patient/Support Persons: Contact: Evaristo Barlow; Relationship: Self -     ACM called and spoke with patient. Identified self and roll. Explained and offered CCM program and patient declined at this time. Encouraged patient to reach out in future if needs arise.       Sophy STORM  Ambulatory Case Management    5/29/2025, 09:11 EDT

## 2025-07-21 ENCOUNTER — LAB (OUTPATIENT)
Dept: LAB | Facility: HOSPITAL | Age: 75
End: 2025-07-21
Payer: MEDICARE

## 2025-07-21 ENCOUNTER — TRANSCRIBE ORDERS (OUTPATIENT)
Dept: ADMINISTRATIVE | Facility: HOSPITAL | Age: 75
End: 2025-07-21
Payer: MEDICARE

## 2025-07-21 DIAGNOSIS — R91.8 LUNG MASS: Primary | ICD-10-CM

## 2025-07-21 LAB
ALBUMIN SERPL-MCNC: 4.5 G/DL (ref 3.5–5.2)
ALBUMIN/GLOB SERPL: 2.5 G/DL
ALP SERPL-CCNC: 78 U/L (ref 39–117)
ALT SERPL W P-5'-P-CCNC: 25 U/L (ref 1–41)
ANION GAP SERPL CALCULATED.3IONS-SCNC: 14.3 MMOL/L (ref 5–15)
APTT PPP: 30.4 SECONDS (ref 22.7–35.4)
AST SERPL-CCNC: 30 U/L (ref 1–40)
BILIRUB SERPL-MCNC: 1.4 MG/DL (ref 0–1.2)
BUN SERPL-MCNC: 14.6 MG/DL (ref 8–23)
BUN/CREAT SERPL: 16.4 (ref 7–25)
CALCIUM SPEC-SCNC: 9.9 MG/DL (ref 8.6–10.5)
CHLORIDE SERPL-SCNC: 102 MMOL/L (ref 98–107)
CO2 SERPL-SCNC: 26.7 MMOL/L (ref 22–29)
CREAT SERPL-MCNC: 0.89 MG/DL (ref 0.76–1.27)
DEPRECATED RDW RBC AUTO: 38.7 FL (ref 37–54)
EGFRCR SERPLBLD CKD-EPI 2021: 88.8 ML/MIN/1.73
ERYTHROCYTE [DISTWIDTH] IN BLOOD BY AUTOMATED COUNT: 12.5 % (ref 12.3–15.4)
GLOBULIN UR ELPH-MCNC: 1.8 GM/DL
GLUCOSE SERPL-MCNC: 101 MG/DL (ref 65–99)
HCT VFR BLD AUTO: 46.6 % (ref 37.5–51)
HGB BLD-MCNC: 15.5 G/DL (ref 13–17.7)
INR PPP: 1.16 (ref 0.9–1.1)
MCH RBC QN AUTO: 28.3 PG (ref 26.6–33)
MCHC RBC AUTO-ENTMCNC: 33.3 G/DL (ref 31.5–35.7)
MCV RBC AUTO: 85 FL (ref 79–97)
PLATELET # BLD AUTO: 121 10*3/MM3 (ref 140–450)
PMV BLD AUTO: 12.5 FL (ref 6–12)
POTASSIUM SERPL-SCNC: 4.3 MMOL/L (ref 3.5–5.2)
PROT SERPL-MCNC: 6.3 G/DL (ref 6–8.5)
PROTHROMBIN TIME: 14.7 SECONDS (ref 11.7–14.2)
RBC # BLD AUTO: 5.48 10*6/MM3 (ref 4.14–5.8)
SODIUM SERPL-SCNC: 143 MMOL/L (ref 136–145)
WBC NRBC COR # BLD AUTO: 4.07 10*3/MM3 (ref 3.4–10.8)

## 2025-07-21 PROCEDURE — 85730 THROMBOPLASTIN TIME PARTIAL: CPT

## 2025-07-21 PROCEDURE — 85610 PROTHROMBIN TIME: CPT

## 2025-07-21 PROCEDURE — 85027 COMPLETE CBC AUTOMATED: CPT

## 2025-07-21 PROCEDURE — 80053 COMPREHEN METABOLIC PANEL: CPT

## 2025-07-21 RX ORDER — OMEPRAZOLE 40 MG/1
40 CAPSULE, DELAYED RELEASE ORAL DAILY
COMMUNITY

## 2025-07-25 ENCOUNTER — ANESTHESIA EVENT (OUTPATIENT)
Dept: GASTROENTEROLOGY | Facility: HOSPITAL | Age: 75
End: 2025-07-25
Payer: MEDICARE

## 2025-07-25 ENCOUNTER — HOSPITAL ENCOUNTER (OUTPATIENT)
Dept: CT IMAGING | Facility: HOSPITAL | Age: 75
Discharge: HOME | End: 2025-07-25
Payer: MEDICARE

## 2025-07-25 ENCOUNTER — APPOINTMENT (OUTPATIENT)
Dept: GENERAL RADIOLOGY | Facility: HOSPITAL | Age: 75
End: 2025-07-25
Payer: MEDICARE

## 2025-07-25 ENCOUNTER — HOSPITAL ENCOUNTER (OUTPATIENT)
Facility: HOSPITAL | Age: 75
Setting detail: HOSPITAL OUTPATIENT SURGERY
Discharge: HOME | End: 2025-07-25
Attending: INTERNAL MEDICINE | Admitting: INTERNAL MEDICINE
Payer: MEDICARE

## 2025-07-25 ENCOUNTER — ANESTHESIA (OUTPATIENT)
Dept: GASTROENTEROLOGY | Facility: HOSPITAL | Age: 75
End: 2025-07-25
Payer: MEDICARE

## 2025-07-25 VITALS
HEIGHT: 69 IN | SYSTOLIC BLOOD PRESSURE: 119 MMHG | TEMPERATURE: 98.4 F | DIASTOLIC BLOOD PRESSURE: 74 MMHG | WEIGHT: 202.82 LBS | HEART RATE: 64 BPM | BODY MASS INDEX: 30.04 KG/M2 | RESPIRATION RATE: 17 BRPM | OXYGEN SATURATION: 94 %

## 2025-07-25 DIAGNOSIS — R91.1 LUNG NODULE: ICD-10-CM

## 2025-07-25 DIAGNOSIS — R91.8 LUNG MASS: ICD-10-CM

## 2025-07-25 LAB
B PARAPERT DNA SPEC QL NAA+PROBE: NOT DETECTED
B PERT DNA SPEC QL NAA+PROBE: NOT DETECTED
BASOPHILS # BLD AUTO: 0.02 10*3/MM3 (ref 0–0.2)
BASOPHILS NFR BLD AUTO: 0.5 % (ref 0–1.5)
C PNEUM DNA NPH QL NAA+NON-PROBE: NOT DETECTED
DEPRECATED RDW RBC AUTO: 38.5 FL (ref 37–54)
EOSINOPHIL # BLD AUTO: 0.02 10*3/MM3 (ref 0–0.4)
EOSINOPHIL NFR BLD AUTO: 0.5 % (ref 0.3–6.2)
ERYTHROCYTE [DISTWIDTH] IN BLOOD BY AUTOMATED COUNT: 12.6 % (ref 12.3–15.4)
FLUAV SUBTYP SPEC NAA+PROBE: NOT DETECTED
FLUBV RNA NPH QL NAA+NON-PROBE: NOT DETECTED
HADV DNA SPEC NAA+PROBE: NOT DETECTED
HCOV 229E RNA SPEC QL NAA+PROBE: NOT DETECTED
HCOV HKU1 RNA SPEC QL NAA+PROBE: NOT DETECTED
HCOV NL63 RNA SPEC QL NAA+PROBE: NOT DETECTED
HCOV OC43 RNA SPEC QL NAA+PROBE: NOT DETECTED
HCT VFR BLD AUTO: 48.3 % (ref 37.5–51)
HGB BLD-MCNC: 15.9 G/DL (ref 13–17.7)
HMPV RNA NPH QL NAA+NON-PROBE: NOT DETECTED
HPIV1 RNA ISLT QL NAA+PROBE: NOT DETECTED
HPIV2 RNA SPEC QL NAA+PROBE: NOT DETECTED
HPIV3 RNA NPH QL NAA+PROBE: NOT DETECTED
HPIV4 P GENE NPH QL NAA+PROBE: NOT DETECTED
IMM GRANULOCYTES # BLD AUTO: 0.02 10*3/MM3 (ref 0–0.05)
IMM GRANULOCYTES NFR BLD AUTO: 0.5 % (ref 0–0.5)
INR PPP: 1.19 (ref 0.9–1.1)
LYMPHOCYTES # BLD AUTO: 0.81 10*3/MM3 (ref 0.7–3.1)
LYMPHOCYTES NFR BLD AUTO: 19 % (ref 19.6–45.3)
M PNEUMO IGG SER IA-ACNC: NOT DETECTED
MCH RBC QN AUTO: 27.7 PG (ref 26.6–33)
MCHC RBC AUTO-ENTMCNC: 32.9 G/DL (ref 31.5–35.7)
MCV RBC AUTO: 84.1 FL (ref 79–97)
MONOCYTES # BLD AUTO: 0.37 10*3/MM3 (ref 0.1–0.9)
MONOCYTES NFR BLD AUTO: 8.7 % (ref 5–12)
NEUTROPHILS NFR BLD AUTO: 3.02 10*3/MM3 (ref 1.7–7)
NEUTROPHILS NFR BLD AUTO: 70.8 % (ref 42.7–76)
NRBC BLD AUTO-RTO: 0 /100 WBC (ref 0–0.2)
PLATELET # BLD AUTO: 108 10*3/MM3 (ref 140–450)
PMV BLD AUTO: 11.3 FL (ref 6–12)
PROTHROMBIN TIME: 15 SECONDS (ref 11.7–14.2)
RBC # BLD AUTO: 5.74 10*6/MM3 (ref 4.14–5.8)
RHINOVIRUS RNA SPEC NAA+PROBE: NOT DETECTED
RSV RNA NPH QL NAA+NON-PROBE: NOT DETECTED
SARS-COV-2 RNA RESP QL NAA+PROBE: NOT DETECTED
WBC NRBC COR # BLD AUTO: 4.26 10*3/MM3 (ref 3.4–10.8)

## 2025-07-25 PROCEDURE — 88305 TISSUE EXAM BY PATHOLOGIST: CPT | Performed by: INTERNAL MEDICINE

## 2025-07-25 PROCEDURE — 25010000002 LIDOCAINE PF 2% 2 % SOLUTION: Performed by: NURSE ANESTHETIST, CERTIFIED REGISTERED

## 2025-07-25 PROCEDURE — 87798 DETECT AGENT NOS DNA AMP: CPT | Performed by: INTERNAL MEDICINE

## 2025-07-25 PROCEDURE — 71045 X-RAY EXAM CHEST 1 VIEW: CPT

## 2025-07-25 PROCEDURE — 87116 MYCOBACTERIA CULTURE: CPT | Performed by: INTERNAL MEDICINE

## 2025-07-25 PROCEDURE — 25010000002 SUGAMMADEX 200 MG/2ML SOLUTION: Performed by: NURSE ANESTHETIST, CERTIFIED REGISTERED

## 2025-07-25 PROCEDURE — 87070 CULTURE OTHR SPECIMN AEROBIC: CPT | Performed by: INTERNAL MEDICINE

## 2025-07-25 PROCEDURE — 87205 SMEAR GRAM STAIN: CPT | Performed by: INTERNAL MEDICINE

## 2025-07-25 PROCEDURE — 88108 CYTOPATH CONCENTRATE TECH: CPT | Performed by: INTERNAL MEDICINE

## 2025-07-25 PROCEDURE — 88172 CYTP DX EVAL FNA 1ST EA SITE: CPT | Performed by: INTERNAL MEDICINE

## 2025-07-25 PROCEDURE — 71250 CT THORAX DX C-: CPT

## 2025-07-25 PROCEDURE — 88177 CYTP FNA EVAL EA ADDL: CPT | Performed by: INTERNAL MEDICINE

## 2025-07-25 PROCEDURE — 87206 SMEAR FLUORESCENT/ACID STAI: CPT | Performed by: INTERNAL MEDICINE

## 2025-07-25 PROCEDURE — 0202U NFCT DS 22 TRGT SARS-COV-2: CPT | Performed by: INTERNAL MEDICINE

## 2025-07-25 PROCEDURE — 76000 FLUOROSCOPY <1 HR PHYS/QHP: CPT

## 2025-07-25 PROCEDURE — 25810000003 SODIUM CHLORIDE 0.9 % SOLUTION: Performed by: NURSE ANESTHETIST, CERTIFIED REGISTERED

## 2025-07-25 PROCEDURE — 85025 COMPLETE CBC W/AUTO DIFF WBC: CPT | Performed by: ANESTHESIOLOGY

## 2025-07-25 PROCEDURE — 87102 FUNGUS ISOLATION CULTURE: CPT | Performed by: INTERNAL MEDICINE

## 2025-07-25 PROCEDURE — 25810000003 SODIUM CHLORIDE 0.9 % SOLUTION: Performed by: ANESTHESIOLOGY

## 2025-07-25 PROCEDURE — 85610 PROTHROMBIN TIME: CPT | Performed by: ANESTHESIOLOGY

## 2025-07-25 PROCEDURE — 25010000002 PROPOFOL 10 MG/ML EMULSION: Performed by: NURSE ANESTHETIST, CERTIFIED REGISTERED

## 2025-07-25 RX ORDER — SODIUM CHLORIDE 9 MG/ML
30 INJECTION, SOLUTION INTRAVENOUS
Status: COMPLETED | OUTPATIENT
Start: 2025-07-25 | End: 2025-07-25

## 2025-07-25 RX ORDER — LIDOCAINE HYDROCHLORIDE 20 MG/ML
INJECTION, SOLUTION EPIDURAL; INFILTRATION; INTRACAUDAL; PERINEURAL AS NEEDED
Status: DISCONTINUED | OUTPATIENT
Start: 2025-07-25 | End: 2025-07-25 | Stop reason: SURG

## 2025-07-25 RX ORDER — PROPOFOL 10 MG/ML
VIAL (ML) INTRAVENOUS AS NEEDED
Status: DISCONTINUED | OUTPATIENT
Start: 2025-07-25 | End: 2025-07-25 | Stop reason: SURG

## 2025-07-25 RX ORDER — ROCURONIUM BROMIDE 10 MG/ML
INJECTION, SOLUTION INTRAVENOUS AS NEEDED
Status: DISCONTINUED | OUTPATIENT
Start: 2025-07-25 | End: 2025-07-25 | Stop reason: SURG

## 2025-07-25 RX ORDER — SODIUM CHLORIDE 9 MG/ML
INJECTION, SOLUTION INTRAVENOUS CONTINUOUS PRN
Status: DISCONTINUED | OUTPATIENT
Start: 2025-07-25 | End: 2025-07-25 | Stop reason: SURG

## 2025-07-25 RX ADMIN — ROCURONIUM BROMIDE 30 MG: 10 INJECTION, SOLUTION INTRAVENOUS at 10:56

## 2025-07-25 RX ADMIN — PROPOFOL 125 MCG/KG/MIN: 10 INJECTION, EMULSION INTRAVENOUS at 11:00

## 2025-07-25 RX ADMIN — SODIUM CHLORIDE: 9 INJECTION, SOLUTION INTRAVENOUS at 10:51

## 2025-07-25 RX ADMIN — SUGAMMADEX 200 MG: 100 INJECTION, SOLUTION INTRAVENOUS at 11:31

## 2025-07-25 RX ADMIN — LIDOCAINE HYDROCHLORIDE 50 MG: 20 INJECTION, SOLUTION EPIDURAL; INFILTRATION; INTRACAUDAL; PERINEURAL at 10:56

## 2025-07-25 RX ADMIN — PROPOFOL 150 MG: 10 INJECTION, EMULSION INTRAVENOUS at 10:56

## 2025-07-25 RX ADMIN — SODIUM CHLORIDE 30 ML/HR: 9 INJECTION, SOLUTION INTRAVENOUS at 10:44

## 2025-07-25 NOTE — DISCHARGE INSTRUCTIONS
Endoscopic ultrasound and Robotic bronchoscopy with fine needle aspiration    Samples (biopsies) of the lymph nodes are taken from inside the lungs and sent for diagnostic testing.    Final results of your biopsy take up to 5 business days to process; your endoscopic physician will contact you with your results.    EBUS and robotic bronchoscopy is recommended in the following instances:  To diagnose different types of lung disorders (such as sarcoidosis or tuberculosis)  To diagnose or 'stage' cancer   To investigate enlarged lymph  nodes in the chest    Due to effects of sedation, do not drive or operate heavy machinery for 24 hours.    Avoid heavy lifting (>10 lbs.) or strenuous activity for 48 hours.    Continue to avoid non-steroidal anti-inflammatory medications (NSAIDS) for 5-7 days after the procedure unless told otherwise by your endoscopic and primary care physicians.    Some patients have a temporary sore throat after the procedure; this is a normal finding and over-the-counter lozenges help soothe symptoms.    You may resume normal diet once you are discharged.     Avoid red-colored foods for 24 hours.     You may resume your blood thinner medications (if applicable) as directed by your endoscopic and primary care physicians.    It is normal to have a very small amount of blood-tinged sputum immediately after the procedure. This should resolve within 3-4 days.    Although complications are rare, there is a small risk of pain, collapsed lung, bleeding and infection. Contact your endoscopic physician if you have these signs or symptoms:  Temperature greater than 102°F  Worsening pain not relieved by medications  Go to your nearest emergency room is you experience:  Shaking, chills or a temperature over 102°F.    New, sudden difficulty breathing.    New pain when taking a deep breath.    New, sudden chest pain.  Worsening cough that produces large amounts of blood.     To reach your provider for  questions:Dr. Jamison TOBIAS 8am-5pm call [phone]  After hours or weekends call [phone]

## 2025-07-25 NOTE — ANESTHESIA POSTPROCEDURE EVALUATION
Patient: Evaristo Raineycheon    Procedure Summary       Date: 07/25/25 Room / Location: [facility] ENDOSCOPY 3 / [facility] ENDOSCOPY    Anesthesia Start: 1050 Anesthesia Stop: 1147    Procedure: BRONCHOSCOPY WITH ION ROBOT WITH FINE NEEDLE ASPIRATION X1 AREA AND CRYO BIOPSY X1 AREA (Bilateral: Bronchus) Diagnosis:       Lung nodule      (RUL LUNG NODULE)    Surgeons: Rut Swift MD Provider: Leticia Andres MD    Anesthesia Type: general ASA Status: 3            Anesthesia Type: general    Vitals  Vitals Value Taken Time   /72 07/25/25 12:47   Temp 98.4 °F (36.9 °C) 07/25/25 12:25   Pulse 64 07/25/25 12:48   Resp 17 07/25/25 12:40   SpO2 94 % 07/25/25 12:48   Vitals shown include unfiled device data.        Post Anesthesia Care and Evaluation    Patient location during evaluation: PACU  Patient participation: complete - patient participated  Level of consciousness: awake and alert  Pain management: satisfactory to patient    Airway patency: patent  Anesthetic complications: No anesthetic complications  PONV Status: none  Cardiovascular status: acceptable  Respiratory status: acceptable  Hydration status: acceptable

## 2025-07-25 NOTE — ANESTHESIA PREPROCEDURE EVALUATION
Anesthesia Evaluation     Patient summary reviewed   no history of anesthetic complications:   NPO Solid Status: > 8 hours  NPO Liquid Status: > 8 hours           Airway   Mallampati: II  TM distance: >3 FB  Neck ROM: full  No difficulty expected  Dental - normal exam     Pulmonary - normal exam   (+) a smoker Former,shortness of breath    ROS comment: Lung nodule  Cardiovascular - normal exam    ECG reviewed    (+) hypertension, CAD, hyperlipidemia      Neuro/Psych  GI/Hepatic/Renal/Endo      Musculoskeletal     Abdominal    Substance History      OB/GYN          Other   arthritis,                   Anesthesia Plan    ASA 3     general   total IV anesthesia  intravenous induction     Anesthetic plan, risks, benefits, and alternatives have been provided, discussed and informed consent has been obtained with: patient.    Plan discussed with CRNA.      CODE STATUS:

## 2025-07-25 NOTE — H&P
Patient Care Team:  Denzel Rashid MD as PCP - General (Family Medicine)  Talia Hickey MD as Consulting Physician (Cardiology)  Joann Aparicio APRN as Nurse Practitioner (Family Medicine)    Chief complaint abnormal PET scan        Assessment & Plan     74-year-old male quit smoking 1978,   ct chest at 1st urology, new solid 2 cm nodule ADRIANE, juxta-pleural   PET 7/10/25, ADRIANE  central abutting the pleura  max SUV 4.1   hypermetabolic uptake involving several central mesenteric lymph nodes and aortocaval lymph nodes with hazy/infiltrative appearance as well as splenomegaly with hypermetabolic uptake and innumerable osseous lesions with hypermetabolic uptake. Differential considerations would include lymphoma/lymphoproliferative process, sarcoidosis, or metastatic disease      Plan  Ion robotic bronchoscopy  Laboratory data reviewed INR 1.1, platelets are 121        History      As above                    * No active hospital problems. 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/07/2018    Biliary dyskinesia 06/17/2019    Coronary artery disease     Diverticulosis 08/07/2018    Gastric reflux 06/17/2019    Hemorrhoids 06/17/2019    Hyperlipidemia 06/17/2019    Hypertension 06/17/2019       Past Surgical History:   Procedure Laterality Date    CARDIAC CATHETERIZATION      CARDIAC CATHETERIZATION N/A 7/17/2020    Procedure: Left and Right Heart Cath with Coronary Angiography;  Surgeon: Talia Hickey MD;  Location: [facility] CATH INVASIVE LOCATION;  Service: Cardiovascular;  Laterality: N/A;    CORONARY ANGIOPLASTY WITH STENT PLACEMENT  06/21/2010    UMBILICAL HERNIA REPAIR         Family History   Problem Relation Age of Onset    Heart failure Mother     Heart failure Father     Heart disease Brother     Heart disease Brother     Heart attack Brother     No Known Problems Brother        Social History     Socioeconomic History    Marital status:      Number of children: 2   Tobacco Use    Smoking status: Former     Current packs/day: 0.00     Average packs/day: 1 pack/day for 16.0 years (16.0 ttl pk-yrs)     Types: Cigarettes, Pipe     Start date:      Quit date: 1978     Years since quittin.5     Passive exposure: Past    Smokeless tobacco: Never    Tobacco comments:     Smoked for 20yrs   Vaping Use    Vaping status: Never Used   Substance and Sexual Activity    Alcohol use: Never    Drug use: Never    Sexual activity: Yes     Partners: Female     Birth control/protection: Vasectomy       Review of Systems  Review of Systems   Respiratory:  Positive for cough. Negative for shortness of breath, wheezing and stridor.    Cardiovascular:  Negative for chest pain, palpitations and leg swelling.        Vital Signs       Physical Exam:  Physical Exam  Cardiovascular:      Heart sounds: Murmur heard.      No gallop.   Pulmonary:      Effort: No respiratory distress.      Breath sounds: No stridor. Rales present. No wheezing or rhonchi.   Chest:      Chest wall: No tenderness.           Radiology  Imaging Results (Last 24 Hours)       ** No results found for the last 24 hours. **            Labs:  Results from last 7 days   Lab Units 25  1416   WBC 10*3/mm3 4.07   HEMOGLOBIN g/dL 15.5   HEMATOCRIT % 46.6   PLATELETS 10*3/mm3 121*     Results from last 7 days   Lab Units 25  1416   SODIUM mmol/L 143   POTASSIUM mmol/L 4.3   CHLORIDE mmol/L 102   CO2 mmol/L 26.7   BUN mg/dL 14.6   CREATININE mg/dL 0.89   CALCIUM mg/dL 9.9   BILIRUBIN mg/dL 1.4*   ALK PHOS U/L 78   ALT (SGPT) U/L 25   AST (SGOT) U/L 30   GLUCOSE mg/dL 101*         Results from last 7 days   Lab Units 25  1416   ALBUMIN g/dL 4.5                 Results from last 7 days   Lab Units 25  1416   INR  1.16*   APTT seconds 30.4               Meds:   SCHEDULE    Infusions  No current facility-administered medications for this encounter.    PRNs        I discussed the  patient's findings and my recommendations with patient.     Rut Swift MD  07/25/25  08:47 EDT    Time: Critical care 60 min

## 2025-07-26 LAB
Lab: NORMAL
NIGHT BLUE STAIN TISS: NORMAL

## 2025-07-27 LAB
BACTERIA SPEC RESP CULT: NORMAL
GRAM STN SPEC: NORMAL

## 2025-07-28 LAB
BEAKER LAB AP INTRAOPERATIVE CONSULTATION: NORMAL
CYTO UR: NORMAL
LAB AP CASE REPORT: NORMAL
LAB AP DIAGNOSIS COMMENT: NORMAL
LAB AP FLOW CYTOMETRY SUMMARY: NORMAL
PATH REPORT.FINAL DX SPEC: NORMAL
PATH REPORT.GROSS SPEC: NORMAL

## 2025-07-29 LAB
LAB AP CASE REPORT: NORMAL
P JIROVECII DNA L RESP QL NAA+NON-PROBE: NEGATIVE
PATH REPORT.FINAL DX SPEC: NORMAL
PATH REPORT.GROSS SPEC: NORMAL
REF LAB TEST METHOD: NORMAL

## 2025-07-30 ENCOUNTER — PATIENT OUTREACH (OUTPATIENT)
Dept: ONCOLOGY | Facility: CLINIC | Age: 75
End: 2025-07-30
Payer: MEDICARE

## 2025-07-30 DIAGNOSIS — C82.90 FOLLICULAR LYMPHOMA, UNSPECIFIED FOLLICULAR LYMPHOMA TYPE, UNSPECIFIED BODY REGION: Primary | ICD-10-CM

## 2025-07-30 NOTE — SIGNIFICANT NOTE
Message from Dr. Swift about scheduling patient with oncology. Patient scheduled for 8/4 at 3 pm with Dr. Rueda. Patient called and aware of appt and location. No other questions at this time.     Radha Hardy  Lung Nurse Navigator   [facility]  [phone]  [email or web address]

## 2025-08-01 LAB
FUNGUS WND CULT: NORMAL
MYCOBACTERIUM SPEC CULT: NORMAL
MYD88 MUTATION DETECTION BY PCR ANALYSIS RESULT: NORMAL
NIGHT BLUE STAIN TISS: NORMAL

## 2025-08-02 LAB — TARGETGENE RESULT: NORMAL

## 2025-08-04 ENCOUNTER — LAB (OUTPATIENT)
Dept: LAB | Facility: HOSPITAL | Age: 75
End: 2025-08-04
Payer: MEDICARE

## 2025-08-04 ENCOUNTER — CONSULT (OUTPATIENT)
Dept: ONCOLOGY | Facility: CLINIC | Age: 75
End: 2025-08-04
Payer: MEDICARE

## 2025-08-04 VITALS
WEIGHT: 196 LBS | SYSTOLIC BLOOD PRESSURE: 145 MMHG | HEART RATE: 73 BPM | OXYGEN SATURATION: 96 % | BODY MASS INDEX: 29.03 KG/M2 | DIASTOLIC BLOOD PRESSURE: 75 MMHG | HEIGHT: 69 IN

## 2025-08-04 DIAGNOSIS — N40.0 BENIGN PROSTATIC HYPERPLASIA, UNSPECIFIED WHETHER LOWER URINARY TRACT SYMPTOMS PRESENT: ICD-10-CM

## 2025-08-04 DIAGNOSIS — K75.9 HEPATITIS: ICD-10-CM

## 2025-08-04 DIAGNOSIS — C82.98 FOLLICULAR LYMPHOMA OF LYMPH NODES OF MULTIPLE REGIONS, UNSPECIFIED FOLLICULAR LYMPHOMA TYPE: ICD-10-CM

## 2025-08-04 DIAGNOSIS — C82.98 FOLLICULAR LYMPHOMA OF LYMPH NODES OF MULTIPLE REGIONS, UNSPECIFIED FOLLICULAR LYMPHOMA TYPE: Primary | ICD-10-CM

## 2025-08-04 LAB
ALBUMIN SERPL-MCNC: 5 G/DL (ref 3.5–5.2)
ALBUMIN/GLOB SERPL: 2.6 G/DL
ALP SERPL-CCNC: 80 U/L (ref 39–117)
ALT SERPL W P-5'-P-CCNC: 33 U/L (ref 1–41)
ANION GAP SERPL CALCULATED.3IONS-SCNC: 14.6 MMOL/L (ref 5–15)
AST SERPL-CCNC: 32 U/L (ref 1–40)
B2 MICROGLOB SERPL-MCNC: 2.8 MG/L (ref 0.8–2.2)
BASOPHILS # BLD AUTO: 0.02 10*3/MM3 (ref 0–0.2)
BASOPHILS NFR BLD AUTO: 0.4 % (ref 0–1.5)
BILIRUB SERPL-MCNC: 2.6 MG/DL (ref 0–1.2)
BUN SERPL-MCNC: 13 MG/DL (ref 8–23)
BUN/CREAT SERPL: 15.7 (ref 7–25)
CALCIUM SPEC-SCNC: 10.1 MG/DL (ref 8.6–10.5)
CHLORIDE SERPL-SCNC: 100 MMOL/L (ref 98–107)
CO2 SERPL-SCNC: 25.4 MMOL/L (ref 22–29)
CREAT SERPL-MCNC: 0.83 MG/DL (ref 0.76–1.27)
DEPRECATED RDW RBC AUTO: 38.8 FL (ref 37–54)
EGFRCR SERPLBLD CKD-EPI 2021: 90.7 ML/MIN/1.73
EOSINOPHIL # BLD AUTO: 0.03 10*3/MM3 (ref 0–0.4)
EOSINOPHIL NFR BLD AUTO: 0.6 % (ref 0.3–6.2)
ERYTHROCYTE [DISTWIDTH] IN BLOOD BY AUTOMATED COUNT: 12.6 % (ref 12.3–15.4)
ERYTHROCYTE [SEDIMENTATION RATE] IN BLOOD: <1 MM/HR (ref 0–20)
FERRITIN SERPL-MCNC: 52.4 NG/ML (ref 30–400)
FOLATE SERPL-MCNC: 11.5 NG/ML (ref 4.78–24.2)
GLOBULIN UR ELPH-MCNC: 1.9 GM/DL
GLUCOSE SERPL-MCNC: 101 MG/DL (ref 65–99)
HAV IGM SERPL QL IA: NORMAL
HBV CORE IGM SERPL QL IA: NORMAL
HBV SURFACE AG SERPL QL IA: NORMAL
HCT VFR BLD AUTO: 49.9 % (ref 37.5–51)
HCV AB SER QL: NORMAL
HGB BLD-MCNC: 17 G/DL (ref 13–17.7)
IMM GRANULOCYTES # BLD AUTO: 0.02 10*3/MM3 (ref 0–0.05)
IMM GRANULOCYTES NFR BLD AUTO: 0.4 % (ref 0–0.5)
IRON 24H UR-MRATE: 70 MCG/DL (ref 59–158)
IRON SATN MFR SERPL: 14 % (ref 20–50)
LDH SERPL-CCNC: 148 U/L (ref 135–225)
LYMPHOCYTES # BLD AUTO: 0.92 10*3/MM3 (ref 0.7–3.1)
LYMPHOCYTES NFR BLD AUTO: 17.7 % (ref 19.6–45.3)
MCH RBC QN AUTO: 28.2 PG (ref 26.6–33)
MCHC RBC AUTO-ENTMCNC: 34.1 G/DL (ref 31.5–35.7)
MCV RBC AUTO: 82.8 FL (ref 79–97)
MONOCYTES # BLD AUTO: 0.44 10*3/MM3 (ref 0.1–0.9)
MONOCYTES NFR BLD AUTO: 8.5 % (ref 5–12)
NEUTROPHILS NFR BLD AUTO: 3.76 10*3/MM3 (ref 1.7–7)
NEUTROPHILS NFR BLD AUTO: 72.4 % (ref 42.7–76)
PLATELET # BLD AUTO: 106 10*3/MM3 (ref 140–450)
PMV BLD AUTO: 11.6 FL (ref 6–12)
POTASSIUM SERPL-SCNC: 4.2 MMOL/L (ref 3.5–5.2)
PROT SERPL-MCNC: 6.9 G/DL (ref 6–8.5)
PSA SERPL-MCNC: 0.91 NG/ML (ref 0–4)
RBC # BLD AUTO: 6.03 10*6/MM3 (ref 4.14–5.8)
SODIUM SERPL-SCNC: 140 MMOL/L (ref 136–145)
TIBC SERPL-MCNC: 495 MCG/DL (ref 298–536)
TRANSFERRIN SERPL-MCNC: 332 MG/DL (ref 200–360)
URATE SERPL-MCNC: 5.6 MG/DL (ref 3.4–7)
VIT B12 BLD-MCNC: 337 PG/ML (ref 211–946)
WBC NRBC COR # BLD AUTO: 5.19 10*3/MM3 (ref 3.4–10.8)

## 2025-08-04 PROCEDURE — 80053 COMPREHEN METABOLIC PANEL: CPT | Performed by: STUDENT IN AN ORGANIZED HEALTH CARE EDUCATION/TRAINING PROGRAM

## 2025-08-04 PROCEDURE — 83615 LACTATE (LD) (LDH) ENZYME: CPT | Performed by: STUDENT IN AN ORGANIZED HEALTH CARE EDUCATION/TRAINING PROGRAM

## 2025-08-04 PROCEDURE — 82607 VITAMIN B-12: CPT | Performed by: STUDENT IN AN ORGANIZED HEALTH CARE EDUCATION/TRAINING PROGRAM

## 2025-08-04 PROCEDURE — 88184 FLOWCYTOMETRY/ TC 1 MARKER: CPT

## 2025-08-04 PROCEDURE — 84153 ASSAY OF PSA TOTAL: CPT | Performed by: STUDENT IN AN ORGANIZED HEALTH CARE EDUCATION/TRAINING PROGRAM

## 2025-08-04 PROCEDURE — 83540 ASSAY OF IRON: CPT | Performed by: STUDENT IN AN ORGANIZED HEALTH CARE EDUCATION/TRAINING PROGRAM

## 2025-08-04 PROCEDURE — 85025 COMPLETE CBC W/AUTO DIFF WBC: CPT

## 2025-08-04 PROCEDURE — 88185 FLOWCYTOMETRY/TC ADD-ON: CPT

## 2025-08-04 PROCEDURE — 84466 ASSAY OF TRANSFERRIN: CPT | Performed by: STUDENT IN AN ORGANIZED HEALTH CARE EDUCATION/TRAINING PROGRAM

## 2025-08-04 PROCEDURE — 88182 CELL MARKER STUDY: CPT

## 2025-08-04 PROCEDURE — 82746 ASSAY OF FOLIC ACID SERUM: CPT | Performed by: STUDENT IN AN ORGANIZED HEALTH CARE EDUCATION/TRAINING PROGRAM

## 2025-08-04 PROCEDURE — 82728 ASSAY OF FERRITIN: CPT | Performed by: STUDENT IN AN ORGANIZED HEALTH CARE EDUCATION/TRAINING PROGRAM

## 2025-08-04 PROCEDURE — 84550 ASSAY OF BLOOD/URIC ACID: CPT | Performed by: STUDENT IN AN ORGANIZED HEALTH CARE EDUCATION/TRAINING PROGRAM

## 2025-08-04 PROCEDURE — 85652 RBC SED RATE AUTOMATED: CPT | Performed by: STUDENT IN AN ORGANIZED HEALTH CARE EDUCATION/TRAINING PROGRAM

## 2025-08-04 PROCEDURE — 82232 ASSAY OF BETA-2 PROTEIN: CPT | Performed by: STUDENT IN AN ORGANIZED HEALTH CARE EDUCATION/TRAINING PROGRAM

## 2025-08-04 PROCEDURE — 36415 COLL VENOUS BLD VENIPUNCTURE: CPT

## 2025-08-04 PROCEDURE — 80074 ACUTE HEPATITIS PANEL: CPT | Performed by: STUDENT IN AN ORGANIZED HEALTH CARE EDUCATION/TRAINING PROGRAM

## 2025-08-05 ENCOUNTER — PATIENT ROUNDING (BHMG ONLY) (OUTPATIENT)
Dept: ONCOLOGY | Facility: CLINIC | Age: 75
End: 2025-08-05
Payer: MEDICARE

## 2025-08-05 LAB
BEAKER LAB AP INTRAOPERATIVE CONSULTATION: NORMAL
CCV RESULT: NORMAL
CYTO UR: NORMAL
LAB AP CASE REPORT: NORMAL
LAB AP DIAGNOSIS COMMENT: NORMAL
LAB AP FLOW CYTOMETRY SUMMARY: NORMAL
PATH REPORT.ADDENDUM SPEC: NORMAL
PATH REPORT.FINAL DX SPEC: NORMAL
PATH REPORT.GROSS SPEC: NORMAL

## 2025-08-07 LAB
ALBUMIN SERPL ELPH-MCNC: 4.3 G/DL (ref 2.9–4.4)
ALBUMIN/GLOB SERPL: 1.8 {RATIO} (ref 0.7–1.7)
ALPHA1 GLOB SERPL ELPH-MCNC: 0.2 G/DL (ref 0–0.4)
ALPHA2 GLOB SERPL ELPH-MCNC: 0.7 G/DL (ref 0.4–1)
B-GLOBULIN SERPL ELPH-MCNC: 0.9 G/DL (ref 0.7–1.3)
GAMMA GLOB SERPL ELPH-MCNC: 0.7 G/DL (ref 0.4–1.8)
GLOBULIN SER-MCNC: 2.5 G/DL (ref 2.2–3.9)
IGA SERPL-MCNC: 70 MG/DL (ref 61–437)
IGG SERPL-MCNC: 717 MG/DL (ref 603–1613)
IGM SERPL-MCNC: 33 MG/DL (ref 15–143)
INTERPRETATION SERPL IEP-IMP: ABNORMAL
KAPPA LC FREE SER-MCNC: 7 MG/L (ref 3.3–19.4)
KAPPA LC FREE/LAMBDA FREE SER: 0.79 {RATIO} (ref 0.26–1.65)
LABORATORY COMMENT REPORT: ABNORMAL
LAMBDA LC FREE SERPL-MCNC: 8.9 MG/L (ref 5.7–26.3)
M PROTEIN SERPL ELPH-MCNC: 0.2 G/DL
PROT SERPL-MCNC: 6.8 G/DL (ref 6–8.5)

## 2025-08-08 LAB
FUNGUS WND CULT: NORMAL
MYCOBACTERIUM SPEC CULT: NORMAL
NIGHT BLUE STAIN TISS: NORMAL

## 2025-08-10 NOTE — PROCEDURES
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  Patient was given the opportunity to ask questions and all concerns were answered.  The bronchocope was inserted into the main airway via the endotracheal tube. An anatomical survey was done of the main airways and the subsegmental bronchus to at least the first subsegmental level of all five lobes of both lungs.  The findings are reported below.  A bronchoalveolar lavage was performed using aliquots of normal saline instilled into the airways then aspirated back.      Findings:      Started with routine bronchoscopy which was introduced through the endotracheal tube all airways were visualized to at least the first subsegment level of all 5 lobes of both lungs.  Airways were of normal size and caliber.  No endobronchial lesions seen.    Mucoid secretions noted    We retracted the regular bronchoscope    Ion robotic catheter was introduced and completed mapping    Ion catheter was directed to the lesion upper lobe which was interestingly outside the lung parenchyma boundary    Location confirmation with radial probe signal and fluoroscopy    Utilizing 23-gauge needle.  FNA x 5 was done under direct fluoroscopy    No evidence of pneumothorax on fluoroscopy    After confirming no significant bleeding    We changed to regular bronchoscope and performed left upper lobe washing    Patient tolerated procedure well        Estimated Blood Loss:  Minimal            Specimens:  Sent serosanguinous fluid                Complications:  None; patient tolerated the procedure well.           Disposition: PACU - hemodynamically stable.      Patient tolerated the procedure well.    Rut Swift MD  8/10/2025  12:36 EDT

## 2025-08-12 ENCOUNTER — HOSPITAL ENCOUNTER (OUTPATIENT)
Dept: INTERVENTIONAL RADIOLOGY/VASCULAR | Facility: HOSPITAL | Age: 75
Discharge: HOME | End: 2025-08-12
Admitting: RADIOLOGY
Payer: MEDICARE

## 2025-08-12 VITALS
SYSTOLIC BLOOD PRESSURE: 121 MMHG | RESPIRATION RATE: 13 BRPM | HEIGHT: 69 IN | BODY MASS INDEX: 29.03 KG/M2 | WEIGHT: 196 LBS | TEMPERATURE: 97.5 F | OXYGEN SATURATION: 96 % | DIASTOLIC BLOOD PRESSURE: 69 MMHG | HEART RATE: 55 BPM

## 2025-08-12 DIAGNOSIS — C82.98 FOLLICULAR LYMPHOMA OF LYMPH NODES OF MULTIPLE REGIONS, UNSPECIFIED FOLLICULAR LYMPHOMA TYPE: ICD-10-CM

## 2025-08-12 LAB
APTT PPP: 31.5 SECONDS (ref 22.7–35.4)
BASOPHILS # BLD AUTO: 0.01 10*3/MM3 (ref 0–0.2)
BASOPHILS NFR BLD AUTO: 0.2 % (ref 0–1.5)
DEPRECATED RDW RBC AUTO: 39.8 FL (ref 37–54)
EOSINOPHIL # BLD AUTO: 0.03 10*3/MM3 (ref 0–0.4)
EOSINOPHIL NFR BLD AUTO: 0.7 % (ref 0.3–6.2)
ERYTHROCYTE [DISTWIDTH] IN BLOOD BY AUTOMATED COUNT: 12.8 % (ref 12.3–15.4)
HCT VFR BLD AUTO: 49.8 % (ref 37.5–51)
HGB BLD-MCNC: 16.5 G/DL (ref 13–17.7)
IMM GRANULOCYTES # BLD AUTO: 0.01 10*3/MM3 (ref 0–0.05)
IMM GRANULOCYTES NFR BLD AUTO: 0.2 % (ref 0–0.5)
INR PPP: 1.15 (ref 0.9–1.1)
LYMPHOCYTES # BLD AUTO: 0.81 10*3/MM3 (ref 0.7–3.1)
LYMPHOCYTES NFR BLD AUTO: 18.6 % (ref 19.6–45.3)
MCH RBC QN AUTO: 28 PG (ref 26.6–33)
MCHC RBC AUTO-ENTMCNC: 33.1 G/DL (ref 31.5–35.7)
MCV RBC AUTO: 84.4 FL (ref 79–97)
MONOCYTES # BLD AUTO: 0.41 10*3/MM3 (ref 0.1–0.9)
MONOCYTES NFR BLD AUTO: 9.4 % (ref 5–12)
NEUTROPHILS NFR BLD AUTO: 3.09 10*3/MM3 (ref 1.7–7)
NEUTROPHILS NFR BLD AUTO: 70.9 % (ref 42.7–76)
NRBC BLD AUTO-RTO: 0 /100 WBC (ref 0–0.2)
PLATELET # BLD AUTO: 100 10*3/MM3 (ref 140–450)
PMV BLD AUTO: 12.1 FL (ref 6–12)
PROTHROMBIN TIME: 14.7 SECONDS (ref 11.7–14.2)
RBC # BLD AUTO: 5.9 10*6/MM3 (ref 4.14–5.8)
REF LAB TEST METHOD: NORMAL
WBC NRBC COR # BLD AUTO: 4.36 10*3/MM3 (ref 3.4–10.8)

## 2025-08-12 PROCEDURE — 99152 MOD SED SAME PHYS/QHP 5/>YRS: CPT

## 2025-08-12 PROCEDURE — 85610 PROTHROMBIN TIME: CPT | Performed by: RADIOLOGY

## 2025-08-12 PROCEDURE — 25810000003 SODIUM CHLORIDE 0.9 % SOLUTION: Performed by: RADIOLOGY

## 2025-08-12 PROCEDURE — 25010000002 FENTANYL CITRATE (PF) 50 MCG/ML SOLUTION: Performed by: RADIOLOGY

## 2025-08-12 PROCEDURE — 85025 COMPLETE CBC W/AUTO DIFF WBC: CPT | Performed by: RADIOLOGY

## 2025-08-12 PROCEDURE — C1830 POWER BONE MARROW BX NEEDLE: HCPCS

## 2025-08-12 PROCEDURE — 25010000002 ONDANSETRON PER 1 MG: Performed by: RADIOLOGY

## 2025-08-12 PROCEDURE — 85730 THROMBOPLASTIN TIME PARTIAL: CPT | Performed by: RADIOLOGY

## 2025-08-12 PROCEDURE — 25010000002 LIDOCAINE 1 % SOLUTION: Performed by: RADIOLOGY

## 2025-08-12 PROCEDURE — 25010000002 MIDAZOLAM PER 1 MG: Performed by: RADIOLOGY

## 2025-08-12 PROCEDURE — 77002 NEEDLE LOCALIZATION BY XRAY: CPT

## 2025-08-12 RX ORDER — SODIUM CHLORIDE 9 MG/ML
75 INJECTION, SOLUTION INTRAVENOUS CONTINUOUS
Status: DISPENSED | OUTPATIENT
Start: 2025-08-12 | End: 2025-08-12

## 2025-08-12 RX ORDER — SODIUM CHLORIDE 0.9 % (FLUSH) 0.9 %
10 SYRINGE (ML) INJECTION EVERY 12 HOURS SCHEDULED
Status: DISCONTINUED | OUTPATIENT
Start: 2025-08-12 | End: 2025-08-13 | Stop reason: HOSPADM

## 2025-08-12 RX ORDER — LIDOCAINE HYDROCHLORIDE 10 MG/ML
INJECTION, SOLUTION INFILTRATION; PERINEURAL AS NEEDED
Status: COMPLETED | OUTPATIENT
Start: 2025-08-12 | End: 2025-08-12

## 2025-08-12 RX ORDER — SODIUM CHLORIDE 0.9 % (FLUSH) 0.9 %
10 SYRINGE (ML) INJECTION AS NEEDED
Status: DISCONTINUED | OUTPATIENT
Start: 2025-08-12 | End: 2025-08-13 | Stop reason: HOSPADM

## 2025-08-12 RX ORDER — MIDAZOLAM HYDROCHLORIDE 1 MG/ML
INJECTION, SOLUTION INTRAMUSCULAR; INTRAVENOUS AS NEEDED
Status: COMPLETED | OUTPATIENT
Start: 2025-08-12 | End: 2025-08-12

## 2025-08-12 RX ORDER — FENTANYL CITRATE 50 UG/ML
INJECTION, SOLUTION INTRAMUSCULAR; INTRAVENOUS AS NEEDED
Status: COMPLETED | OUTPATIENT
Start: 2025-08-12 | End: 2025-08-12

## 2025-08-12 RX ORDER — ONDANSETRON 2 MG/ML
INJECTION INTRAMUSCULAR; INTRAVENOUS AS NEEDED
Status: COMPLETED | OUTPATIENT
Start: 2025-08-12 | End: 2025-08-12

## 2025-08-12 RX ADMIN — MIDAZOLAM 1 MG: 1 INJECTION INTRAMUSCULAR; INTRAVENOUS at 09:30

## 2025-08-12 RX ADMIN — LIDOCAINE HYDROCHLORIDE 9 ML: 10 INJECTION, SOLUTION INFILTRATION; PERINEURAL at 09:37

## 2025-08-12 RX ADMIN — ONDANSETRON 4 MG: 2 INJECTION INTRAMUSCULAR; INTRAVENOUS at 09:25

## 2025-08-12 RX ADMIN — SODIUM CHLORIDE 75 ML/HR: 9 INJECTION, SOLUTION INTRAVENOUS at 08:29

## 2025-08-12 RX ADMIN — Medication 10 ML: at 08:29

## 2025-08-12 RX ADMIN — FENTANYL CITRATE 100 MCG: 50 INJECTION, SOLUTION INTRAMUSCULAR; INTRAVENOUS at 09:30

## 2025-08-13 LAB — Lab: NORMAL

## 2025-08-15 LAB
FUNGUS WND CULT: NORMAL
MYCOBACTERIUM SPEC CULT: NORMAL
NIGHT BLUE STAIN TISS: NORMAL

## 2025-08-19 LAB — BLOOD OR BONE MARROW RESULT: NORMAL

## 2025-08-20 LAB
CCV RESULT: NORMAL
CYTOGENETICS RESULT: NORMAL

## 2025-08-22 LAB
FUNGUS WND CULT: NORMAL
LAB AP CASE REPORT: NORMAL
LAB AP CYTOGENETICS REPORT,ADDENDUM: NORMAL
LAB AP DIAGNOSIS COMMENT: NORMAL
MYCOBACTERIUM SPEC CULT: NORMAL
NIGHT BLUE STAIN TISS: NORMAL
PATH REPORT.FINAL DX SPEC: NORMAL
PATH REPORT.GROSS SPEC: NORMAL

## 2025-08-25 ENCOUNTER — OFFICE VISIT (OUTPATIENT)
Dept: ONCOLOGY | Facility: CLINIC | Age: 75
End: 2025-08-25
Payer: MEDICARE

## 2025-08-25 VITALS
SYSTOLIC BLOOD PRESSURE: 139 MMHG | WEIGHT: 194.8 LBS | OXYGEN SATURATION: 95 % | DIASTOLIC BLOOD PRESSURE: 76 MMHG | BODY MASS INDEX: 28.85 KG/M2 | HEART RATE: 69 BPM | HEIGHT: 69 IN

## 2025-08-25 DIAGNOSIS — N40.0 BENIGN PROSTATIC HYPERPLASIA, UNSPECIFIED WHETHER LOWER URINARY TRACT SYMPTOMS PRESENT: ICD-10-CM

## 2025-08-25 DIAGNOSIS — C82.98 FOLLICULAR LYMPHOMA OF LYMPH NODES OF MULTIPLE REGIONS, UNSPECIFIED FOLLICULAR LYMPHOMA TYPE: Primary | ICD-10-CM

## 2025-08-25 DIAGNOSIS — D47.2 MGUS (MONOCLONAL GAMMOPATHY OF UNKNOWN SIGNIFICANCE): ICD-10-CM

## 2025-08-25 PROCEDURE — 1126F AMNT PAIN NOTED NONE PRSNT: CPT | Performed by: STUDENT IN AN ORGANIZED HEALTH CARE EDUCATION/TRAINING PROGRAM

## 2025-08-25 PROCEDURE — 3078F DIAST BP <80 MM HG: CPT | Performed by: STUDENT IN AN ORGANIZED HEALTH CARE EDUCATION/TRAINING PROGRAM

## 2025-08-25 PROCEDURE — 3075F SYST BP GE 130 - 139MM HG: CPT | Performed by: STUDENT IN AN ORGANIZED HEALTH CARE EDUCATION/TRAINING PROGRAM

## 2025-08-25 PROCEDURE — 99214 OFFICE O/P EST MOD 30 MIN: CPT | Performed by: STUDENT IN AN ORGANIZED HEALTH CARE EDUCATION/TRAINING PROGRAM

## 2025-08-25 RX ORDER — FAMOTIDINE 10 MG/ML
20 INJECTION, SOLUTION INTRAVENOUS AS NEEDED
OUTPATIENT
Start: 2025-09-15

## 2025-08-25 RX ORDER — SODIUM CHLORIDE 9 MG/ML
20 INJECTION, SOLUTION INTRAVENOUS ONCE
OUTPATIENT
Start: 2025-09-15

## 2025-08-25 RX ORDER — DIPHENHYDRAMINE HYDROCHLORIDE 50 MG/ML
50 INJECTION, SOLUTION INTRAMUSCULAR; INTRAVENOUS AS NEEDED
OUTPATIENT
Start: 2025-09-22

## 2025-08-25 RX ORDER — MEPERIDINE HYDROCHLORIDE 25 MG/ML
25 INJECTION INTRAMUSCULAR; INTRAVENOUS; SUBCUTANEOUS
OUTPATIENT
Start: 2025-09-22

## 2025-08-25 RX ORDER — HYDROCORTISONE SODIUM SUCCINATE 100 MG/2ML
100 INJECTION INTRAMUSCULAR; INTRAVENOUS AS NEEDED
OUTPATIENT
Start: 2025-09-01

## 2025-08-25 RX ORDER — HYDROCORTISONE SODIUM SUCCINATE 100 MG/2ML
100 INJECTION INTRAMUSCULAR; INTRAVENOUS AS NEEDED
OUTPATIENT
Start: 2025-09-22

## 2025-08-25 RX ORDER — FAMOTIDINE 10 MG/ML
20 INJECTION, SOLUTION INTRAVENOUS AS NEEDED
OUTPATIENT
Start: 2025-09-08

## 2025-08-25 RX ORDER — MEPERIDINE HYDROCHLORIDE 25 MG/ML
25 INJECTION INTRAMUSCULAR; INTRAVENOUS; SUBCUTANEOUS
OUTPATIENT
Start: 2025-09-15

## 2025-08-25 RX ORDER — HYDROCORTISONE SODIUM SUCCINATE 100 MG/2ML
100 INJECTION INTRAMUSCULAR; INTRAVENOUS AS NEEDED
OUTPATIENT
Start: 2025-09-15

## 2025-08-25 RX ORDER — SODIUM CHLORIDE 9 MG/ML
20 INJECTION, SOLUTION INTRAVENOUS ONCE
OUTPATIENT
Start: 2025-09-08

## 2025-08-25 RX ORDER — HYDROCORTISONE SODIUM SUCCINATE 100 MG/2ML
100 INJECTION INTRAMUSCULAR; INTRAVENOUS AS NEEDED
OUTPATIENT
Start: 2025-09-08

## 2025-08-25 RX ORDER — FAMOTIDINE 10 MG/ML
20 INJECTION, SOLUTION INTRAVENOUS AS NEEDED
OUTPATIENT
Start: 2025-09-22

## 2025-08-25 RX ORDER — ACETAMINOPHEN 325 MG/1
650 TABLET ORAL ONCE
OUTPATIENT
Start: 2025-09-08

## 2025-08-25 RX ORDER — ACETAMINOPHEN 325 MG/1
650 TABLET ORAL ONCE
OUTPATIENT
Start: 2025-09-01

## 2025-08-25 RX ORDER — MEPERIDINE HYDROCHLORIDE 25 MG/ML
25 INJECTION INTRAMUSCULAR; INTRAVENOUS; SUBCUTANEOUS
OUTPATIENT
Start: 2025-09-01

## 2025-08-25 RX ORDER — SODIUM CHLORIDE 9 MG/ML
20 INJECTION, SOLUTION INTRAVENOUS ONCE
OUTPATIENT
Start: 2025-09-22

## 2025-08-25 RX ORDER — FAMOTIDINE 10 MG/ML
20 INJECTION, SOLUTION INTRAVENOUS AS NEEDED
OUTPATIENT
Start: 2025-09-01

## 2025-08-25 RX ORDER — ACETAMINOPHEN 325 MG/1
650 TABLET ORAL ONCE
OUTPATIENT
Start: 2025-09-22

## 2025-08-25 RX ORDER — ACETAMINOPHEN 325 MG/1
650 TABLET ORAL ONCE
OUTPATIENT
Start: 2025-09-15

## 2025-08-25 RX ORDER — DIPHENHYDRAMINE HYDROCHLORIDE 50 MG/ML
50 INJECTION, SOLUTION INTRAMUSCULAR; INTRAVENOUS AS NEEDED
OUTPATIENT
Start: 2025-09-15

## 2025-08-25 RX ORDER — SODIUM CHLORIDE 9 MG/ML
20 INJECTION, SOLUTION INTRAVENOUS ONCE
OUTPATIENT
Start: 2025-09-01

## 2025-08-25 RX ORDER — MEPERIDINE HYDROCHLORIDE 25 MG/ML
25 INJECTION INTRAMUSCULAR; INTRAVENOUS; SUBCUTANEOUS
OUTPATIENT
Start: 2025-09-08

## 2025-08-25 RX ORDER — DIPHENHYDRAMINE HYDROCHLORIDE 50 MG/ML
50 INJECTION, SOLUTION INTRAMUSCULAR; INTRAVENOUS AS NEEDED
OUTPATIENT
Start: 2025-09-01

## 2025-08-25 RX ORDER — DIPHENHYDRAMINE HYDROCHLORIDE 50 MG/ML
50 INJECTION, SOLUTION INTRAMUSCULAR; INTRAVENOUS AS NEEDED
OUTPATIENT
Start: 2025-09-08

## 2025-08-26 ENCOUNTER — PATIENT OUTREACH (OUTPATIENT)
Dept: ONCOLOGY | Facility: CLINIC | Age: 75
End: 2025-08-26
Payer: MEDICARE

## 2025-08-26 ENCOUNTER — SPECIALTY PHARMACY (OUTPATIENT)
Dept: PHARMACY | Facility: HOSPITAL | Age: 75
End: 2025-08-26
Payer: MEDICARE

## 2025-08-27 ENCOUNTER — SPECIALTY PHARMACY (OUTPATIENT)
Dept: PHARMACY | Facility: HOSPITAL | Age: 75
End: 2025-08-27
Payer: MEDICARE

## 2025-08-28 ENCOUNTER — TELEPHONE (OUTPATIENT)
Dept: ONCOLOGY | Facility: CLINIC | Age: 75
End: 2025-08-28
Payer: MEDICARE

## 2025-08-29 LAB
MYCOBACTERIUM SPEC CULT: NORMAL
NIGHT BLUE STAIN TISS: NORMAL

## (undated) DEVICE — PINNACLE INTRODUCER SHEATH: Brand: PINNACLE

## (undated) DEVICE — PK TRY HEART CATH 50

## (undated) DEVICE — Device: Brand: ERBE

## (undated) DEVICE — BAPTIST FLOYD BRONCHOSCOPY: Brand: MEDLINE INDUSTRIES, INC.

## (undated) DEVICE — CATH DIAG IMPULSE FL4 5F 100CM

## (undated) DEVICE — CATHETER GUIDE

## (undated) DEVICE — SWIVEL CONNECTOR

## (undated) DEVICE — VISION PROBE: Brand: ION

## (undated) DEVICE — GW DIAG EMERALD HEPCOAT MOVE JTIP STD .035 3MM 150CM

## (undated) DEVICE — Device: Brand: ION

## (undated) DEVICE — SKIN PREP TRAY W/CHG: Brand: MEDLINE INDUSTRIES, INC.

## (undated) DEVICE — RADIFOCUS OBTURATOR: Brand: RADIFOCUS

## (undated) DEVICE — VISION PROBE ADAPTER AND SUCTION ADAPTER

## (undated) DEVICE — CATH DIAG IMPULSE PIG 5F 100CM

## (undated) DEVICE — BIOPSY NEEDLE, 23G: Brand: FLEXISION

## (undated) DEVICE — CATH DIAG IMPULSE FR4 5F 100CM

## (undated) DEVICE — SWAN-GANZ TRUE SIZE THERMODILUTION "S" TIP: Brand: SWAN-GANZ TRUE SIZE

## (undated) DEVICE — CATHETER: Brand: ION